# Patient Record
Sex: FEMALE | Race: BLACK OR AFRICAN AMERICAN | NOT HISPANIC OR LATINO | Employment: FULL TIME | URBAN - METROPOLITAN AREA
[De-identification: names, ages, dates, MRNs, and addresses within clinical notes are randomized per-mention and may not be internally consistent; named-entity substitution may affect disease eponyms.]

---

## 2017-03-14 ENCOUNTER — HOSPITAL ENCOUNTER (EMERGENCY)
Facility: OTHER | Age: 32
Discharge: HOME | End: 2017-03-14
Attending: EMERGENCY MEDICINE
Payer: MEDICAID

## 2017-03-14 VITALS
RESPIRATION RATE: 16 BRPM | HEIGHT: 67 IN | OXYGEN SATURATION: 99 % | HEART RATE: 59 BPM | BODY MASS INDEX: 27.34 KG/M2 | SYSTOLIC BLOOD PRESSURE: 105 MMHG | WEIGHT: 174.19 LBS | TEMPERATURE: 98 F | DIASTOLIC BLOOD PRESSURE: 58 MMHG

## 2017-03-14 DIAGNOSIS — R11.2 NAUSEA AND VOMITING, INTRACTABILITY OF VOMITING NOT SPECIFIED, UNSPECIFIED VOMITING TYPE: ICD-10-CM

## 2017-03-14 DIAGNOSIS — J06.9 VIRAL URI WITH COUGH: Primary | ICD-10-CM

## 2017-03-14 LAB
B-HCG UR QL: NEGATIVE
CTP QC/QA: YES
FLUAV AG SPEC QL IA: NEGATIVE
FLUBV AG SPEC QL IA: NEGATIVE
SPECIMEN SOURCE: NORMAL

## 2017-03-14 PROCEDURE — 63600175 PHARM REV CODE 636 W HCPCS: Performed by: PHYSICIAN ASSISTANT

## 2017-03-14 PROCEDURE — 96372 THER/PROPH/DIAG INJ SC/IM: CPT

## 2017-03-14 PROCEDURE — 81025 URINE PREGNANCY TEST: CPT | Performed by: EMERGENCY MEDICINE

## 2017-03-14 PROCEDURE — 99284 EMERGENCY DEPT VISIT MOD MDM: CPT | Mod: 25

## 2017-03-14 PROCEDURE — 87400 INFLUENZA A/B EACH AG IA: CPT | Mod: 59

## 2017-03-14 PROCEDURE — 25000003 PHARM REV CODE 250: Performed by: PHYSICIAN ASSISTANT

## 2017-03-14 RX ORDER — ONDANSETRON 4 MG/1
4 TABLET, ORALLY DISINTEGRATING ORAL EVERY 8 HOURS PRN
Qty: 12 TABLET | Refills: 0 | OUTPATIENT
Start: 2017-03-14 | End: 2020-05-18

## 2017-03-14 RX ORDER — KETOROLAC TROMETHAMINE 30 MG/ML
15 INJECTION, SOLUTION INTRAMUSCULAR; INTRAVENOUS
Status: COMPLETED | OUTPATIENT
Start: 2017-03-14 | End: 2017-03-14

## 2017-03-14 RX ORDER — ONDANSETRON 4 MG/1
4 TABLET, ORALLY DISINTEGRATING ORAL
Status: COMPLETED | OUTPATIENT
Start: 2017-03-14 | End: 2017-03-14

## 2017-03-14 RX ORDER — IBUPROFEN 600 MG/1
600 TABLET ORAL EVERY 6 HOURS PRN
Qty: 20 TABLET | Refills: 0 | OUTPATIENT
Start: 2017-03-14 | End: 2020-05-18

## 2017-03-14 RX ORDER — FLUTICASONE PROPIONATE 50 MCG
1 SPRAY, SUSPENSION (ML) NASAL 2 TIMES DAILY PRN
Qty: 15 G | Refills: 0 | OUTPATIENT
Start: 2017-03-14 | End: 2020-05-18

## 2017-03-14 RX ADMIN — KETOROLAC TROMETHAMINE 15 MG: 30 INJECTION, SOLUTION INTRAMUSCULAR at 10:03

## 2017-03-14 RX ADMIN — ONDANSETRON 4 MG: 4 TABLET, ORALLY DISINTEGRATING ORAL at 10:03

## 2017-03-14 NOTE — ED AVS SNAPSHOT
[facility]  [address]  [place] [address]               Ofelia Ogden   3/14/2017 10:04 AM   ED    Description:  Female : 1985   Department:  [facility]           Your Care was Coordinated By:     Provider Role From To    Tanmay Olivares MD Attending Provider 17 1005 --    Kerri Davalos PA-C Physician Assistant 17 100 --      Reason for Visit     Generalized Body Aches           Diagnoses this Visit        Comments    Viral URI with cough    -  Primary     Nausea and vomiting, intractability of vomiting not specified, unspecified vomiting type           ED Disposition     None           To Do List           Follow-up Information     Follow up with Your primary care doctor  In 2 days.    Why:  For symptom re-check.        These Medications        Disp Refills Start End    fluticasone (FLONASE) 50 mcg/actuation nasal spray 15 g 0 3/14/2017     1 spray by Each Nare route 2 (two) times daily as needed. - Each Nare    ibuprofen (ADVIL,MOTRIN) 600 MG tablet 20 tablet 0 3/14/2017     Take 1 tablet (600 mg total) by mouth every 6 (six) hours as needed for Pain. - Oral    ondansetron (ZOFRAN-ODT) 4 MG TbDL 12 tablet 0 3/14/2017     Take 1 tablet (4 mg total) by mouth every 8 (eight) hours as needed. - Oral      [facility]     [facility] -  Assistance  Registered nurses in the [facility] provide clinical advisement, health education, appointment booking, and other advisory services.  Call for this free service at [phone].             Medications           Message regarding Medications     Verify the changes and/or additions to your medication regime listed below are the same as discussed with your clinician today.  If any of these changes or additions are incorrect, please notify your healthcare provider.        START taking these NEW medications        Refills     "fluticasone (FLONASE) 50 mcg/actuation nasal spray 0    Si spray by Each Nare route 2 (two) times daily as needed.    Class: Print    Route: Each Nare    ibuprofen (ADVIL,MOTRIN) 600 MG tablet 0    Sig: Take 1 tablet (600 mg total) by mouth every 6 (six) hours as needed for Pain.    Class: Print    Route: Oral    ondansetron (ZOFRAN-ODT) 4 MG TbDL 0    Sig: Take 1 tablet (4 mg total) by mouth every 8 (eight) hours as needed.    Class: Print    Route: Oral      These medications were administered today        Dose Freq    ondansetron disintegrating tablet 4 mg 4 mg ED 1 Time    Sig: Take 1 tablet (4 mg total) by mouth ED 1 Time.    Class: Normal    Route: Oral    ketorolac injection 15 mg 15 mg ED 1 Time    Sig: Inject 15 mg into the muscle ED 1 Time.    Class: Normal    Route: Intramuscular           Verify that the below list of medications is an accurate representation of the medications you are currently taking.  If none reported, the list may be blank. If incorrect, please contact your healthcare provider. Carry this list with you in case of emergency.           Current Medications     fluticasone (FLONASE) 50 mcg/actuation nasal spray 1 spray by Each Nare route 2 (two) times daily as needed.    ibuprofen (ADVIL,MOTRIN) 600 MG tablet Take 1 tablet (600 mg total) by mouth every 6 (six) hours as needed for Pain.    ondansetron (ZOFRAN-ODT) 4 MG TbDL Take 1 tablet (4 mg total) by mouth every 8 (eight) hours as needed.           Clinical Reference Information           Your Vitals Were     BP Pulse Temp Resp Height Weight    95/53 (BP Location: Left arm, Patient Position: Sitting) 63 97.8 °F (36.6 °C) (Oral) 18 5' 7" (1.702 m) 79 kg (174 lb 2.6 oz)    Last Period SpO2 BMI          2017 (Exact Date) 97% 27.28 kg/m2        Allergies as of 3/14/2017     No Known Allergies      Immunizations Administered on Date of Encounter - 3/14/2017     None      ED Micro, Lab, POCT     Start Ordered       Status Ordering " "Provider    03/14/17 1011 03/14/17 1011  Influenza antigen Nasal Swab  Once      Final result     03/14/17 0950 03/14/17 0949  POCT urine pregnancy  Once      Final result       ED Imaging Orders     None        Discharge Instructions         How to Control Nausea and Vomiting     Taken before meals, medicines can help ease nausea.    Nausea is feeling that you need to throw up. Throwing up occurs when your body forces food that is in your stomach out through your mouth. Nausea and vomiting are symptoms that are caused by many things. They can happen when a condition or disease, medicine, medical treatment, or a poisonous substance affects the area in your brain that controls vomiting. Some conditions or diseases can cause nausea, abdominal pain or cramps, and vomiting. The symptoms can be mild and go away by themselves. Other symptoms can be serious. You will need to see your healthcare provider for these.  Nausea and vomiting are common. They can be caused by many things. These include:  · "Stomach flu" (gastroenteritis)  · Food poisoning  · Stomach pain (gastritis)  · Blockages  They can also be caused by a head injury, an infection in the brain or inside the ear, or migraines. Other common causes of nausea and vomiting include:  · Brain tumor  · Brain bruise  · Motion sickness  · Drugs. These include alcohol, pain medicines such as morphine, and cancer medicines.  · Toxins. These are poisonous things like plants or liquids that are swallowed by accident.  · Advanced types of cancer  · Movement problems (psychogenic problems)  · Extra pressure in the fluid that surrounds the brain and spinal cord (elevated intracranial pressure)     Nausea and vomiting are also common side effects of chemotherapy and radiation therapy. Side effects happen when treatment changes some normal cells as well as cancer cells. In this case, the cells lining your stomach and the part of your brain that controls vomiting are affected. " Other more serious causes of vomiting may be hard to find early in the illness.     When to seek medical advice  Call your healthcare provider right away if you have the following:  · Nausea or vomiting that lasts 24 hours or more  · Trouble keeping fluids down   Medicines can help  Nausea or vomiting can often be prevented or controlled with medicines (antiemetics). Your doctor may give you antiemetics before or after treatment if you are getting chemotherapy or other medical treatments that cause nausea or vomiting.  Eating tips  · If you have medicines to control nausea, take them before meals as directed.  · Avoid fatty or greasy foods while nauseated.  · Eat small meals slowly throughout the day.  · Ask someone to sit with you while you eat to keep you from thinking about feeling nauseated.  · Eat foods at room temperature or colder to avoid strong smells.  · Eat dry foods, such as toast, crackers, or pretzels. Also eat cool, light foods, such as applesauce, and bland foods, such as oatmeal or skinned chicken.   Other ways to feel better  · Get a little fresh air. Take a short walk.  · Talk to a friend, listen to music, or watch TV.  · Take a few deep, slow breaths.  · Eat by candlelight or in surroundings that you find relaxing.  · Use a technique, such as guided imagery, to help you relax. Imagine yourself in a beautiful, restful scene. Or daydream about the place youd most like to be.  Date Last Reviewed: 1/6/2016  © 0665-5990 [employer]. [address] [place] [address], [place] [address]. All rights reserved. This information is not intended as a substitute for professional medical care. Always follow your healthcare professional's instructions.          Discharge References/Attachments     VIRAL SYNDROME (ADULT) (ENGLISH)      [facility] Sign-Up     Activating your [facility] account is as easy as 1-2-3!     1) Visit [email or web address], select Sign Up Now, enter this activation code and your date of  birth, then select Next.  [email or web address]  Expires: 4/28/2017 10:02 AM      2) Create a username and password to use when you visit [facility] in the future and select a security question in case you lose your password and select Next.    3) Enter your e-mail address and click Sign Up!    Additional Information  If you have questions, please e-mail [email or web address] or call [phone] to talk to our [facility] staff. Remember, [facility] is NOT to be used for urgent needs. For medical emergencies, dial 911.          [facility] complies with applicable Federal civil rights laws and does not discriminate on the basis of race, color, national origin, age, disability, or sex.        Language Assistance Services     ATTENTION: Language assistance services are available, free of charge. Please call [phone].      ATENCIÓN: Si habla español, tiene a barbosa disposición servicios gratuitos de asistencia lingüística. Llame al [phone].     CHÚ Ý: N?u b?n nói Ti?ng Vi?t, có các d?ch v? h? tr? ngôn ng? mi?n phí ysabelh cho b?n. G?i s? [phone].

## 2017-03-14 NOTE — ED PROVIDER NOTES
Encounter Date: 3/14/2017       History     Chief Complaint   Patient presents with    Generalized Body Aches     with vomiting and chills      Review of patient's allergies indicates:  No Known Allergies  HPI Comments: Patient is 31-year-old female who presents with complaints of generalized body aches, subjective fever, nausea with vomiting.  She reports symptoms have been present for 2 days prior to arrival.  She has not been taking any medications help with her symptoms.  She reports no blood in her vomit and no associated diarrhea or bowel changes.  Denies dysuria, vaginal discharge or vaginal bleeding.  No reported sick contacts or recent travel.  Denies abdominal pain.  Currently unaccompanied in the ER.    The history is provided by the patient.     History reviewed. No pertinent past medical history.  History reviewed. No pertinent surgical history.  History reviewed. No pertinent family history.  Social History   Substance Use Topics    Smoking status: Never Smoker    Smokeless tobacco: None    Alcohol use Yes      Comment: occassional     Review of Systems   Constitutional: Negative for chills and fever.   HENT: Negative for sore throat and trouble swallowing.    Eyes: Negative for visual disturbance.   Respiratory: Negative for cough and shortness of breath.    Cardiovascular: Negative for chest pain.   Gastrointestinal: Negative for abdominal pain, constipation, diarrhea, nausea and vomiting.   Genitourinary: Negative for dysuria and flank pain.   Musculoskeletal: Negative for back pain, neck pain and neck stiffness.   Skin: Negative for rash.   Neurological: Negative for dizziness, syncope, weakness and headaches.   Psychiatric/Behavioral: Negative for confusion.       Physical Exam   Initial Vitals   BP Pulse Resp Temp SpO2   03/14/17 0946 03/14/17 0946 03/14/17 0946 03/14/17 0946 03/14/17 0946   95/53 63 18 97.8 °F (36.6 °C) 97 %     Physical Exam    Nursing note and vitals  reviewed.  Constitutional: She appears well-developed and well-nourished. She is not diaphoretic. No distress.   31-year-old female in no acute distress wrapped in a blanket.  She makes good eye contact, speaks in clear full sentences and ambulates with ease.   HENT:   Head: Normocephalic and atraumatic.   Mild posterior oropharynx erythema with no cobblestoning.  There is no uvula deviation, trismus or lingual elevation.    TMs clear bilaterally   Eyes: Conjunctivae and EOM are normal. Pupils are equal, round, and reactive to light. Right eye exhibits no discharge. Left eye exhibits no discharge. No scleral icterus.   Neck: Normal range of motion. Neck supple.   Cardiovascular: Normal rate, regular rhythm and normal heart sounds. Exam reveals no gallop and no friction rub.    No murmur heard.  Pulmonary/Chest: Breath sounds normal. She has no wheezes. She has no rhonchi. She has no rales.   Abdominal: Soft. Bowel sounds are normal. There is no tenderness. There is no rebound and no guarding.   No abdominal tenderness  Normal bowel sounds in all 4 quadrants  No CVA tenderness to percussion   Lymphadenopathy:     She has no cervical adenopathy.         ED Course   Procedures  Labs Reviewed   INFLUENZA A AND B ANTIGEN   POCT URINE PREGNANCY             Medical Decision Making:   ED Management:  Urgent evaluation of 31 year old female who presents with complaints most consistent with viral syndrome with both URI symptoms and vomiting. She is afebrile, non-toxic appearing and hemodynamically stable. Physical exam outlined above and reveals no evidence of acute abdomen, abnormal cardiopulmomary auscultation, or signs of acute bacterial HEENT infection. She has negative rapid flu, passes PO challenge in the ED after zofran and rehydrates orally (BP improves with hydration) and has significant improvement with IM Toradol. I suspect her symptoms have a viral etiology and will self limit and I recommend supportive care. She  is reassured that she is safe for discharge with instruction to follow-up with PCP in 1-2 days for symptom re-check. She is amenable to plan. Case discussed with attending MD who agrees with plan.                    ED Course     Clinical Impression:   The primary encounter diagnosis was Viral URI with cough. A diagnosis of Nausea and vomiting, intractability of vomiting not specified, unspecified vomiting type was also pertinent to this visit.          Kerri Davalos PA-C  03/14/17 7130

## 2017-03-14 NOTE — ED TRIAGE NOTES
Pt c/o N/V which started 2 days ago (Sun). Pt c/o N/V X 3 this morning. Pt also c/o cough, denies fever.

## 2017-03-14 NOTE — DISCHARGE INSTRUCTIONS
"  How to Control Nausea and Vomiting     Taken before meals, medicines can help ease nausea.    Nausea is feeling that you need to throw up. Throwing up occurs when your body forces food that is in your stomach out through your mouth. Nausea and vomiting are symptoms that are caused by many things. They can happen when a condition or disease, medicine, medical treatment, or a poisonous substance affects the area in your brain that controls vomiting. Some conditions or diseases can cause nausea, abdominal pain or cramps, and vomiting. The symptoms can be mild and go away by themselves. Other symptoms can be serious. You will need to see your healthcare provider for these.  Nausea and vomiting are common. They can be caused by many things. These include:  · "Stomach flu" (gastroenteritis)  · Food poisoning  · Stomach pain (gastritis)  · Blockages  They can also be caused by a head injury, an infection in the brain or inside the ear, or migraines. Other common causes of nausea and vomiting include:  · Brain tumor  · Brain bruise  · Motion sickness  · Drugs. These include alcohol, pain medicines such as morphine, and cancer medicines.  · Toxins. These are poisonous things like plants or liquids that are swallowed by accident.  · Advanced types of cancer  · Movement problems (psychogenic problems)  · Extra pressure in the fluid that surrounds the brain and spinal cord (elevated intracranial pressure)     Nausea and vomiting are also common side effects of chemotherapy and radiation therapy. Side effects happen when treatment changes some normal cells as well as cancer cells. In this case, the cells lining your stomach and the part of your brain that controls vomiting are affected. Other more serious causes of vomiting may be hard to find early in the illness.     When to seek medical advice  Call your healthcare provider right away if you have the following:  · Nausea or vomiting that lasts 24 hours or more  · Trouble " keeping fluids down   Medicines can help  Nausea or vomiting can often be prevented or controlled with medicines (antiemetics). Your doctor may give you antiemetics before or after treatment if you are getting chemotherapy or other medical treatments that cause nausea or vomiting.  Eating tips  · If you have medicines to control nausea, take them before meals as directed.  · Avoid fatty or greasy foods while nauseated.  · Eat small meals slowly throughout the day.  · Ask someone to sit with you while you eat to keep you from thinking about feeling nauseated.  · Eat foods at room temperature or colder to avoid strong smells.  · Eat dry foods, such as toast, crackers, or pretzels. Also eat cool, light foods, such as applesauce, and bland foods, such as oatmeal or skinned chicken.   Other ways to feel better  · Get a little fresh air. Take a short walk.  · Talk to a friend, listen to music, or watch TV.  · Take a few deep, slow breaths.  · Eat by candlelight or in surroundings that you find relaxing.  · Use a technique, such as guided imagery, to help you relax. Imagine yourself in a beautiful, restful scene. Or daydream about the place youd most like to be.  Date Last Reviewed: 1/6/2016  © 3690-7810 [employer]. [address] [place] [address], [place] [address]. All rights reserved. This information is not intended as a substitute for professional medical care. Always follow your healthcare professional's instructions.

## 2017-03-14 NOTE — ED NOTES
Patient Identifiers for Ofelia Ogden checked and correct  LOC: The patient is awake, alert and aware of environment with an appropriate affect, the patient is oriented x 3 and speaking appropriate.  APPEARANCE: Patient uncomfortable. Pt is in no acute distress. The patient is clean and well groomed. The patient's clothing is properly fastened.  SKIN: The skin is warm and dry. The patient has normal skin turgor and sticky mucus membranes.   Musculoskeletal :  Normal range of motion noted. Moves all extremities well  RESPIRATORY: Airway is open and patent, respirations are spontaneous, patient has a normal effort and rate. Breath sounds are clear & equal, bilaterally.  ABDOMEN: Soft and non tender to palpation, no distention observed. Bowels Sounds are WNL all quads.  PULSES: 2+ radial & pedal pulses, symmetrical in all extremities.        Will continue to monitor

## 2020-05-18 ENCOUNTER — HOSPITAL ENCOUNTER (EMERGENCY)
Facility: HOSPITAL | Age: 35
Discharge: HOME | End: 2020-05-18
Attending: EMERGENCY MEDICINE
Payer: MEDICAID

## 2020-05-18 VITALS
SYSTOLIC BLOOD PRESSURE: 103 MMHG | HEART RATE: 61 BPM | TEMPERATURE: 98 F | RESPIRATION RATE: 16 BRPM | DIASTOLIC BLOOD PRESSURE: 83 MMHG | OXYGEN SATURATION: 97 %

## 2020-05-18 DIAGNOSIS — B34.9 VIRAL SYNDROME: Primary | ICD-10-CM

## 2020-05-18 PROBLEM — E87.6 HYPOKALEMIA: Status: ACTIVE | Noted: 2017-09-26

## 2020-05-18 PROBLEM — D64.9 ANEMIA: Status: ACTIVE | Noted: 2017-09-26

## 2020-05-18 PROBLEM — R53.83 FATIGUE: Status: ACTIVE | Noted: 2017-09-26

## 2020-05-18 PROBLEM — G47.9 SLEEP DIFFICULTIES: Status: ACTIVE | Noted: 2018-07-10

## 2020-05-18 PROBLEM — N92.6 IRREGULAR MENSTRUAL BLEEDING: Status: ACTIVE | Noted: 2017-09-26

## 2020-05-18 PROBLEM — N92.1 MENORRHAGIA WITH IRREGULAR CYCLE: Status: ACTIVE | Noted: 2017-09-27

## 2020-05-18 PROBLEM — E61.1 IRON DEFICIENCY: Status: ACTIVE | Noted: 2017-10-31

## 2020-05-18 PROBLEM — Z30.9 ENCOUNTER FOR CONTRACEPTIVE MANAGEMENT: Status: ACTIVE | Noted: 2017-12-14

## 2020-05-18 LAB — SARS-COV-2 RDRP RESP QL NAA+PROBE: NEGATIVE

## 2020-05-18 PROCEDURE — U0002 COVID-19 LAB TEST NON-CDC: HCPCS

## 2020-05-18 PROCEDURE — 99284 EMERGENCY DEPT VISIT MOD MDM: CPT | Mod: ,,, | Performed by: EMERGENCY MEDICINE

## 2020-05-18 PROCEDURE — 99284 PR EMERGENCY DEPT VISIT,LEVEL IV: ICD-10-PCS | Mod: ,,, | Performed by: EMERGENCY MEDICINE

## 2020-05-18 PROCEDURE — 99284 EMERGENCY DEPT VISIT MOD MDM: CPT

## 2020-05-18 RX ORDER — ONDANSETRON 4 MG/1
4 TABLET, ORALLY DISINTEGRATING ORAL EVERY 6 HOURS PRN
Qty: 20 TABLET | Refills: 0 | Status: SHIPPED | OUTPATIENT
Start: 2020-05-18

## 2020-05-18 RX ORDER — ETODOLAC 400 MG/1
400 TABLET, FILM COATED ORAL 2 TIMES DAILY PRN
Qty: 20 TABLET | Refills: 0 | Status: SHIPPED | OUTPATIENT
Start: 2020-05-18

## 2020-05-18 RX ORDER — BENZONATATE 100 MG/1
100 CAPSULE ORAL 3 TIMES DAILY PRN
Qty: 30 CAPSULE | Refills: 0 | Status: SHIPPED | OUTPATIENT
Start: 2020-05-18 | End: 2020-05-28

## 2020-05-18 NOTE — ED TRIAGE NOTES
Patient comes into ER with complaints of body pain, headache, and vomiting since last 45 mins. Patient states that she also just developed a cough. Patient denies fever and being around any one with Covid.    Patient also states she has another problem. She states that she put two tampons in three days ago and was only able to get one of them out.

## 2020-05-18 NOTE — ED PROVIDER NOTES
Encounter Date: 5/18/2020    SCRIBE #1 NOTE: I, Arnie Santo, am scribing for, and in the presence of,  Ehsan Chaves MD. I have scribed the following portions of the note - Other sections scribed: HPI, ROS, PE.       History     Chief Complaint   Patient presents with    Headache    Nausea     x30 min, vomited once     Ms. Ogden is a 34 y.o. female with a past medical history of irregular menstrual bleeding, iron deficiency, anemia, and heart block who presents the the ED with nausea (that began one day ago) as well as body aches and vomiting (that began earlier this morning). Patient reports the associated symptoms of a dry cough and diffuse muscle aches. She denies fever, chills, diarrhea, and loss of taste and smell. Additionally, she reports a heavy menstrual period, putting in 2 tampons 3 days ago, but was only able to remove one of them. She denies vaginal discharge or abdominal pain.    The history is provided by the patient and medical records.     Review of patient's allergies indicates:  No Known Allergies  No past medical history on file.  No past surgical history on file.  No family history on file.  Social History     Tobacco Use    Smoking status: Never Smoker   Substance Use Topics    Alcohol use: Yes     Comment: occassional    Drug use: No     Review of Systems   Constitutional: Negative for chills and fever.        - loss of taste  - loss of smell   HENT: Negative for rhinorrhea, sore throat and trouble swallowing.    Respiratory: Positive for cough. Negative for chest tightness, shortness of breath and wheezing.    Cardiovascular: Negative for chest pain, palpitations and leg swelling.   Gastrointestinal: Positive for nausea and vomiting. Negative for abdominal pain and diarrhea.   Genitourinary: Negative for dysuria, vaginal bleeding and vaginal discharge.   Musculoskeletal: Positive for myalgias.   Neurological: Negative for speech difficulty and light-headedness.   All other systems  reviewed and are negative.      Physical Exam     Initial Vitals   BP Pulse Resp Temp SpO2   05/18/20 1607 05/18/20 1607 05/18/20 1607 05/18/20 1619 05/18/20 1607   103/83 61 16 98 °F (36.7 °C) 97 %      MAP       --                Physical Exam    Nursing note and vitals reviewed.  Constitutional: She appears well-developed and well-nourished. She is not diaphoretic. No distress.   HENT:   Head: Normocephalic and atraumatic.   Eyes: Conjunctivae and EOM are normal. Pupils are equal, round, and reactive to light.   Neck: Normal range of motion. Neck supple. No JVD present.   Cardiovascular: Normal rate, regular rhythm and intact distal pulses.   Pulmonary/Chest: No respiratory distress.   Speaks in full sentences  No retraction   Abdominal: Soft. Bowel sounds are normal. She exhibits no distension and no mass. There is no tenderness. There is no rebound and no guarding.   Genitourinary:   Genitourinary Comments: Chaperone Ana AU  Scant vaginal bleeding  No foreign bodies  No cervical motion tenderness  No vaginal discharge  No adnexal tenderness to palpation   Musculoskeletal: Normal range of motion. She exhibits no edema or tenderness.   Neurological: She is alert and oriented to person, place, and time. She has normal strength. No cranial nerve deficit.   Skin: Skin is warm and dry. Capillary refill takes less than 2 seconds.   Psychiatric: She has a normal mood and affect. Thought content normal.         ED Course   Procedures  Labs Reviewed   SARS-COV-2 RNA AMPLIFICATION, QUAL    Narrative:     What symptom criteria does the patient meet?->Muscle pain          Imaging Results    None          Medical Decision Making:   History:   Old Medical Records: I decided to obtain old medical records.  Old Records Summarized: records from clinic visits.       <> Summary of Records: Summarized in HPI  Differential Diagnosis:   COVID19, Viral syndrome, influenza, sepsis, pneumonia, central nervous system infection,  thyroid storm, drug reaction, neuroleptic malignant syndrome, serotonin syndrome, malignant hyperthermia, cavernous sinus thrombosis, pneumonia, acute respiratory distress syndrome, respiratory failure, endocarditis, pericarditis, meningitis, encephalitis, malaria, novel viruses, acute retroviral infection, peritonsillar abscess, retropharyngeal abscess, epiglottitis, dental infections        Clinical Tests:   Lab Tests: Ordered and Reviewed  ED Management:  Discussed with patient that although I was not able to visualize nor palpate any foreign body in her vaginal vault, should she begin to notice any foul odor, any discharge, any rash or have pain she needs to return to the ER for re-evaluation.  Discussed with patient that although her COVID-19 test was negative today, given her symptoms and it is prevalent in the community, there is a strong possibility that she does have COVID-19.  We discussed quarantine and social distancing.  Patient understands that she is not to return to work until she has had at least 3 days without any fevers. After taking into careful account the patient's historical factors, physical exam findings, empirical and objective data obtained from ED workup, the patient appears to be low risk for an emergent medical condition. I feel it is safe and appropriate at this time for the patient to be discharged for follow up and re-evaluation as detailed in the discharge instructions. The patient improved with treatment in the ED and the patient/guardian is comfortable going home. I have discussed the specifics of the workup with the patient/guardian and the patient/guardian has verbalized understanding of the details of the workup, the diagnosis, the treatment plan, and the need for outpatient follow-up.  Although the patient has no emergent etiology today this does not preclude the development of an emergent condition after discharge.  I educated the patient/guardian on the warning signs and  symptoms for which they must seek immediate medical attention. I have advised the patient/guardian that they can return to the ED and/or activate EMS at any time with worsening of their symptoms, change of their symptoms, or with any other medical complaints.  Patient's/guardian understands the ED visit today was primarily to address immediate concerns and to rule out emergent causes of the symptoms. They also understand that these symptoms may require further workup and evaluation as an outpatient. I emphasized the importance of followup.  All questions addressed and patient/guardian were given discharge instructions and followup information.               Scribe Attestation:   Scribe #1: I performed the above scribed service and the documentation accurately describes the services I performed. I attest to the accuracy of the note.                          Clinical Impression:       ICD-10-CM ICD-9-CM   1. Viral syndrome B34.9 079.99         Disposition:   Disposition: Discharged  Condition: Stable     ED Disposition Condition    Discharge Stable        ED Prescriptions     Medication Sig Dispense Start Date End Date Auth. Provider    benzonatate (TESSALON) 100 MG capsule Take 1 capsule (100 mg total) by mouth 3 (three) times daily as needed for Cough. 30 capsule 5/18/2020 5/28/2020 Ehsan Chaves MD    ondansetron (ZOFRAN-ODT) 4 MG TbDL Take 1 tablet (4 mg total) by mouth every 6 (six) hours as needed. 20 tablet 5/18/2020  Ehsan Chaves MD    etodolac (LODINE) 400 MG tablet Take 1 tablet (400 mg total) by mouth 2 (two) times daily as needed (Pain). Take with food 20 tablet 5/18/2020  Ehsan Chaves MD        Follow-up Information     Follow up With Specialties Details Why Contact Info    [facility]  Schedule an appointment as soon as possible for a visit  For follow-up and re-evaluation [address] [facility] [id number] [phone]      [facility]  As needed, for any new or worsening symptoms [address] [facility] [id number] [phone]                                     Ehsan Chaves MD  05/18/20 [id number]

## 2020-05-27 ENCOUNTER — HOSPITAL ENCOUNTER (EMERGENCY)
Facility: HOSPITAL | Age: 35
Discharge: HOME | End: 2020-05-27
Attending: EMERGENCY MEDICINE
Payer: MEDICAID

## 2020-05-27 VITALS
TEMPERATURE: 99 F | HEIGHT: 67 IN | BODY MASS INDEX: 28.09 KG/M2 | DIASTOLIC BLOOD PRESSURE: 72 MMHG | RESPIRATION RATE: 18 BRPM | WEIGHT: 179 LBS | SYSTOLIC BLOOD PRESSURE: 112 MMHG | OXYGEN SATURATION: 100 % | HEART RATE: 56 BPM

## 2020-05-27 DIAGNOSIS — S41.112A LACERATION OF LEFT UPPER EXTREMITY, INITIAL ENCOUNTER: Primary | ICD-10-CM

## 2020-05-27 PROCEDURE — 25000003 PHARM REV CODE 250: Performed by: EMERGENCY MEDICINE

## 2020-05-27 PROCEDURE — 99282 EMERGENCY DEPT VISIT SF MDM: CPT | Mod: 25

## 2020-05-27 PROCEDURE — 12001 RPR S/N/AX/GEN/TRNK 2.5CM/<: CPT

## 2020-05-27 RX ORDER — LIDOCAINE HYDROCHLORIDE AND EPINEPHRINE 10; 10 MG/ML; UG/ML
20 INJECTION, SOLUTION INFILTRATION; PERINEURAL ONCE
Status: COMPLETED | OUTPATIENT
Start: 2020-05-27 | End: 2020-05-27

## 2020-05-27 RX ADMIN — LIDOCAINE HYDROCHLORIDE AND EPINEPHRINE 20 ML: 10; 10 INJECTION, SOLUTION INFILTRATION; PERINEURAL at 01:05

## 2020-05-27 NOTE — ED PROVIDER NOTES
Encounter Date: 5/27/2020       History     Chief Complaint   Patient presents with    Arm Injury     left arm hit car door and caused laceration. last tetanus 1 year ago per pt.        Time seen by provider: 1:03 AM on 05/27/2020    The patient is a 34 y.o. female who presents to the ED with complaint of a left lower arm injury which onset suddenly tonight. Symptoms are moderate in severity. Associated sxs include laceration to her left inner forearm. Patient denies all other sxs at this time. No prior Tx included. Patient reports she got her skin stuck in the car door causing the laceration, prompting her to come in.    No PMHx. No PSHx.    The history is provided by the patient.     Review of patient's allergies indicates:  No Known Allergies  No past medical history on file.  No past surgical history on file.  No family history on file.  Social History     Tobacco Use    Smoking status: Never Smoker   Substance Use Topics    Alcohol use: Yes     Comment: occassional    Drug use: No     Review of Systems   Constitutional: Negative for chills and fever.   HENT: Negative for ear pain and sore throat.    Eyes: Negative for redness.   Respiratory: Negative for shortness of breath.    Cardiovascular: Negative for chest pain.   Gastrointestinal: Negative for abdominal pain, diarrhea and vomiting.   Genitourinary: Negative for dysuria.   Musculoskeletal: Negative for back pain.   Skin: Positive for wound (Laceration to left forearm.). Negative for rash.   Neurological: Negative for headaches.       Physical Exam     Initial Vitals [05/27/20 0045]   BP Pulse Resp Temp SpO2   138/75 (!) 58 18 98.5 °F (36.9 °C) 100 %      MAP       --         Physical Exam    Nursing note and vitals reviewed.  Constitutional: She appears well-developed and well-nourished. She is not diaphoretic. No distress.   HENT:   Head: Normocephalic and atraumatic.   Eyes: Conjunctivae and EOM are normal.   Neck: Normal range of motion. Neck  supple.   Cardiovascular: Normal rate, regular rhythm and normal heart sounds.   Pulmonary/Chest: Breath sounds normal. No respiratory distress.   Abdominal: Soft. There is no tenderness.   Musculoskeletal: Normal range of motion. She exhibits no edema or tenderness.   Neurological: She is alert and oriented to person, place, and time. She has normal strength.   Skin: Skin is warm and dry. Capillary refill takes less than 2 seconds. Laceration noted.   2 cm laceration with adipose tissue exposed to left upper extremity. Bleeding controlled.   Psychiatric: She has a normal mood and affect. Her behavior is normal. Thought content normal.         ED Course   Lac Repair  Date/Time: 5/27/2020 1:58 AM  Performed by: Ghulam Singh MD  Authorized by: Ghulam Singh MD   Body area: upper extremity  Location details: left lower arm  Laceration length: 2 cm  Foreign bodies: no foreign bodies  Tendon involvement: none  Nerve involvement: none  Vascular damage: no  Anesthesia: see MAR for details    Anesthesia:  Local Anesthetic: lidocaine 1% with epinephrine  Patient sedated: no  Preparation: Patient was prepped and draped in the usual sterile fashion.  Irrigation solution: saline  Irrigation method: syringe  Amount of cleaning: standard  Debridement: none  Degree of undermining: none  Skin closure: 3-0 Prolene  Number of sutures: 3  Technique: simple  Approximation: close  Approximation difficulty: simple  Dressing: open (no dressing)  Patient tolerance: Patient tolerated the procedure well with no immediate complications        Labs Reviewed - No data to display       Imaging Results    None          Medical Decision Making:   Initial Assessment:   34-year-old female, no medical problems, presents the ER for evaluation of laceration to left.  She struck it at a car door and hence suffered a laceration period 2-3 cm laceration with exposed adipose tissue requiring sutures.  No foreign body noted.  No other trauma or  complaints.  Will plan to washout, suture wound, will reassess.            Scribe Attestation:   Scribe #1: I performed the above scribed service and the documentation accurately describes the services I performed. I attest to the accuracy of the note.    Attending Attestation:           Physician Attestation for Scribe:  Physician Attestation Statement for Scribe #1: I, Dr. Singh, reviewed documentation, as scribed by Daniella Vo in my presence, and it is both accurate and complete.                 ED Course as of May 27 0214   Wed May 27, 2020   0158 Resting comfortably in bed, status post 3 sutures.  Wound extensively cleaned and washed out.  Patient tolerated procedure well.  Discussed with patient wound care precautions, return to the ER 7-10 days for suture removal.  Patient understood agreed plan, patient will be discharged.    [SE]      ED Course User Index  [SE] Ghulam Singh MD                Clinical Impression:       ICD-10-CM ICD-9-CM   1. Laceration of left upper extremity, initial encounter S41.112A 884.0         Disposition:   Disposition: Discharged  Condition: Stable     ED Disposition Condition    Discharge Stable        ED Prescriptions     None        Follow-up Information     Follow up With Specialties Details Why Contact Info    [facility] In 1 week  [address] [id number] [phone]                                     Ghulam Singh MD  05/27/20 0214
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
## 2020-05-27 NOTE — ED NOTES
Advised pt to return in a week for suture removal. Also reviewed signs and symptoms of infection and advised pt to return to ED if any occur. Pt verbalized understanding.

## 2020-06-19 ENCOUNTER — HOSPITAL ENCOUNTER (EMERGENCY)
Facility: HOSPITAL | Age: 35
Discharge: HOME | End: 2020-06-19
Attending: EMERGENCY MEDICINE
Payer: MEDICAID

## 2020-06-19 VITALS
WEIGHT: 186 LBS | DIASTOLIC BLOOD PRESSURE: 86 MMHG | BODY MASS INDEX: 29.13 KG/M2 | TEMPERATURE: 99 F | SYSTOLIC BLOOD PRESSURE: 133 MMHG | OXYGEN SATURATION: 100 % | RESPIRATION RATE: 20 BRPM | HEART RATE: 89 BPM

## 2020-06-19 DIAGNOSIS — Z48.02 VISIT FOR SUTURE REMOVAL: Primary | ICD-10-CM

## 2020-06-19 PROCEDURE — 99281 EMR DPT VST MAYX REQ PHY/QHP: CPT

## 2020-06-19 NOTE — Clinical Note
Ofelia Ogden was seen and treated in our emergency department on 6/19/2020.  She may return to work on 06/20/2020.       If you have any questions or concerns, please don't hesitate to call.      Carlos Campo MD
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
## 2020-06-19 NOTE — Clinical Note
Ofelia Ogden was seen and treated in our emergency department on 6/19/2020.  She may return to work on 06/20/2020.       If you have any questions or concerns, please don't hesitate to call.      Kerri Hallman PA-C
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
## 2020-06-20 NOTE — ED NOTES
CLEVELAND Manning at bedside to remove sutures. Sutures removed. Gauze and coban placed over incision on left forearm. Pt tolerated will.

## 2021-10-09 ENCOUNTER — HOSPITAL ENCOUNTER (EMERGENCY)
Facility: HOSPITAL | Age: 36
Discharge: HOME | End: 2021-10-09
Attending: EMERGENCY MEDICINE
Payer: MEDICAID

## 2021-10-09 VITALS
WEIGHT: 186 LBS | TEMPERATURE: 98 F | OXYGEN SATURATION: 99 % | BODY MASS INDEX: 29.13 KG/M2 | RESPIRATION RATE: 18 BRPM | SYSTOLIC BLOOD PRESSURE: 129 MMHG | HEART RATE: 78 BPM | DIASTOLIC BLOOD PRESSURE: 81 MMHG

## 2021-10-09 DIAGNOSIS — B34.9 VIRAL SYNDROME: ICD-10-CM

## 2021-10-09 DIAGNOSIS — H66.91 RIGHT OTITIS MEDIA, UNSPECIFIED OTITIS MEDIA TYPE: Primary | ICD-10-CM

## 2021-10-09 LAB
CTP QC/QA: YES
SARS-COV-2 RDRP RESP QL NAA+PROBE: NEGATIVE

## 2021-10-09 PROCEDURE — U0002 COVID-19 LAB TEST NON-CDC: HCPCS | Performed by: PHYSICIAN ASSISTANT

## 2021-10-09 PROCEDURE — 25000003 PHARM REV CODE 250: Performed by: PHYSICIAN ASSISTANT

## 2021-10-09 PROCEDURE — 99284 EMERGENCY DEPT VISIT MOD MDM: CPT

## 2021-10-09 RX ORDER — AMOXICILLIN 250 MG/1
500 CAPSULE ORAL
Status: COMPLETED | OUTPATIENT
Start: 2021-10-09 | End: 2021-10-09

## 2021-10-09 RX ORDER — ACETAMINOPHEN 500 MG
500 TABLET ORAL EVERY 6 HOURS PRN
Qty: 30 TABLET | Refills: 0 | Status: ON HOLD | OUTPATIENT
Start: 2021-10-09 | End: 2022-06-20 | Stop reason: SDUPTHER

## 2021-10-09 RX ORDER — AMOXICILLIN 500 MG/1
500 CAPSULE ORAL 3 TIMES DAILY
Qty: 21 CAPSULE | Refills: 0 | Status: SHIPPED | OUTPATIENT
Start: 2021-10-09 | End: 2021-10-16

## 2021-10-09 RX ORDER — ONDANSETRON 4 MG/1
4 TABLET, ORALLY DISINTEGRATING ORAL
Status: COMPLETED | OUTPATIENT
Start: 2021-10-09 | End: 2021-10-09

## 2021-10-09 RX ORDER — ACETAMINOPHEN 500 MG
1000 TABLET ORAL
Status: COMPLETED | OUTPATIENT
Start: 2021-10-09 | End: 2021-10-09

## 2021-10-09 RX ORDER — PROMETHAZINE HYDROCHLORIDE AND DEXTROMETHORPHAN HYDROBROMIDE 6.25; 15 MG/5ML; MG/5ML
5 SYRUP ORAL NIGHTLY PRN
Qty: 118 ML | Refills: 0 | Status: SHIPPED | OUTPATIENT
Start: 2021-10-09 | End: 2021-10-19

## 2021-10-09 RX ORDER — IBUPROFEN 600 MG/1
600 TABLET ORAL EVERY 6 HOURS PRN
Qty: 30 TABLET | Refills: 0 | OUTPATIENT
Start: 2021-10-09 | End: 2022-01-07

## 2021-10-09 RX ADMIN — ACETAMINOPHEN 1000 MG: 500 TABLET ORAL at 01:10

## 2021-10-09 RX ADMIN — AMOXICILLIN 500 MG: 250 CAPSULE ORAL at 01:10

## 2021-10-09 RX ADMIN — ONDANSETRON 4 MG: 4 TABLET, ORALLY DISINTEGRATING ORAL at 01:10

## 2022-01-07 ENCOUNTER — HOSPITAL ENCOUNTER (EMERGENCY)
Facility: OTHER | Age: 37
Discharge: HOME | End: 2022-01-07
Attending: EMERGENCY MEDICINE
Payer: MEDICAID

## 2022-01-07 VITALS
SYSTOLIC BLOOD PRESSURE: 104 MMHG | WEIGHT: 182 LBS | BODY MASS INDEX: 28.56 KG/M2 | DIASTOLIC BLOOD PRESSURE: 53 MMHG | HEART RATE: 64 BPM | HEIGHT: 67 IN | OXYGEN SATURATION: 100 % | RESPIRATION RATE: 16 BRPM | TEMPERATURE: 99 F

## 2022-01-07 DIAGNOSIS — M54.50 ACUTE BILATERAL LOW BACK PAIN WITHOUT SCIATICA: ICD-10-CM

## 2022-01-07 DIAGNOSIS — N39.0 URINARY TRACT INFECTION WITHOUT HEMATURIA, SITE UNSPECIFIED: ICD-10-CM

## 2022-01-07 DIAGNOSIS — R10.30 ABDOMINAL PAIN, LOWER: Primary | ICD-10-CM

## 2022-01-07 LAB
ALBUMIN SERPL BCP-MCNC: 4.4 G/DL (ref 3.5–5.2)
ALP SERPL-CCNC: 43 U/L (ref 55–135)
ALT SERPL W/O P-5'-P-CCNC: 25 U/L (ref 10–44)
ANION GAP SERPL CALC-SCNC: 12 MMOL/L (ref 8–16)
AST SERPL-CCNC: 32 U/L (ref 10–40)
B-HCG UR QL: NEGATIVE
BACTERIA #/AREA URNS HPF: ABNORMAL /HPF
BASOPHILS # BLD AUTO: 0.03 K/UL (ref 0–0.2)
BASOPHILS NFR BLD: 0.5 % (ref 0–1.9)
BILIRUB SERPL-MCNC: 0.7 MG/DL (ref 0.1–1)
BILIRUB UR QL STRIP: NEGATIVE
BUN SERPL-MCNC: 14 MG/DL (ref 6–20)
CALCIUM SERPL-MCNC: 8.8 MG/DL (ref 8.7–10.5)
CHLORIDE SERPL-SCNC: 108 MMOL/L (ref 95–110)
CLARITY UR: ABNORMAL
CO2 SERPL-SCNC: 18 MMOL/L (ref 23–29)
COLOR UR: YELLOW
CREAT SERPL-MCNC: 0.7 MG/DL (ref 0.5–1.4)
CTP QC/QA: YES
DIFFERENTIAL METHOD: ABNORMAL
EOSINOPHIL # BLD AUTO: 0.1 K/UL (ref 0–0.5)
EOSINOPHIL NFR BLD: 1.8 % (ref 0–8)
ERYTHROCYTE [DISTWIDTH] IN BLOOD BY AUTOMATED COUNT: 14 % (ref 11.5–14.5)
EST. GFR  (AFRICAN AMERICAN): >60 ML/MIN/1.73 M^2
EST. GFR  (NON AFRICAN AMERICAN): >60 ML/MIN/1.73 M^2
GLUCOSE SERPL-MCNC: 85 MG/DL (ref 70–110)
GLUCOSE UR QL STRIP: NEGATIVE
HCT VFR BLD AUTO: 37.1 % (ref 37–48.5)
HGB BLD-MCNC: 12.5 G/DL (ref 12–16)
HGB UR QL STRIP: ABNORMAL
IMM GRANULOCYTES # BLD AUTO: 0.05 K/UL (ref 0–0.04)
IMM GRANULOCYTES NFR BLD AUTO: 0.8 % (ref 0–0.5)
KETONES UR QL STRIP: NEGATIVE
LEUKOCYTE ESTERASE UR QL STRIP: ABNORMAL
LYMPHOCYTES # BLD AUTO: 1.4 K/UL (ref 1–4.8)
LYMPHOCYTES NFR BLD: 22.7 % (ref 18–48)
MCH RBC QN AUTO: 32.6 PG (ref 27–31)
MCHC RBC AUTO-ENTMCNC: 33.7 G/DL (ref 32–36)
MCV RBC AUTO: 97 FL (ref 82–98)
MICROSCOPIC COMMENT: ABNORMAL
MONOCYTES # BLD AUTO: 0.5 K/UL (ref 0.3–1)
MONOCYTES NFR BLD: 7.5 % (ref 4–15)
NEUTROPHILS # BLD AUTO: 4 K/UL (ref 1.8–7.7)
NEUTROPHILS NFR BLD: 66.7 % (ref 38–73)
NITRITE UR QL STRIP: POSITIVE
NRBC BLD-RTO: 0 /100 WBC
PH UR STRIP: 6 [PH] (ref 5–8)
PLATELET # BLD AUTO: 245 K/UL (ref 150–450)
PLATELET BLD QL SMEAR: ABNORMAL
PMV BLD AUTO: 10.9 FL (ref 9.2–12.9)
POTASSIUM SERPL-SCNC: 4.4 MMOL/L (ref 3.5–5.1)
PROT SERPL-MCNC: 7.9 G/DL (ref 6–8.4)
PROT UR QL STRIP: ABNORMAL
RBC # BLD AUTO: 3.83 M/UL (ref 4–5.4)
RBC #/AREA URNS HPF: 20 /HPF (ref 0–4)
SODIUM SERPL-SCNC: 138 MMOL/L (ref 136–145)
SP GR UR STRIP: >=1.03 (ref 1–1.03)
SQUAMOUS #/AREA URNS HPF: 3 /HPF
URN SPEC COLLECT METH UR: ABNORMAL
UROBILINOGEN UR STRIP-ACNC: 1 EU/DL
WBC # BLD AUTO: 5.99 K/UL (ref 3.9–12.7)
WBC #/AREA URNS HPF: 30 /HPF (ref 0–5)

## 2022-01-07 PROCEDURE — 81000 URINALYSIS NONAUTO W/SCOPE: CPT | Performed by: EMERGENCY MEDICINE

## 2022-01-07 PROCEDURE — 87186 SC STD MICRODIL/AGAR DIL: CPT | Performed by: EMERGENCY MEDICINE

## 2022-01-07 PROCEDURE — 80053 COMPREHEN METABOLIC PANEL: CPT | Performed by: EMERGENCY MEDICINE

## 2022-01-07 PROCEDURE — 81025 URINE PREGNANCY TEST: CPT | Performed by: EMERGENCY MEDICINE

## 2022-01-07 PROCEDURE — 87086 URINE CULTURE/COLONY COUNT: CPT | Performed by: EMERGENCY MEDICINE

## 2022-01-07 PROCEDURE — 87077 CULTURE AEROBIC IDENTIFY: CPT | Performed by: EMERGENCY MEDICINE

## 2022-01-07 PROCEDURE — 99284 EMERGENCY DEPT VISIT MOD MDM: CPT | Mod: 25

## 2022-01-07 PROCEDURE — 87088 URINE BACTERIA CULTURE: CPT | Performed by: EMERGENCY MEDICINE

## 2022-01-07 PROCEDURE — 96372 THER/PROPH/DIAG INJ SC/IM: CPT

## 2022-01-07 PROCEDURE — 25000003 PHARM REV CODE 250: Performed by: EMERGENCY MEDICINE

## 2022-01-07 PROCEDURE — 63600175 PHARM REV CODE 636 W HCPCS: Performed by: EMERGENCY MEDICINE

## 2022-01-07 PROCEDURE — 85025 COMPLETE CBC W/AUTO DIFF WBC: CPT | Performed by: EMERGENCY MEDICINE

## 2022-01-07 RX ORDER — METHOCARBAMOL 500 MG/1
1000 TABLET, FILM COATED ORAL 3 TIMES DAILY PRN
Qty: 20 TABLET | Refills: 0 | Status: SHIPPED | OUTPATIENT
Start: 2022-01-07 | End: 2022-01-12

## 2022-01-07 RX ORDER — NITROFURANTOIN 25; 75 MG/1; MG/1
100 CAPSULE ORAL
Status: COMPLETED | OUTPATIENT
Start: 2022-01-07 | End: 2022-01-07

## 2022-01-07 RX ORDER — ONDANSETRON 4 MG/1
4 TABLET, FILM COATED ORAL EVERY 6 HOURS PRN
Qty: 12 TABLET | Refills: 0 | Status: SHIPPED | OUTPATIENT
Start: 2022-01-07

## 2022-01-07 RX ORDER — KETOROLAC TROMETHAMINE 30 MG/ML
10 INJECTION, SOLUTION INTRAMUSCULAR; INTRAVENOUS
Status: DISCONTINUED | OUTPATIENT
Start: 2022-01-07 | End: 2022-01-07

## 2022-01-07 RX ORDER — IBUPROFEN 800 MG/1
800 TABLET ORAL EVERY 6 HOURS PRN
Qty: 30 TABLET | Refills: 0 | Status: ON HOLD | OUTPATIENT
Start: 2022-01-07 | End: 2022-06-20 | Stop reason: SDUPTHER

## 2022-01-07 RX ORDER — KETOROLAC TROMETHAMINE 30 MG/ML
30 INJECTION, SOLUTION INTRAMUSCULAR; INTRAVENOUS
Status: COMPLETED | OUTPATIENT
Start: 2022-01-07 | End: 2022-01-07

## 2022-01-07 RX ORDER — NITROFURANTOIN 25; 75 MG/1; MG/1
100 CAPSULE ORAL 2 TIMES DAILY
Qty: 14 CAPSULE | Refills: 0 | Status: SHIPPED | OUTPATIENT
Start: 2022-01-07 | End: 2022-01-14

## 2022-01-07 RX ORDER — ONDANSETRON 2 MG/ML
4 INJECTION INTRAMUSCULAR; INTRAVENOUS
Status: DISCONTINUED | OUTPATIENT
Start: 2022-01-07 | End: 2022-01-07

## 2022-01-07 RX ORDER — ONDANSETRON 4 MG/1
4 TABLET, ORALLY DISINTEGRATING ORAL
Status: COMPLETED | OUTPATIENT
Start: 2022-01-07 | End: 2022-01-07

## 2022-01-07 RX ADMIN — ONDANSETRON 4 MG: 4 TABLET, ORALLY DISINTEGRATING ORAL at 05:01

## 2022-01-07 RX ADMIN — KETOROLAC TROMETHAMINE 30 MG: 30 INJECTION, SOLUTION INTRAMUSCULAR; INTRAVENOUS at 05:01

## 2022-01-07 RX ADMIN — NITROFURANTOIN (MONOHYDRATE/MACROCRYSTALS) 100 MG: 75; 25 CAPSULE ORAL at 07:01

## 2022-01-07 NOTE — ED NOTES
Iv attempt x 2 right AC and left AC unsucessful  Pt did not pebbles iv attempt well.  Pt  is difficult stick

## 2022-01-07 NOTE — ED NOTES
Pt in er 11 c.o lower back and lower abd pain onset this AM.  Pt denies urinary symptoms.  Pt c.o n/v. Pt tested postive for covid 2 weeks ago.  AAO x 3 nadn skin w.d

## 2022-01-07 NOTE — ED PROVIDER NOTES
Encounter Date: 1/7/2022    SCRIBE #1 NOTE: I, Sayda Johnson, am scribing for, and in the presence of,  Mick Engle MD. I have scribed the following portions of the note - Other sections scribed: HPI, ROS, PE.       History     Chief Complaint   Patient presents with    Flank Pain     Pt with complaints of pain along the lower abdominal pain radiating into the bilateral flank/back area since around 9:30am. Pt with a hx of kidney stones; denies urinary symptoms.     Ofelia Ogden is a 36 y.o. female, with no PMHx, who presents to the ED with abdominal pain. Patient reports an onset of lower 10/10 intermittent (occurring in 2 minute episodes) abdominal pain that wraps around to her lower back sudden onset this AM. States her pain feels similar to her prior episodes of kidney stones. She also reports nausea, vomiting, and diarrhea. She states she was otherwise in her normal state of health prior to the onset of her symptoms. No other exacerbating or alleviating factors. Patient endorses medication compliance with her anxiety medication. No other daily medications. Patient admits to occasional EtOH use, but denies cigarette or recreational drug use. She was recently on a full course of Abx 2 weeks ago. States her current menstrual period is ongoing. Denies difficulty urinating, dysuria, or other associated symptoms.     The history is provided by the patient.     Review of patient's allergies indicates:  No Known Allergies  No past medical history on file.  No past surgical history on file.  No family history on file.  Social History     Tobacco Use    Smoking status: Never Smoker   Substance Use Topics    Alcohol use: Yes     Comment: occassional    Drug use: No     Review of Systems   Constitutional: Negative for chills and fever.   HENT: Negative for congestion, rhinorrhea and sore throat.    Eyes: Negative for visual disturbance.   Respiratory: Negative for cough and shortness of breath.     Cardiovascular: Negative for chest pain.   Gastrointestinal: Positive for abdominal pain, diarrhea, nausea and vomiting.   Genitourinary: Negative for difficulty urinating, dysuria, frequency and hematuria.   Musculoskeletal: Positive for back pain.   Skin: Negative for rash.   Neurological: Negative for dizziness, weakness and headaches.       Physical Exam     Initial Vitals [01/07/22 1447]   BP Pulse Resp Temp SpO2   133/69 81 (!) 22 97.8 °F (36.6 °C) 97 %      MAP       --         Physical Exam    Nursing note and vitals reviewed.  Constitutional: She appears well-developed and well-nourished.   Uncomfortable appearing. In mild distress due to pain.    HENT:   Head: Normocephalic and atraumatic.   Right Ear: External ear normal.   Left Ear: External ear normal.   Dry mucous membranes.    Eyes: Pupils are equal, round, and reactive to light.   No pallor or icterus.    Neck: Neck supple.   Cardiovascular: Normal rate, regular rhythm and intact distal pulses.   No murmur heard.  Pulmonary/Chest: Breath sounds normal. No respiratory distress.   Abdominal: Abdomen is soft.   Diffuse tenderness to the lower abdomen without rebound or guarding. No focal tenderness at McBurney's point.    Musculoskeletal:         General: No edema.      Cervical back: Neck supple.      Comments: Nonspecific tenderness to the bilateral lumbar paraspinous area.      Neurological: She is alert and oriented to person, place, and time.   Skin: Skin is warm and dry. No rash noted.   Psychiatric: She has a normal mood and affect.         ED Course   Procedures  Labs Reviewed   URINALYSIS, REFLEX TO URINE CULTURE - Abnormal; Notable for the following components:       Result Value    Appearance, UA Hazy (*)     Specific Gravity, UA >=1.030 (*)     Protein, UA Trace (*)     Occult Blood UA 2+ (*)     Nitrite, UA Positive (*)     Leukocytes, UA 2+ (*)     All other components within normal limits    Narrative:     Specimen Source->Urine   CBC  W/ AUTO DIFFERENTIAL - Abnormal; Notable for the following components:    RBC 3.83 (*)     MCH 32.6 (*)     Immature Granulocytes 0.8 (*)     Immature Grans (Abs) 0.05 (*)     All other components within normal limits   COMPREHENSIVE METABOLIC PANEL - Abnormal; Notable for the following components:    CO2 18 (*)     Alkaline Phosphatase 43 (*)     All other components within normal limits   URINALYSIS MICROSCOPIC - Abnormal; Notable for the following components:    RBC, UA 20 (*)     WBC, UA 30 (*)     Bacteria Moderate (*)     All other components within normal limits    Narrative:     Specimen Source->Urine   CULTURE, URINE   POCT URINE PREGNANCY          Imaging Results    None          Medications   ketorolac injection 30 mg (30 mg Intramuscular Given 1/7/22 1747)   ondansetron disintegrating tablet 4 mg (4 mg Oral Given 1/7/22 1737)   nitrofurantoin (macrocrystal-monohydrate) 100 MG capsule 100 mg (100 mg Oral Given 1/7/22 1910)     Medical Decision Making:   History:   Old Medical Records: I decided to obtain old medical records.  Clinical Tests:   Lab Tests: Ordered and Reviewed  Patient presents complaining of onset of lower abdominal pain and lower back pain.  No fevers or chills.  Does report nausea vomiting diarrhea.  She does have a history of kidney stones, but does not have any other GI/ complaints.  Difficult IV access therefore given medications IM.  Pain is feeling better.  Nausea has resolved.  Laboratory studies were unremarkable, normal white count.  No lab evidence of biliary, pancreatic, hepatic disease.  Normal electrolytes and renal function.  Urinalysis does suggest UTI.  In addition to medications for symptomatic relief of low back pain will also start on course of antibiotics.  Encouraged follow-up with primary care, especially if symptoms persist.  Return precautions discussed for the interim.        Scribe Attestation:   Scribe #1: I performed the above scribed service and the  documentation accurately describes the services I performed. I attest to the accuracy of the note.               I, TL, personally performed the services described in this documentation. All medical record entries made by the scribe were at my direction and in my presence. I have reviewed the chart and agree that the record reflects my personal performance and is accurate and complete.    Clinical Impression:   Final diagnoses:  [R10.30] Abdominal pain, lower (Primary)  [M54.50] Acute bilateral low back pain without sciatica  [N39.0] Urinary tract infection without hematuria, site unspecified          ED Disposition Condition    Discharge Stable        ED Prescriptions     Medication Sig Dispense Start Date End Date Auth. Provider    ibuprofen (ADVIL,MOTRIN) 800 MG tablet Take 1 tablet (800 mg total) by mouth every 6 (six) hours as needed for Pain. 30 tablet 1/7/2022  Mick Engle II, MD    ondansetron (ZOFRAN) 4 MG tablet Take 1 tablet (4 mg total) by mouth every 6 (six) hours as needed for Nausea. 12 tablet 1/7/2022  Mick Engle II, MD    methocarbamoL (ROBAXIN) 500 MG Tab Take 2 tablets (1,000 mg total) by mouth 3 (three) times daily as needed. 20 tablet 1/7/2022 1/12/2022 Mick Engle II, MD    nitrofurantoin, macrocrystal-monohydrate, (MACROBID) 100 MG capsule Take 1 capsule (100 mg total) by mouth 2 (two) times daily. for 7 days 14 capsule 1/7/2022 1/14/2022 Mick Engle II, MD        Follow-up Information     Follow up With Specialties Details Why Contact Info    Primary Care Clinic  Schedule an appointment as soon as possible for a visit in 5 days             Mick Engle II, MD  01/08/22 0012

## 2022-01-07 NOTE — ED NOTES
Pt states she is unable to stand and will need to use a bedpan. Pt getting undressed and states will be able to urine right after.

## 2022-01-10 LAB — BACTERIA UR CULT: ABNORMAL

## 2022-06-17 ENCOUNTER — HOSPITAL ENCOUNTER (OUTPATIENT)
Facility: OTHER | Age: 37
Discharge: HOME | End: 2022-06-20
Attending: EMERGENCY MEDICINE | Admitting: OBSTETRICS & GYNECOLOGY
Payer: MEDICAID

## 2022-06-17 DIAGNOSIS — Z98.890 STATUS POST EXPLORATORY LAPAROTOMY: ICD-10-CM

## 2022-06-17 DIAGNOSIS — N83.201 CYST OF RIGHT OVARY: Primary | ICD-10-CM

## 2022-06-17 DIAGNOSIS — R10.30 LOWER ABDOMINAL PAIN: ICD-10-CM

## 2022-06-17 LAB
ALBUMIN SERPL BCP-MCNC: 3.8 G/DL (ref 3.5–5.2)
ALP SERPL-CCNC: 59 U/L (ref 55–135)
ALT SERPL W/O P-5'-P-CCNC: 13 U/L (ref 10–44)
ANION GAP SERPL CALC-SCNC: 13 MMOL/L (ref 8–16)
AST SERPL-CCNC: 10 U/L (ref 10–40)
B-HCG UR QL: NEGATIVE
BACTERIA #/AREA URNS HPF: ABNORMAL /HPF
BASOPHILS # BLD AUTO: 0.03 K/UL (ref 0–0.2)
BASOPHILS NFR BLD: 0.4 % (ref 0–1.9)
BILIRUB SERPL-MCNC: 0.7 MG/DL (ref 0.1–1)
BILIRUB UR QL STRIP: ABNORMAL
BUN SERPL-MCNC: 11 MG/DL (ref 6–20)
CALCIUM SERPL-MCNC: 9.4 MG/DL (ref 8.7–10.5)
CHLORIDE SERPL-SCNC: 106 MMOL/L (ref 95–110)
CLARITY UR: CLEAR
CO2 SERPL-SCNC: 19 MMOL/L (ref 23–29)
COLOR UR: YELLOW
CREAT SERPL-MCNC: 0.8 MG/DL (ref 0.5–1.4)
CTP QC/QA: YES
CTP QC/QA: YES
DIFFERENTIAL METHOD: ABNORMAL
EOSINOPHIL # BLD AUTO: 0.2 K/UL (ref 0–0.5)
EOSINOPHIL NFR BLD: 2.6 % (ref 0–8)
ERYTHROCYTE [DISTWIDTH] IN BLOOD BY AUTOMATED COUNT: 13.2 % (ref 11.5–14.5)
EST. GFR  (AFRICAN AMERICAN): >60 ML/MIN/1.73 M^2
EST. GFR  (NON AFRICAN AMERICAN): >60 ML/MIN/1.73 M^2
GLUCOSE SERPL-MCNC: 81 MG/DL (ref 70–110)
GLUCOSE UR QL STRIP: NEGATIVE
HCT VFR BLD AUTO: 38.3 % (ref 37–48.5)
HCV AB SERPL QL IA: NEGATIVE
HGB BLD-MCNC: 12.7 G/DL (ref 12–16)
HGB UR QL STRIP: ABNORMAL
HIV 1+2 AB+HIV1 P24 AG SERPL QL IA: NEGATIVE
HYALINE CASTS #/AREA URNS LPF: 5 /LPF
IMM GRANULOCYTES # BLD AUTO: 0.04 K/UL (ref 0–0.04)
IMM GRANULOCYTES NFR BLD AUTO: 0.5 % (ref 0–0.5)
KETONES UR QL STRIP: ABNORMAL
LEUKOCYTE ESTERASE UR QL STRIP: NEGATIVE
LIPASE SERPL-CCNC: 40 U/L (ref 4–60)
LYMPHOCYTES # BLD AUTO: 1.6 K/UL (ref 1–4.8)
LYMPHOCYTES NFR BLD: 20.1 % (ref 18–48)
MCH RBC QN AUTO: 31.9 PG (ref 27–31)
MCHC RBC AUTO-ENTMCNC: 33.2 G/DL (ref 32–36)
MCV RBC AUTO: 96 FL (ref 82–98)
MICROSCOPIC COMMENT: ABNORMAL
MONOCYTES # BLD AUTO: 0.8 K/UL (ref 0.3–1)
MONOCYTES NFR BLD: 9.6 % (ref 4–15)
NEUTROPHILS # BLD AUTO: 5.4 K/UL (ref 1.8–7.7)
NEUTROPHILS NFR BLD: 66.8 % (ref 38–73)
NITRITE UR QL STRIP: NEGATIVE
NRBC BLD-RTO: 0 /100 WBC
PH UR STRIP: 6 [PH] (ref 5–8)
PLATELET # BLD AUTO: 279 K/UL (ref 150–450)
PMV BLD AUTO: 10 FL (ref 9.2–12.9)
POTASSIUM SERPL-SCNC: 3 MMOL/L (ref 3.5–5.1)
PROT SERPL-MCNC: 8 G/DL (ref 6–8.4)
PROT UR QL STRIP: ABNORMAL
RBC # BLD AUTO: 3.98 M/UL (ref 4–5.4)
RBC #/AREA URNS HPF: 5 /HPF (ref 0–4)
SARS-COV-2 RDRP RESP QL NAA+PROBE: NEGATIVE
SODIUM SERPL-SCNC: 138 MMOL/L (ref 136–145)
SP GR UR STRIP: >=1.03 (ref 1–1.03)
SQUAMOUS #/AREA URNS HPF: 25 /HPF
URN SPEC COLLECT METH UR: ABNORMAL
UROBILINOGEN UR STRIP-ACNC: 1 EU/DL
WBC # BLD AUTO: 8.02 K/UL (ref 3.9–12.7)
WBC #/AREA URNS HPF: 8 /HPF (ref 0–5)

## 2022-06-17 PROCEDURE — 87389 HIV-1 AG W/HIV-1&-2 AB AG IA: CPT | Performed by: EMERGENCY MEDICINE

## 2022-06-17 PROCEDURE — 25000003 PHARM REV CODE 250: Performed by: EMERGENCY MEDICINE

## 2022-06-17 PROCEDURE — 96376 TX/PRO/DX INJ SAME DRUG ADON: CPT

## 2022-06-17 PROCEDURE — 83690 ASSAY OF LIPASE: CPT | Performed by: EMERGENCY MEDICINE

## 2022-06-17 PROCEDURE — 86803 HEPATITIS C AB TEST: CPT | Performed by: EMERGENCY MEDICINE

## 2022-06-17 PROCEDURE — 96375 TX/PRO/DX INJ NEW DRUG ADDON: CPT

## 2022-06-17 PROCEDURE — 85025 COMPLETE CBC W/AUTO DIFF WBC: CPT | Performed by: NURSE PRACTITIONER

## 2022-06-17 PROCEDURE — 63600175 PHARM REV CODE 636 W HCPCS: Performed by: STUDENT IN AN ORGANIZED HEALTH CARE EDUCATION/TRAINING PROGRAM

## 2022-06-17 PROCEDURE — 63600175 PHARM REV CODE 636 W HCPCS: Performed by: EMERGENCY MEDICINE

## 2022-06-17 PROCEDURE — 80053 COMPREHEN METABOLIC PANEL: CPT | Performed by: EMERGENCY MEDICINE

## 2022-06-17 PROCEDURE — G0378 HOSPITAL OBSERVATION PER HR: HCPCS

## 2022-06-17 PROCEDURE — 99291 CRITICAL CARE FIRST HOUR: CPT | Mod: 25

## 2022-06-17 PROCEDURE — 81000 URINALYSIS NONAUTO W/SCOPE: CPT | Performed by: EMERGENCY MEDICINE

## 2022-06-17 PROCEDURE — 96374 THER/PROPH/DIAG INJ IV PUSH: CPT

## 2022-06-17 PROCEDURE — 81025 URINE PREGNANCY TEST: CPT | Performed by: EMERGENCY MEDICINE

## 2022-06-17 PROCEDURE — 96361 HYDRATE IV INFUSION ADD-ON: CPT

## 2022-06-17 PROCEDURE — U0002 COVID-19 LAB TEST NON-CDC: HCPCS | Performed by: EMERGENCY MEDICINE

## 2022-06-17 RX ORDER — SODIUM CHLORIDE 0.9 % (FLUSH) 0.9 %
10 SYRINGE (ML) INJECTION
Status: DISCONTINUED | OUTPATIENT
Start: 2022-06-18 | End: 2022-06-20 | Stop reason: HOSPADM

## 2022-06-17 RX ORDER — ONDANSETRON 8 MG/1
8 TABLET, ORALLY DISINTEGRATING ORAL EVERY 8 HOURS PRN
Status: DISCONTINUED | OUTPATIENT
Start: 2022-06-18 | End: 2022-06-20 | Stop reason: HOSPADM

## 2022-06-17 RX ORDER — SODIUM CHLORIDE, SODIUM LACTATE, POTASSIUM CHLORIDE, CALCIUM CHLORIDE 600; 310; 30; 20 MG/100ML; MG/100ML; MG/100ML; MG/100ML
INJECTION, SOLUTION INTRAVENOUS CONTINUOUS
Status: DISCONTINUED | OUTPATIENT
Start: 2022-06-17 | End: 2022-06-19

## 2022-06-17 RX ORDER — KETOROLAC TROMETHAMINE 30 MG/ML
30 INJECTION, SOLUTION INTRAMUSCULAR; INTRAVENOUS ONCE
Status: COMPLETED | OUTPATIENT
Start: 2022-06-17 | End: 2022-06-17

## 2022-06-17 RX ORDER — MORPHINE SULFATE 4 MG/ML
4 INJECTION, SOLUTION INTRAMUSCULAR; INTRAVENOUS
Status: COMPLETED | OUTPATIENT
Start: 2022-06-17 | End: 2022-06-17

## 2022-06-17 RX ORDER — PROCHLORPERAZINE EDISYLATE 5 MG/ML
5 INJECTION INTRAMUSCULAR; INTRAVENOUS EVERY 6 HOURS PRN
Status: DISCONTINUED | OUTPATIENT
Start: 2022-06-18 | End: 2022-06-20 | Stop reason: HOSPADM

## 2022-06-17 RX ORDER — HYDROCODONE BITARTRATE AND ACETAMINOPHEN 5; 325 MG/1; MG/1
1 TABLET ORAL EVERY 4 HOURS PRN
Status: DISCONTINUED | OUTPATIENT
Start: 2022-06-18 | End: 2022-06-18

## 2022-06-17 RX ORDER — ONDANSETRON 2 MG/ML
4 INJECTION INTRAMUSCULAR; INTRAVENOUS
Status: COMPLETED | OUTPATIENT
Start: 2022-06-17 | End: 2022-06-17

## 2022-06-17 RX ORDER — IBUPROFEN 600 MG/1
600 TABLET ORAL EVERY 6 HOURS PRN
Status: DISCONTINUED | OUTPATIENT
Start: 2022-06-18 | End: 2022-06-20 | Stop reason: HOSPADM

## 2022-06-17 RX ORDER — HYDROCODONE BITARTRATE AND ACETAMINOPHEN 10; 325 MG/1; MG/1
1 TABLET ORAL EVERY 4 HOURS PRN
Status: DISCONTINUED | OUTPATIENT
Start: 2022-06-18 | End: 2022-06-18

## 2022-06-17 RX ADMIN — ONDANSETRON 4 MG: 2 INJECTION INTRAMUSCULAR; INTRAVENOUS at 07:06

## 2022-06-17 RX ADMIN — KETOROLAC TROMETHAMINE 30 MG: 30 INJECTION, SOLUTION INTRAMUSCULAR; INTRAVENOUS at 11:06

## 2022-06-17 RX ADMIN — SODIUM CHLORIDE, SODIUM LACTATE, POTASSIUM CHLORIDE, AND CALCIUM CHLORIDE: .6; .31; .03; .02 INJECTION, SOLUTION INTRAVENOUS at 11:06

## 2022-06-17 RX ADMIN — MORPHINE SULFATE 4 MG: 4 INJECTION, SOLUTION INTRAMUSCULAR; INTRAVENOUS at 08:06

## 2022-06-17 RX ADMIN — SODIUM CHLORIDE 1000 ML: 0.9 INJECTION, SOLUTION INTRAVENOUS at 07:06

## 2022-06-17 RX ADMIN — MORPHINE SULFATE 2 MG: 4 INJECTION, SOLUTION INTRAMUSCULAR; INTRAVENOUS at 07:06

## 2022-06-17 NOTE — FIRST PROVIDER EVALUATION
Emergency Department TeleTriage Encounter Note      CHIEF COMPLAINT    Chief Complaint   Patient presents with    Abdominal Pain     Pt to ER with c/o generalized body aches, abdominal pain, N/V, loss of appetite x Monday.        VITAL SIGNS   Initial Vitals [06/17/22 1532]   BP Pulse Resp Temp SpO2   -- 101 20 98.3 °F (36.8 °C) 97 %      MAP       --            ALLERGIES    Review of patient's allergies indicates:  No Known Allergies    PROVIDER TRIAGE NOTE  See cc.  Lower abd pain.  Denies dysuria.  Reports decreased urination.  Neg vag d/c/bleeding.  Went to urgent care on wed, ua was neg.  Went to [facility] ed, sat for 10h then left.  Across entire lower abd.  Took some promethazine for nausea and vomiting, it was not effective.        ORDERS  Labs Reviewed   URINALYSIS, REFLEX TO URINE CULTURE   POCT URINE PREGNANCY       ED Orders (720h ago, onward)    Start Ordered     Status Ordering Provider    06/17/22 1608 06/17/22 1607  Vital signs  Every 2 hours         Ordered CHAMPAGNE, ANUSHA A.    06/17/22 1607 06/17/22 1607  Diet NPO  Diet effective now         Ordered CHAMPAGNE, ANUSHA A.    06/17/22 1607 06/17/22 1607  Insert peripheral IV  Once         Ordered CHAMPAGNE, ANUSHA A.    06/17/22 1607 06/17/22 1607  CBC W/ AUTO DIFFERENTIAL  STAT         Ordered CHAMPAGNE, ANUSHA A.    06/17/22 1607 06/17/22 1607  Comp. Metabolic Panel  STAT         Ordered CHAMPAGNE, ANUSHA A.    06/17/22 1607 06/17/22 1607  Lipase  STAT         Ordered CHAMPAGNE, ANUSHA A.    06/17/22 1536 06/17/22 1536  POCT urine pregnancy  Once         Ordered JUDE MCLEOD    06/17/22 1536 06/17/22 1536  Urinalysis, Reflex to Urine Culture Urine, Clean Catch  STAT         Ordered JUDE MCLEOD            Virtual Visit Note: The provider triage portion of this emergency department evaluation and documentation was performed via [employer], a HIPAA-compliant telemedicine application, in concert with a tele-presenter in the room. A face to face patient  evaluation with one of my colleagues will occur once the patient is placed in an emergency department room.      DISCLAIMER: This note was prepared with [employer] voice recognition transcription software. Garbled syntax, mangled pronouns, and other bizarre constructions may be attributed to that software system.

## 2022-06-17 NOTE — FIRST PROVIDER EVALUATION
Emergency Department TeleTriage Encounter Note      CHIEF COMPLAINT    Chief Complaint   Patient presents with    Abdominal Pain     Pt to ER with c/o generalized body aches, abdominal pain, N/V, loss of appetite x Monday.        VITAL SIGNS   Initial Vitals [06/17/22 1532]   BP Pulse Resp Temp SpO2   -- 101 20 98.3 °F (36.8 °C) 97 %      MAP       --            ALLERGIES    Review of patient's allergies indicates:  No Known Allergies    PROVIDER TRIAGE NOTE  ***      ORDERS  Labs Reviewed   URINALYSIS, REFLEX TO URINE CULTURE   POCT URINE PREGNANCY       ED Orders (720h ago, onward)    Start Ordered     Status Ordering Provider    06/17/22 1536 06/17/22 1536  POCT urine pregnancy  Once         Ordered JUDE MCLEOD    06/17/22 1536 06/17/22 1536  Urinalysis, Reflex to Urine Culture Urine, Clean Catch  STAT         Ordered JUDE MCLEOD            Virtual Visit Note: The provider triage portion of this emergency department evaluation and documentation was performed via [employer], a HIPAA-compliant telemedicine application, in concert with a tele-presenter in the room. A face to face patient evaluation with one of my colleagues will occur once the patient is placed in an emergency department room.      DISCLAIMER: This note was prepared with [employer] voice recognition transcription software. Garbled syntax, mangled pronouns, and other bizarre constructions may be attributed to that software system.

## 2022-06-17 NOTE — ED PROVIDER NOTES
"Encounter Date: 6/17/2022    SCRIBE #1 NOTE: I, Norman Devante, am scribing for, and in the presence of, Irina Gordon MD.       History     Chief Complaint   Patient presents with    Abdominal Pain     Pt to ER with c/o generalized body aches, abdominal pain, N/V, loss of appetite x Monday.      Time seen by provider: 6:30 PM    This is a 36 y.o. female with no significant PMHx who presents with complaint of nausea, vomiting, and diffuse abdominal pain that began 4 days ago. She also complains of the abdominal pain radiating around to her flank and lower back and generalized body aches. The patient reports going to urgent care 2 days ago for the same issue where she was given promethazine pills. The patient also reports that her PCP "told her she was backed up" and gave her a bottle of magnesium citrate which she drank last night. She states she was able to produce a BM but still has abdominal pain. The patient states she cannot tolerate any fluids or food. She denies fever, dysuria, and hematemesis. She denies consuming alcohol or using any drugs. This is the extent of the patient's complaints at this time.    The history is provided by the patient.     Review of patient's allergies indicates:  No Known Allergies  Past Medical History:   Diagnosis Date    Depression      History reviewed. No pertinent surgical history.  History reviewed. No pertinent family history.  Social History     Tobacco Use    Smoking status: Never Smoker   Substance Use Topics    Alcohol use: Yes     Comment: occassional    Drug use: No     Review of Systems   Constitutional: Negative for chills and fever.   HENT: Negative for congestion and sore throat.    Eyes: Negative for pain.   Respiratory: Negative for shortness of breath and wheezing.    Cardiovascular: Negative for chest pain.   Gastrointestinal: Positive for abdominal pain, diarrhea, nausea and vomiting.        Negative for hematemesis   Genitourinary: Positive for " flank pain. Negative for dysuria.   Musculoskeletal: Positive for back pain and myalgias (generalized).   Skin: Negative for color change.   Neurological: Negative for weakness and headaches.   Hematological: Does not bruise/bleed easily.   Psychiatric/Behavioral: Negative for suicidal ideas.   All other systems reviewed and are negative.      Physical Exam     Initial Vitals   BP Pulse Resp Temp SpO2   06/17/22 1856 06/17/22 1532 06/17/22 1532 06/17/22 1532 06/17/22 1532   (!) 92/54 101 20 98.3 °F (36.8 °C) 97 %      MAP       --                Physical Exam    Nursing note and vitals reviewed.  Constitutional: She appears well-developed and well-nourished. She is not diaphoretic. No distress.   HENT:   Head: Normocephalic and atraumatic.   Eyes: Conjunctivae are normal.   Neck: Neck supple.   Normal range of motion.  Cardiovascular: Normal rate and regular rhythm.   Pulmonary/Chest: Breath sounds normal. No respiratory distress. She has no wheezes. She has no rales.   Abdominal: Abdomen is soft. Bowel sounds are normal. She exhibits distension. There is abdominal tenderness.   Mild abdominal distension. Increased bowel sounds. Diffuse abdominal tenderness. There is no rebound.   Musculoskeletal:         General: No edema.      Cervical back: Normal range of motion and neck supple.     Neurological: She is alert and oriented to person, place, and time.   Ambulatory with steady gait.   Skin: Skin is warm and dry.   Psychiatric: She has a normal mood and affect.         ED Course   Critical Care    Date/Time: 6/18/2022 4:06 AM  Performed by: Hima Gavin MD  Authorized by: Hima Gavin MD   Direct patient critical care time: 20 minutes  Additional history critical care time: 5 minutes  Ordering / reviewing critical care time: 5 minutes  Documentation critical care time: 5 minutes  Consulting other physicians critical care time: 10 minutes  Total critical care time (exclusive of procedural time) : 45  minutes  Critical care time was exclusive of separately billable procedures and treating other patients and teaching time.  Critical care was necessary to treat or prevent imminent or life-threatening deterioration of the following conditions: ovarian torsion.  Critical care was time spent personally by me on the following activities: blood draw for specimens, development of treatment plan with patient or surrogate, discussions with consultants, interpretation of cardiac output measurements, examination of patient, evaluation of patient's response to treatment, obtaining history from patient or surrogate, ordering and performing treatments and interventions, ordering and review of radiographic studies, ordering and review of laboratory studies, re-evaluation of patient's condition and review of old charts.        Labs Reviewed   URINALYSIS, REFLEX TO URINE CULTURE - Abnormal; Notable for the following components:       Result Value    Specific Gravity, UA >=1.030 (*)     Protein, UA 2+ (*)     Ketones, UA 1+ (*)     Bilirubin (UA) 2+ (*)     Occult Blood UA 1+ (*)     All other components within normal limits    Narrative:     Specimen Source->Urine   CBC W/ AUTO DIFFERENTIAL - Abnormal; Notable for the following components:    RBC 3.98 (*)     MCH 31.9 (*)     All other components within normal limits   URINALYSIS MICROSCOPIC - Abnormal; Notable for the following components:    RBC, UA 5 (*)     WBC, UA 8 (*)     Hyaline Casts, UA 5 (*)     All other components within normal limits    Narrative:     Specimen Source->Urine   COMPREHENSIVE METABOLIC PANEL - Abnormal; Notable for the following components:    Potassium 3.0 (*)     CO2 19 (*)     All other components within normal limits   HIV 1 / 2 ANTIBODY    Narrative:     Release to patient->Immediate   HEPATITIS C ANTIBODY    Narrative:     Release to patient->Immediate   LIPASE   POCT URINE PREGNANCY   SARS-COV-2 RDRP GENE          Imaging Results          US  Pelvis Comp with Transvag NON-OB (xpd (Final result)  Result time 06/17/22 21:52:40    Final result by Waldemar Mtz MD (06/17/22 21:52:40)                 Impression:      Right adnexal mass presumed to represent an enlarged right ovary with probable associated right ovarian dermoid.  There are complex cystic, fatty and soft tissue attenuation components noted.  Recommend gyn follow-up and clinical correlation.      Electronically signed by: Waldemar Mtz  Date:    06/17/2022  Time:    21:52             Narrative:    EXAMINATION:  US PELVIS COMP WITH TRANSVAG NON-OB (XPD)    CLINICAL HISTORY:  pelvic mass;    TECHNIQUE:  Transabdominal sonography of the pelvis was performed, followed by transvaginal sonography to better evaluate the uterus and ovaries.    COMPARISON:  Correlation with CT 06/17/2022    FINDINGS:  Uterus:    Size: 8.5 x 5.1 x 5.6 cm    Masses: None    Endometrium: Normal in this pre menopausal patient, measuring 9 mm.    Right ovary:    Size: 6.8 x 5.0 x 6.9 cm    Appearance: Right ovary is enlarged with multiple minimally complex cystic lesions including a 4.4 cm and 4.2 cm lesion.  There is a 2.3 cm predominately echogenic component with shadowing which corresponds to the fat density component on CT.  I favor an ovarian dermoid.    Vascular flow: Normal.    Left ovary:    Size: 3.2 x 2.0 x 2.8 cm    Appearance: Normal    Vascular Flow: Normal.    Free Fluid:    None.                               CT Abdomen Pelvis  Without Contrast (Final result)  Result time 06/17/22 19:50:38    Final result by Handy Macedo MD (06/17/22 19:50:38)                 Impression:      1. Large 9 x 8 cm heterogenous right adnexal/ovarian mass lesion.  Small 2 cm internal fat density component is seen which may be seen with a dermoid tumor, however other potential neoplastic process or malignancy is not excluded.  Lesion size may predispose for potential ovarian torsion.  Ob gyn referral and future follow-up are  advised.  2. No additional acute intra-abdominal abnormalities identified.      Electronically signed by: Handy Macedo MD  Date:    06/17/2022  Time:    19:50             Narrative:    EXAMINATION:  CT ABDOMEN PELVIS WITHOUT CONTRAST    CLINICAL HISTORY:  Nausea/vomiting;Abdominal pain, acute, nonlocalized;    TECHNIQUE:  Low dose axial images, sagittal and coronal reformations were obtained from the lung bases to the pubic symphysis.  Oral contrast was not administered.    COMPARISON:  None    FINDINGS:  The visualized portion of the heart is unremarkable.  The lung bases are clear.    Small inferior right hepatic lobe hypodensity, probable cyst is seen.  There is no intra-or extrahepatic biliary ductal dilatation.  Gallbladder is contracted.  The stomach, pancreas, spleen, and adrenal glands are unremarkable.    Kidneys show no evidence of stones or hydronephrosis. Ureters are difficult to track distally, however no stones are seen along their expected courses.  Urinary bladder is nondistended.  Uterus is unremarkable.  Large heterogenous right adnexal/ovarian mass is seen measuring at least 9 x 8 cm.  Small 2 cm fat density component is seen within this lesion.    Appendix is visualized and is unremarkable.  The visualized loops of small and large bowel show no evidence of obstruction or inflammation.  No free air or free fluid.    Aorta tapers normally.    No acute osseous abnormality identified. Subcutaneous soft tissue structures are unremarkable.                                 Medications   sodium chloride 0.9% flush 10 mL (has no administration in time range)   ondansetron disintegrating tablet 8 mg (has no administration in time range)   prochlorperazine injection Soln 5 mg (has no administration in time range)   ibuprofen tablet 600 mg (has no administration in time range)   HYDROcodone-acetaminophen 5-325 mg per tablet 1 tablet (has no administration in time range)   HYDROcodone-acetaminophen  mg  per tablet 1 tablet (has no administration in time range)   lactated ringers infusion ( Intravenous Restarted 6/18/22 0103)   HYDROmorphone (PF) injection 0.4 mg (has no administration in time range)   oxyCODONE immediate release tablet 5 mg (has no administration in time range)   meperidine (PF) injection 12.5 mg (has no administration in time range)   prochlorperazine injection Soln 5 mg (has no administration in time range)   diphenhydrAMINE injection 12.5 mg (has no administration in time range)   sodium chloride 0.9% flush 3 mL (has no administration in time range)   sodium chloride 0.9% bolus 1,000 mL (0 mLs Intravenous Stopped 6/17/22 2250)   ondansetron injection 4 mg (4 mg Intravenous Given 6/17/22 1928)   morphine injection 4 mg (2 mg Intravenous Given 6/17/22 1934)   morphine injection 4 mg (4 mg Intravenous Given 6/17/22 2046)   ketorolac injection 30 mg (30 mg Intravenous Given 6/17/22 2337)     Medical Decision Making:   History:   Old Medical Records: I decided to obtain old medical records.  Old Records Summarized: other records, records from another hospital and records from previous admission(s).  Initial Assessment:   6:30PM:  Patient is a 36-year-old female who presents to the emergency department with abdominal pain along with nausea/vomiting.  Patient appears uncomfortable but nontoxic.  She does have diffuse tenderness on exam.  Will plan for labs, imaging, will continue to follow and reassess.  Clinical Tests:   Lab Tests: Ordered and Reviewed  Radiological Study: Reviewed and Ordered  ED Management:  Given this woman's concerning ultrasound and intense persistent pain with marginal blood pressure despite administration medications have consult and Gynecology use evaluate the patient at the bedside, have a significant concern for potential intermittent torsion at this time.  Plan be for admission at this time, ongoing monitoring.    8:01 PM:  Patient's CT shows a concerning 9 x 8 cm adnexal  mass.  Will plan for an ultrasound for further characterization.  I updated the patient regarding the results along with plan for ultrasound.  Will continue to follow.          Scribe Attestation:   Scribe #1: I performed the above scribed service and the documentation accurately describes the services I performed. I attest to the accuracy of the note.        ED Course as of 06/18/22 0408 Fri Jun 17, 2022 2216 Reason suspicion of the bedside, still having some persistent abdominal pain, does have an element of hypokalemia potential is result of nausea and vomiting.  Ultrasound is concerning for fairly significant dermoid lesion.  Will contact gyn resident on call about appropriate plan [TK]   2306 Trial of p.o. as well as ambulation failed.  Contacted Gynecology resident on-call.  Will evaluate at the bedside. [TK]      ED Course User Index  [TK] Hima Gavin MD       Physician Attestation for Scribe: I, hima gavin, reviewed documentation as scribed in my presence, which is both accurate and complete.         Clinical Impression:   Final diagnoses:  [N83.201] Cyst of right ovary (Primary)  [R10.30] Lower abdominal pain          ED Disposition Condition    Observation               Hima Gavin MD  06/18/22 5591

## 2022-06-17 NOTE — ED TRIAGE NOTES
"Pt arrived with c/o abd pain and nausea since Monday.  Pt states her doctor "told her she was backed up," so she drank magnesium citrate last night.  Pt states she was able to have a BM, but is still having abd pain.  Pt states she is unable to tolerate fluids or food.  Pt also c/o generalized body aches.  "

## 2022-06-18 ENCOUNTER — ANESTHESIA EVENT (OUTPATIENT)
Dept: SURGERY | Facility: OTHER | Age: 37
End: 2022-06-18
Payer: MEDICAID

## 2022-06-18 ENCOUNTER — ANESTHESIA (OUTPATIENT)
Dept: SURGERY | Facility: OTHER | Age: 37
End: 2022-06-18
Payer: MEDICAID

## 2022-06-18 PROBLEM — N83.201 CYST OF RIGHT OVARY: Status: ACTIVE | Noted: 2022-06-18

## 2022-06-18 PROBLEM — R10.30 LOWER ABDOMINAL PAIN: Status: ACTIVE | Noted: 2022-06-18

## 2022-06-18 PROCEDURE — 37000008 HC ANESTHESIA 1ST 15 MINUTES: Performed by: OBSTETRICS & GYNECOLOGY

## 2022-06-18 PROCEDURE — 25000003 PHARM REV CODE 250: Performed by: NURSE ANESTHETIST, CERTIFIED REGISTERED

## 2022-06-18 PROCEDURE — 63600175 PHARM REV CODE 636 W HCPCS: Performed by: STUDENT IN AN ORGANIZED HEALTH CARE EDUCATION/TRAINING PROGRAM

## 2022-06-18 PROCEDURE — P9045 ALBUMIN (HUMAN), 5%, 250 ML: HCPCS | Mod: JG | Performed by: NURSE ANESTHETIST, CERTIFIED REGISTERED

## 2022-06-18 PROCEDURE — 27201423 OPTIME MED/SURG SUP & DEVICES STERILE SUPPLY: Performed by: OBSTETRICS & GYNECOLOGY

## 2022-06-18 PROCEDURE — 36000708 HC OR TIME LEV III 1ST 15 MIN: Performed by: OBSTETRICS & GYNECOLOGY

## 2022-06-18 PROCEDURE — 58720 PR REMOVAL OF OVARY/TUBE(S): ICD-10-PCS | Mod: ,,, | Performed by: OBSTETRICS & GYNECOLOGY

## 2022-06-18 PROCEDURE — 36000709 HC OR TIME LEV III EA ADD 15 MIN: Performed by: OBSTETRICS & GYNECOLOGY

## 2022-06-18 PROCEDURE — 58720 REMOVAL OF OVARY/TUBE(S): CPT | Mod: ,,, | Performed by: OBSTETRICS & GYNECOLOGY

## 2022-06-18 PROCEDURE — 71000039 HC RECOVERY, EACH ADD'L HOUR: Performed by: OBSTETRICS & GYNECOLOGY

## 2022-06-18 PROCEDURE — 96374 THER/PROPH/DIAG INJ IV PUSH: CPT | Mod: 59

## 2022-06-18 PROCEDURE — 37000009 HC ANESTHESIA EA ADD 15 MINS: Performed by: OBSTETRICS & GYNECOLOGY

## 2022-06-18 PROCEDURE — 88305 TISSUE EXAM BY PATHOLOGIST: CPT | Mod: 26,,, | Performed by: PATHOLOGY

## 2022-06-18 PROCEDURE — 25000003 PHARM REV CODE 250: Performed by: ANESTHESIOLOGY

## 2022-06-18 PROCEDURE — 63600175 PHARM REV CODE 636 W HCPCS: Performed by: NURSE ANESTHETIST, CERTIFIED REGISTERED

## 2022-06-18 PROCEDURE — 96376 TX/PRO/DX INJ SAME DRUG ADON: CPT

## 2022-06-18 PROCEDURE — G0378 HOSPITAL OBSERVATION PER HR: HCPCS

## 2022-06-18 PROCEDURE — 96375 TX/PRO/DX INJ NEW DRUG ADDON: CPT

## 2022-06-18 PROCEDURE — 00840 ANES IPER PX LOWER ABD NOS: CPT | Performed by: OBSTETRICS & GYNECOLOGY

## 2022-06-18 PROCEDURE — 88305 TISSUE EXAM BY PATHOLOGIST: CPT | Performed by: PATHOLOGY

## 2022-06-18 PROCEDURE — 25000003 PHARM REV CODE 250: Performed by: STUDENT IN AN ORGANIZED HEALTH CARE EDUCATION/TRAINING PROGRAM

## 2022-06-18 PROCEDURE — 71000033 HC RECOVERY, INTIAL HOUR: Performed by: OBSTETRICS & GYNECOLOGY

## 2022-06-18 PROCEDURE — 27100025 HC TUBING, SET FLUID WARMER: Performed by: ANESTHESIOLOGY

## 2022-06-18 PROCEDURE — 96361 HYDRATE IV INFUSION ADD-ON: CPT | Mod: 59

## 2022-06-18 PROCEDURE — 88305 TISSUE EXAM BY PATHOLOGIST: ICD-10-PCS | Mod: 26,,, | Performed by: PATHOLOGY

## 2022-06-18 RX ORDER — ONDANSETRON 2 MG/ML
INJECTION INTRAMUSCULAR; INTRAVENOUS
Status: DISCONTINUED | OUTPATIENT
Start: 2022-06-18 | End: 2022-06-18

## 2022-06-18 RX ORDER — HYDROMORPHONE HYDROCHLORIDE 1 MG/ML
0.5 INJECTION, SOLUTION INTRAMUSCULAR; INTRAVENOUS; SUBCUTANEOUS
Status: DISCONTINUED | OUTPATIENT
Start: 2022-06-18 | End: 2022-06-19

## 2022-06-18 RX ORDER — IBUPROFEN 600 MG/1
600 TABLET ORAL EVERY 6 HOURS
Status: DISCONTINUED | OUTPATIENT
Start: 2022-06-19 | End: 2022-06-20 | Stop reason: HOSPADM

## 2022-06-18 RX ORDER — HYDROMORPHONE HYDROCHLORIDE 2 MG/ML
INJECTION, SOLUTION INTRAMUSCULAR; INTRAVENOUS; SUBCUTANEOUS
Status: DISCONTINUED | OUTPATIENT
Start: 2022-06-18 | End: 2022-06-18

## 2022-06-18 RX ORDER — DIPHENHYDRAMINE HYDROCHLORIDE 50 MG/ML
25 INJECTION INTRAMUSCULAR; INTRAVENOUS EVERY 4 HOURS PRN
Status: DISCONTINUED | OUTPATIENT
Start: 2022-06-18 | End: 2022-06-20 | Stop reason: HOSPADM

## 2022-06-18 RX ORDER — DEXAMETHASONE SODIUM PHOSPHATE 4 MG/ML
INJECTION, SOLUTION INTRA-ARTICULAR; INTRALESIONAL; INTRAMUSCULAR; INTRAVENOUS; SOFT TISSUE
Status: DISCONTINUED | OUTPATIENT
Start: 2022-06-18 | End: 2022-06-18

## 2022-06-18 RX ORDER — SODIUM CHLORIDE 0.9 % (FLUSH) 0.9 %
3 SYRINGE (ML) INJECTION
Status: DISCONTINUED | OUTPATIENT
Start: 2022-06-18 | End: 2022-06-20 | Stop reason: HOSPADM

## 2022-06-18 RX ORDER — OXYCODONE HYDROCHLORIDE 5 MG/1
5 TABLET ORAL
Status: DISCONTINUED | OUTPATIENT
Start: 2022-06-18 | End: 2022-06-18

## 2022-06-18 RX ORDER — FENTANYL CITRATE 50 UG/ML
INJECTION, SOLUTION INTRAMUSCULAR; INTRAVENOUS
Status: DISCONTINUED | OUTPATIENT
Start: 2022-06-18 | End: 2022-06-18

## 2022-06-18 RX ORDER — LIDOCAINE HCL/PF 100 MG/5ML
SYRINGE (ML) INTRAVENOUS
Status: DISCONTINUED | OUTPATIENT
Start: 2022-06-18 | End: 2022-06-18

## 2022-06-18 RX ORDER — DIPHENHYDRAMINE HYDROCHLORIDE 50 MG/ML
INJECTION INTRAMUSCULAR; INTRAVENOUS
Status: DISCONTINUED | OUTPATIENT
Start: 2022-06-18 | End: 2022-06-18

## 2022-06-18 RX ORDER — OXYCODONE AND ACETAMINOPHEN 5; 325 MG/1; MG/1
1 TABLET ORAL EVERY 4 HOURS PRN
Status: DISCONTINUED | OUTPATIENT
Start: 2022-06-18 | End: 2022-06-19

## 2022-06-18 RX ORDER — PROPOFOL 10 MG/ML
VIAL (ML) INTRAVENOUS
Status: DISCONTINUED | OUTPATIENT
Start: 2022-06-18 | End: 2022-06-18

## 2022-06-18 RX ORDER — HYDROMORPHONE HYDROCHLORIDE 1 MG/ML
1 INJECTION, SOLUTION INTRAMUSCULAR; INTRAVENOUS; SUBCUTANEOUS EVERY 4 HOURS PRN
Status: DISCONTINUED | OUTPATIENT
Start: 2022-06-18 | End: 2022-06-20 | Stop reason: HOSPADM

## 2022-06-18 RX ORDER — OXYCODONE AND ACETAMINOPHEN 10; 325 MG/1; MG/1
1 TABLET ORAL EVERY 4 HOURS PRN
Status: DISCONTINUED | OUTPATIENT
Start: 2022-06-18 | End: 2022-06-19

## 2022-06-18 RX ORDER — DIPHENHYDRAMINE HYDROCHLORIDE 50 MG/ML
12.5 INJECTION INTRAMUSCULAR; INTRAVENOUS EVERY 30 MIN PRN
Status: DISCONTINUED | OUTPATIENT
Start: 2022-06-18 | End: 2022-06-20 | Stop reason: HOSPADM

## 2022-06-18 RX ORDER — MUPIROCIN 20 MG/G
OINTMENT TOPICAL 2 TIMES DAILY
Status: DISCONTINUED | OUTPATIENT
Start: 2022-06-18 | End: 2022-06-20 | Stop reason: HOSPADM

## 2022-06-18 RX ORDER — PROCHLORPERAZINE EDISYLATE 5 MG/ML
5 INJECTION INTRAMUSCULAR; INTRAVENOUS EVERY 30 MIN PRN
Status: DISCONTINUED | OUTPATIENT
Start: 2022-06-18 | End: 2022-06-20 | Stop reason: HOSPADM

## 2022-06-18 RX ORDER — HYDROMORPHONE HYDROCHLORIDE 1 MG/ML
0.5 INJECTION, SOLUTION INTRAMUSCULAR; INTRAVENOUS; SUBCUTANEOUS
Status: DISCONTINUED | OUTPATIENT
Start: 2022-06-18 | End: 2022-06-18

## 2022-06-18 RX ORDER — ROCURONIUM BROMIDE 10 MG/ML
INJECTION, SOLUTION INTRAVENOUS
Status: DISCONTINUED | OUTPATIENT
Start: 2022-06-18 | End: 2022-06-18

## 2022-06-18 RX ORDER — ALBUMIN HUMAN 50 G/1000ML
SOLUTION INTRAVENOUS CONTINUOUS PRN
Status: DISCONTINUED | OUTPATIENT
Start: 2022-06-18 | End: 2022-06-18

## 2022-06-18 RX ORDER — HYDROMORPHONE HYDROCHLORIDE 2 MG/ML
0.4 INJECTION, SOLUTION INTRAMUSCULAR; INTRAVENOUS; SUBCUTANEOUS EVERY 5 MIN PRN
Status: DISCONTINUED | OUTPATIENT
Start: 2022-06-18 | End: 2022-06-18

## 2022-06-18 RX ORDER — DIPHENHYDRAMINE HCL 25 MG
25 CAPSULE ORAL EVERY 4 HOURS PRN
Status: DISCONTINUED | OUTPATIENT
Start: 2022-06-18 | End: 2022-06-20 | Stop reason: HOSPADM

## 2022-06-18 RX ORDER — KETOROLAC TROMETHAMINE 30 MG/ML
30 INJECTION, SOLUTION INTRAMUSCULAR; INTRAVENOUS EVERY 8 HOURS
Status: COMPLETED | OUTPATIENT
Start: 2022-06-18 | End: 2022-06-18

## 2022-06-18 RX ORDER — BISACODYL 10 MG
10 SUPPOSITORY, RECTAL RECTAL DAILY PRN
Status: DISCONTINUED | OUTPATIENT
Start: 2022-06-18 | End: 2022-06-20 | Stop reason: HOSPADM

## 2022-06-18 RX ORDER — ACETAMINOPHEN 10 MG/ML
INJECTION, SOLUTION INTRAVENOUS
Status: DISCONTINUED | OUTPATIENT
Start: 2022-06-18 | End: 2022-06-18

## 2022-06-18 RX ORDER — SUCCINYLCHOLINE CHLORIDE 20 MG/ML
INJECTION INTRAMUSCULAR; INTRAVENOUS
Status: DISCONTINUED | OUTPATIENT
Start: 2022-06-18 | End: 2022-06-18

## 2022-06-18 RX ORDER — MEPERIDINE HYDROCHLORIDE 25 MG/ML
12.5 INJECTION INTRAMUSCULAR; INTRAVENOUS; SUBCUTANEOUS ONCE AS NEEDED
Status: ACTIVE | OUTPATIENT
Start: 2022-06-18 | End: 2022-06-19

## 2022-06-18 RX ORDER — KETAMINE HCL IN 0.9 % NACL 50 MG/5 ML
SYRINGE (ML) INTRAVENOUS
Status: DISCONTINUED | OUTPATIENT
Start: 2022-06-18 | End: 2022-06-18

## 2022-06-18 RX ADMIN — ONDANSETRON HYDROCHLORIDE 4 MG: 2 INJECTION INTRAMUSCULAR; INTRAVENOUS at 04:06

## 2022-06-18 RX ADMIN — Medication 25 MG: at 02:06

## 2022-06-18 RX ADMIN — CARBOXYMETHYLCELLULOSE SODIUM 2 DROP: 2.5 SOLUTION/ DROPS OPHTHALMIC at 02:06

## 2022-06-18 RX ADMIN — FENTANYL CITRATE 50 MCG: 50 INJECTION, SOLUTION INTRAMUSCULAR; INTRAVENOUS at 03:06

## 2022-06-18 RX ADMIN — ROCURONIUM BROMIDE 20 MG: 10 SOLUTION INTRAVENOUS at 03:06

## 2022-06-18 RX ADMIN — ROCURONIUM BROMIDE 40 MG: 10 SOLUTION INTRAVENOUS at 02:06

## 2022-06-18 RX ADMIN — FENTANYL CITRATE 50 MCG: 50 INJECTION, SOLUTION INTRAMUSCULAR; INTRAVENOUS at 02:06

## 2022-06-18 RX ADMIN — ALBUMIN (HUMAN): 12.5 SOLUTION INTRAVENOUS at 02:06

## 2022-06-18 RX ADMIN — ONDANSETRON 8 MG: 8 TABLET, ORALLY DISINTEGRATING ORAL at 09:06

## 2022-06-18 RX ADMIN — KETOROLAC TROMETHAMINE 30 MG: 30 INJECTION, SOLUTION INTRAMUSCULAR; INTRAVENOUS at 09:06

## 2022-06-18 RX ADMIN — OXYCODONE AND ACETAMINOPHEN 1 TABLET: 10; 325 TABLET ORAL at 09:06

## 2022-06-18 RX ADMIN — SODIUM CHLORIDE, SODIUM LACTATE, POTASSIUM CHLORIDE, AND CALCIUM CHLORIDE: .6; .31; .03; .02 INJECTION, SOLUTION INTRAVENOUS at 02:06

## 2022-06-18 RX ADMIN — MUPIROCIN: 20 OINTMENT TOPICAL at 09:06

## 2022-06-18 RX ADMIN — SODIUM CHLORIDE, SODIUM LACTATE, POTASSIUM CHLORIDE, AND CALCIUM CHLORIDE: .6; .31; .03; .02 INJECTION, SOLUTION INTRAVENOUS at 03:06

## 2022-06-18 RX ADMIN — HYDROMORPHONE HYDROCHLORIDE 0.5 MG: 2 INJECTION INTRAMUSCULAR; INTRAVENOUS; SUBCUTANEOUS at 04:06

## 2022-06-18 RX ADMIN — Medication 25 MG: at 03:06

## 2022-06-18 RX ADMIN — FENTANYL CITRATE 50 MCG: 50 INJECTION, SOLUTION INTRAMUSCULAR; INTRAVENOUS at 04:06

## 2022-06-18 RX ADMIN — OXYCODONE 5 MG: 5 TABLET ORAL at 06:06

## 2022-06-18 RX ADMIN — DIPHENHYDRAMINE HYDROCHLORIDE 12.5 MG: 50 INJECTION, SOLUTION INTRAMUSCULAR; INTRAVENOUS at 02:06

## 2022-06-18 RX ADMIN — KETOROLAC TROMETHAMINE 30 MG: 30 INJECTION, SOLUTION INTRAMUSCULAR; INTRAVENOUS at 01:06

## 2022-06-18 RX ADMIN — KETOROLAC TROMETHAMINE 30 MG: 30 INJECTION, SOLUTION INTRAMUSCULAR; INTRAVENOUS at 06:06

## 2022-06-18 RX ADMIN — HYDROMORPHONE HYDROCHLORIDE 0.5 MG: 1 INJECTION, SOLUTION INTRAMUSCULAR; INTRAVENOUS; SUBCUTANEOUS at 12:06

## 2022-06-18 RX ADMIN — DEXAMETHASONE SODIUM PHOSPHATE 8 MG: 4 INJECTION, SOLUTION INTRAMUSCULAR; INTRAVENOUS at 02:06

## 2022-06-18 RX ADMIN — LIDOCAINE HYDROCHLORIDE 100 MG: 20 INJECTION, SOLUTION INTRAVENOUS at 02:06

## 2022-06-18 RX ADMIN — HYDROMORPHONE HYDROCHLORIDE 0.5 MG: 2 INJECTION INTRAMUSCULAR; INTRAVENOUS; SUBCUTANEOUS at 05:06

## 2022-06-18 RX ADMIN — HYDROMORPHONE HYDROCHLORIDE 0.5 MG: 2 INJECTION INTRAMUSCULAR; INTRAVENOUS; SUBCUTANEOUS at 03:06

## 2022-06-18 RX ADMIN — GLYCOPYRROLATE 0.2 MG: 0.2 INJECTION, SOLUTION INTRAMUSCULAR; INTRAVITREAL at 02:06

## 2022-06-18 RX ADMIN — SUGAMMADEX 200 MG: 100 INJECTION, SOLUTION INTRAVENOUS at 05:06

## 2022-06-18 RX ADMIN — ACETAMINOPHEN 1000 MG: 10 INJECTION, SOLUTION INTRAVENOUS at 02:06

## 2022-06-18 RX ADMIN — SUCCINYLCHOLINE CHLORIDE 140 MG: 20 INJECTION, SOLUTION INTRAMUSCULAR; INTRAVENOUS at 02:06

## 2022-06-18 RX ADMIN — ROCURONIUM BROMIDE 20 MG: 10 SOLUTION INTRAVENOUS at 04:06

## 2022-06-18 RX ADMIN — FENTANYL CITRATE 100 MCG: 50 INJECTION, SOLUTION INTRAMUSCULAR; INTRAVENOUS at 02:06

## 2022-06-18 RX ADMIN — PROPOFOL 180 MG: 10 INJECTION, EMULSION INTRAVENOUS at 02:06

## 2022-06-18 RX ADMIN — HYDROMORPHONE HYDROCHLORIDE 0.5 MG: 1 INJECTION, SOLUTION INTRAMUSCULAR; INTRAVENOUS; SUBCUTANEOUS at 04:06

## 2022-06-18 NOTE — NURSING
Patient arrived to the unit via w/c. Patient oriented to room and facility. VSS. Assessment completed per flow sheet. Patient schedule for surgery within 1 hour per MD. CHG bath completed. Family notified by patient. Safety maintained. Will continue to monitor.

## 2022-06-18 NOTE — OP NOTE
OPERATIVE REPORT    Date of procedure: 06/18/2022    PREOPERATIVE DIAGNOSIS  1. Right ovarian mass, suspected ovarian torsion  2. Obesity (BMI 32)  3. Depression    POSTOPERATIVE DIAGNOSIS  same    PROCEDURE:  1. Diagnostic laparoscopy with conversion to exploratory laparotomy with Pfannenstiel incision  2. Right salpingoopherectomy     SURGEON: Mariposa Kruger MD    ASSISTANT: Paulette Toribio MD PGY2    ANESTHESIA: General    COMPLICATIONS: None    EBL: 250 cc    IV FLUIDS: see anesthesia report    URINE OUTPUT: see anesthesia report cc    FINDINGS:  Normal external genitalia. Cervix does not pull. Abdomen initially laparoscopically and significant pelvic adhesive disease was noted. The right ovary was grossly necrotic and very clearly torsed multiple times. There was evidence of cyst rupture with free fluid in the pelvis.  Lysis of adhesions performed. There was extremely poor visualization of pelvic structures due to uterine adhesions to the anterior abdominal wall and bladder. The right ovary was unable to be manipulated out of the posterior cul-de-sac and de-torsed in a laparoscopic manner. Decision was then made to proceed via exploratory laparotomy via Pfannenstiel incision  Abdomen entered through the previous Pfannenstiel incision. The necrotic ovary was identified and elevated out of the pelvis and de-torsed. An RSO performed without difficulty. Specimens sent to pathology.     PROCEDURE:   Patient was taken to the operating room where general anesthesia was administered and found to be adequate.  She was prepped and draped in the dorsal lithotomy position.  A marie was placed. A weighted sterile speculum was placed in the vagina.  The anterior lip of the cervix was grasped with a single tooth tenaculum.  The uterus was sounded to approximately 7 cm.  A Alana uterine manipulator was placed in the endometrial cavity.    Gloves were changed.  A Veress needle was inserted into the umbilicus under tenting of  the anterior abdominal wall.  Placement into the peritoneal cavity was confirmed via saline drop test.  The abdomen was insufflated to 15mm Hg using Carbon dioxide.  A 10 mm infra umbilical skin incision was made with the scalpel.  A 10 mm Optiview trocar was advanced through this incision.  The patient was placed in deep Trendelenburg.  An 5 mm left lateral skin incision was made with a scalpel and a 5 mm trocar was advanced through this incision under visualization of the camera.  A 5 mm right lateral skin incision was made with the scalpel.  A 5 mm trocar was advanced through this incision under visualization of the camera.  Excellent hemostasis was noted.      Attention was turned to the pelvis and significant pelvic adhesive disease was noted. The uterus was noted to be significantly adhered to the anterior abdominal wall. The right ovary was adhered to structures deep in the posterior cul-de-sac and appeared grossly necrotic and very clearly torsed multiple times. There was evidence of cyst rupture with free fluid in the pelvis.  Lysis of adhesions performed for 1 hour. There was extremely poor visualization of pelvic structures due to uterine adhesions to the anterior abdominal wall and bladder. The right ovary was unable to be manipulated out of the posterior cul-de-sac and de-torsed in a laparoscopic manner. Decision was then made to proceed via exploratory laparotomy via Pfannenstiel incision  Abdomen entered through previous Pfannenstiel incision. The skin incision was made with a scalpel and carried down to the fascia with electrocautery. The fascia was incised sharply and was extended with Faust Scissors. The superior then inferior aspects of the fascia was grasped with [facility] clamps and the underlying rectus muscle dissected off both bluntly and sharply with curved faust scissors. Moderate adhesive disease was present.  The rectus muscles were  in the midline and the peritoneum was tented  upward then entered bluntly then extended laterally. The uterus was again noted to be adhered anteriorly to the bladder as well as the anterior abdominal wall.    O'Arias-O'Karan retractor was placed and the bowel was packed out of the operating field with warm, moist laps. The necrotic ovary was identified and elevated out of the pelvis and de-torsed. See media for picture. The uterus was palpable significantly inferior to the IP. The Ligasure was used to transect the infundibulopelvic ligament. The RSO performed without difficulty. Specimens sent to pathology. Excellent hemostasis was noted at the IP pedicle. Pelvis and abdomen were irrigated.The fascia was closed with #1 PDS in running sutures. The subcutaneous layer was closed with 2-plain gut. The skin incisions were closed using 4.0 monocryl suture, and steri-strips and a Island dressing were placed. All needle, lap, and instrument counts were correct x 2. The patient tolerated the procedure well. She was taken to the recovery room in stable condition.     Paulette Toribio MD PGY-2  Obstetrics and Gynecology

## 2022-06-18 NOTE — ANESTHESIA PREPROCEDURE EVALUATION
06/18/2022  Ofelia Ogden is a 36 y.o., female.      Pre-op Assessment    I have reviewed the Patient Summary Reports.     I have reviewed the Nursing Notes. I have reviewed the NPO Status.   I have reviewed the Medications.     Review of Systems  Anesthesia Hx:  No problems with previous Anesthesia  Denies Family Hx of Anesthesia complications.   Denies Personal Hx of Anesthesia complications.   Social:  Former Smoker, Social Alcohol Use    Hematology/Oncology:  Hematology Normal   Oncology Normal     EENT/Dental:EENT/Dental Normal   Cardiovascular:  Cardiovascular Normal Exercise tolerance: good     Pulmonary:  Pulmonary Normal    Renal/:   Hypokalemia (K-3.0)  Hx of Renal Stones   Hepatic/GI:  Hepatic/GI Normal    Musculoskeletal:  Musculoskeletal Normal    Neurological:   Headaches    Endocrine:  Obesity / BMI > 30  Psych:   depression          Physical Exam  General: Well nourished and Cooperative    Airway:  Mallampati: II   Mouth Opening: Normal  TM Distance: Normal  Neck ROM: Normal ROM    Dental:  Intact        Anesthesia Plan  Type of Anesthesia, risks & benefits discussed:    Anesthesia Type: Gen ETT  Intra-op Monitoring Plan: Standard ASA Monitors  Post Op Pain Control Plan: multimodal analgesia  Induction:  IV  Airway Plan: Video  Informed Consent: Informed consent signed with the Patient and all parties understand the risks and agree with anesthesia plan.  All questions answered.   ASA Score: 2 Emergent  Day of Surgery Review of History & Physical: H&P Update referred to the surgeon/provider.    Ready For Surgery From Anesthesia Perspective.     .

## 2022-06-18 NOTE — PLAN OF CARE
06/18/22 1534   Discharge Assessment   Assessment Type Discharge Planning Assessment   Confirmed/corrected address, phone number and insurance Yes   Confirmed Demographics Correct on Facesheet   Source of Information patient   Does patient/caregiver understand observation status Yes   Communicated KAREEM with patient/caregiver Yes;Date not available/Unable to determine   Lives With grandparent(s)   Do you expect to return to your current living situation? Yes   Prior to hospitilization cognitive status: Alert/Oriented   Current cognitive status: Alert/Oriented   Walking or Climbing Stairs Difficulty none   Dressing/Bathing Difficulty none   Equipment Currently Used at Home none   Readmission within 30 days? No   Patient currently being followed by outpatient case management? No   Do you currently have service(s) that help you manage your care at home? No   Do you take prescription medications? Yes   Who is going to help you get home at discharge? Father will pick her up.   Are you on dialysis? No   Do you take coumadin? No   Discharge Plan A Home with family   Discharge Barriers Identified None

## 2022-06-18 NOTE — TRANSFER OF CARE
"Anesthesia Transfer of Care Note    Patient: Ofelia Ogden    Procedure(s) Performed: Procedure(s) (LRB):  LAPAROSCOPY, DIAGNOSTIC (N/A)  SALPINGO-OOPHORECTOMY (Right)  LYSIS, ADHESIONS, LAPAROSCOPIC  LAPAROTOMY, EXPLORATORY    Patient location: PACU    Anesthesia Type: general    Transport from OR: Transported from OR on 2-3 L/min O2 by NC with adequate spontaneous ventilation    Post pain: adequate analgesia    Post assessment: no apparent anesthetic complications    Post vital signs: stable    Level of consciousness: awake and alert    Nausea/Vomiting: no nausea/vomiting    Complications: none    Transfer of care protocol was followed      Last vitals:   Visit Vitals  BP (!) 98/54 (BP Location: Left arm, Patient Position: Lying)   Pulse 73   Temp 36.8 °C (98.3 °F) (Oral)   Resp 18   Ht 5' 7" (1.702 m)   Wt 94.2 kg (207 lb 10.8 oz)   SpO2 100%   Breastfeeding No   BMI 32.53 kg/m²     "

## 2022-06-18 NOTE — ANESTHESIA POSTPROCEDURE EVALUATION
Anesthesia Post Evaluation    Patient: Ofelia Ogden    Procedure(s) Performed: Procedure(s) (LRB):  LAPAROSCOPY, DIAGNOSTIC (N/A)  SALPINGO-OOPHORECTOMY (Right)  LYSIS, ADHESIONS, LAPAROSCOPIC  LAPAROTOMY, EXPLORATORY (N/A)    Final Anesthesia Type: general      Patient location during evaluation: PACU  Patient participation: Yes- Able to Participate  Level of consciousness: awake and alert  Post-procedure vital signs: reviewed and stable  Pain management: adequate  Airway patency: patent    PONV status at discharge: No PONV  Anesthetic complications: no      Cardiovascular status: blood pressure returned to baseline  Respiratory status: spontaneous ventilation  Hydration status: euvolemic  Follow-up not needed.          Vitals Value Taken Time   /63 06/18/22 0602   Temp 36.8 °C (98.2 °F) 06/18/22 0540   Pulse 82 06/18/22 0615   Resp 16 06/18/22 0600   SpO2 94 % 06/18/22 0615   Vitals shown include unvalidated device data.      No case tracking events are documented in the log.      Pain/Florentin Score: Pain Rating Prior to Med Admin: 10 (6/17/2022 11:37 PM)  Florentin Score: 8 (6/18/2022  5:40 AM)

## 2022-06-18 NOTE — H&P
[facility]   Obstetrics & Gynecology  History & Physical    Patient Name: Ofelia Ogden  MRN: [id number]  Admission Date: 2022  Primary Care Provider: Primary Doctor No    Subjective:     Chief Complaint/Reason for Admission: Abdominal pain    History of Present Illness:   Ofelia Ogden is a 36 y.o.  presents complaining of abdominal pain. Patient reports significant abdominal pain that began on Monday and has steadily worsened and is now very severe. She has attempted OTC meds without relief. She endorses persistent nausea/vomiting and has had limited PO intake. She denies fevers/chills. She is amenorrheic on depo and denies abnormal vaginal discharge. Denies dysuria. Reports multiple watery BM after taking mag citrate with no relief in her pain. She has a history of  x2, LTCS x1.  She has no other prior surgical history.    No current facility-administered medications on file prior to encounter.     Current Outpatient Medications on File Prior to Encounter   Medication Sig    acetaminophen (TYLENOL) 500 MG tablet Take 1 tablet (500 mg total) by mouth every 6 (six) hours as needed for Pain.    etodolac (LODINE) 400 MG tablet Take 1 tablet (400 mg total) by mouth 2 (two) times daily as needed (Pain). Take with food    ibuprofen (ADVIL,MOTRIN) 800 MG tablet Take 1 tablet (800 mg total) by mouth every 6 (six) hours as needed for Pain.    ondansetron (ZOFRAN) 4 MG tablet Take 1 tablet (4 mg total) by mouth every 6 (six) hours as needed for Nausea.    ondansetron (ZOFRAN-ODT) 4 MG TbDL Take 1 tablet (4 mg total) by mouth every 6 (six) hours as needed.       Review of patient's allergies indicates:  No Known Allergies    Past Medical History:   Diagnosis Date    Depression      OB History   No obstetric history on file.     No past surgical history on file.  Family History    None       Tobacco Use    Smoking status: Never Smoker    Smokeless tobacco: Not on file   Substance and Sexual Activity     Alcohol use: Yes     Comment: occassional    Drug use: No    Sexual activity: Not on file     Review of Systems   Constitutional: Negative for chills and fatigue.   Eyes: Negative for visual disturbance.   Respiratory: Negative for cough and shortness of breath.    Cardiovascular: Negative for chest pain.   Gastrointestinal: Positive for abdominal pain, nausea and vomiting.   Endocrine: Negative for diabetes.   Genitourinary: Negative for dysuria, vaginal bleeding and vaginal discharge.   Musculoskeletal: Negative for back pain.   Neurological: Negative for headaches.     Objective:     Vital Signs (Most Recent):  Temp: 98.3 °F (36.8 °C) (06/18/22 0028)  Pulse: 73 (06/18/22 0028)  Resp: 18 (06/18/22 0028)  BP: (!) 98/54 (06/18/22 0028)  SpO2: 100 % (06/18/22 0028) Vital Signs (24h Range):  Temp:  [98.3 °F (36.8 °C)] 98.3 °F (36.8 °C)  Pulse:  [] 73  Resp:  [16-20] 18  SpO2:  [92 %-100 %] 100 %  BP: ()/(48-66) 98/54     Weight: 94.2 kg (207 lb 10.8 oz)  Body mass index is 32.53 kg/m².  No LMP recorded.    Physical Exam:   Constitutional: She is oriented to person, place, and time. She appears well-developed and well-nourished.       Cardiovascular: Normal rate.     Pulmonary/Chest: Effort normal.        Abdominal: She exhibits no mass. There is abdominal tenderness (diffuse). There is guarding (voluntary). There is no rebound.     Genitourinary:    Genitourinary Comments: Significant discomfort with pelvic exam. Patient was exquisitely tender with palpation of fornix, concerning for peritonitis. Generalized tenderness with palpation of uterus and adnexa.             Musculoskeletal: Moves all extremeties.       Neurological: She is alert and oriented to person, place, and time.    Skin: Skin is warm and dry.    Psychiatric: She has a normal mood and affect. Her behavior is normal.       Laboratory:  CBC:   Recent Labs   Lab 06/17/22  1850   WBC 8.02   RBC 3.98*   HGB 12.7   HCT 38.3      MCV  96   MCH 31.9*   MCHC 33.2     CMP:   Recent Labs   Lab 06/17/22  2033   GLU 81   CALCIUM 9.4   ALBUMIN 3.8   PROT 8.0      K 3.0*   CO2 19*      BUN 11   CREATININE 0.8   ALKPHOS 59   ALT 13   AST 10   BILITOT 0.7     Urine Pregnancy Test: negative    Diagnostic Results:  CT ab/pelvis on 6/18/22:  CT ABDOMEN PELVIS WITHOUT CONTRAST     CLINICAL HISTORY:  Nausea/vomiting;Abdominal pain, acute, nonlocalized;     TECHNIQUE:  Low dose axial images, sagittal and coronal reformations were obtained from the lung bases to the pubic symphysis.  Oral contrast was not administered.     COMPARISON:  None     FINDINGS:  The visualized portion of the heart is unremarkable.  The lung bases are clear.     Small inferior right hepatic lobe hypodensity, probable cyst is seen.  There is no intra-or extrahepatic biliary ductal dilatation.  Gallbladder is contracted.  The stomach, pancreas, spleen, and adrenal glands are unremarkable.     Kidneys show no evidence of stones or hydronephrosis. Ureters are difficult to track distally, however no stones are seen along their expected courses.  Urinary bladder is nondistended.  Uterus is unremarkable.  Large heterogenous right adnexal/ovarian mass is seen measuring at least 9 x 8 cm.  Small 2 cm fat density component is seen within this lesion.     Appendix is visualized and is unremarkable.  The visualized loops of small and large bowel show no evidence of obstruction or inflammation.  No free air or free fluid.     Aorta tapers normally.     No acute osseous abnormality identified. Subcutaneous soft tissue structures are unremarkable.     Impression:     1. Large 9 x 8 cm heterogenous right adnexal/ovarian mass lesion.  Small 2 cm internal fat density component is seen which may be seen with a dermoid tumor, however other potential neoplastic process or malignancy is not excluded.  Lesion size may predispose for potential ovarian torsion.  Ob gyn referral and future follow-up  are advised.  2. No additional acute intra-abdominal abnormalities identified.      TVUS:  US PELVIS COMP WITH TRANSVAG NON-OB (XPD)     CLINICAL HISTORY:  pelvic mass;     TECHNIQUE:  Transabdominal sonography of the pelvis was performed, followed by transvaginal sonography to better evaluate the uterus and ovaries.     COMPARISON:  Correlation with CT 06/17/2022     FINDINGS:  Uterus:     Size: 8.5 x 5.1 x 5.6 cm     Masses: None     Endometrium: Normal in this pre menopausal patient, measuring 9 mm.     Right ovary:     Size: 6.8 x 5.0 x 6.9 cm     Appearance: Right ovary is enlarged with multiple minimally complex cystic lesions including a 4.4 cm and 4.2 cm lesion.  There is a 2.3 cm predominately echogenic component with shadowing which corresponds to the fat density component on CT.  I favor an ovarian dermoid.     Vascular flow: Normal.     Left ovary:     Size: 3.2 x 2.0 x 2.8 cm     Appearance: Normal     Vascular Flow: Normal.     Free Fluid:     None.     Impression:     Right adnexal mass presumed to represent an enlarged right ovary with probable associated right ovarian dermoid.  There are complex cystic, fatty and soft tissue attenuation components noted.  Recommend gyn follow-up and clinical correlation.    Assessment/Plan:     Active Diagnoses:    Diagnosis Date Noted POA    PRINCIPAL PROBLEM:  Cyst of right ovary [N83.201] 06/18/2022 Yes    Lower abdominal pain [R10.30] 06/18/2022 Yes      Problems Resolved During this Admission:     1. Abdominal pain  - VSS, afebrile  - No leukocytosis. CMP wnl  - TVUS and CT consistent with 9cm ovarian complex cyst  - s/p 8mg morphine in the ED as well as 30mg toradol without improvement in pain   - Clinical picture and imaging findings concerning for ovarian torsion. Admitted to observation and serial abdominal exams. However, on repeat exam s/p toradol, patient's pain had not improved at all and still a 10/10. Discussed with staff- Due to significant concern  for ovarian torsion, recommend proceeding with emergent diagnostic laparoscopy. Discussed r/b/a of surgery including bleeding, infection, damage to internal structures, and possible removal of ovary. Patient desires to proceed with surgery. Consents signed. House sup notified.   - Plan to proceed with diagnostic laparoscopy with cystectomy vs oopherectomy for suspected ovarian torsion  - NPO, IVF  - Case request placed    Paulette Toribio MD  Obstetrics & Gynecology  [facility] - Emergency Dept

## 2022-06-18 NOTE — ANESTHESIA PROCEDURE NOTES
Intubation    Date/Time: 6/18/2022 2:11 AM  Performed by: Asya Cruz CRNA  Authorized by: Asya Cruz CRNA     Intubation:     Induction:  Rapid sequence induction    Intubated:  Postinduction    Attempts:  1    Attempted By:  CRNA    Method of Intubation:  Video laryngoscopy    Blade:  Saab 3    Laryngeal View Grade: Grade I - full view of cords      Difficult Airway Encountered?: No      Complications:  None    Airway Device:  Oral endotracheal tube    Airway Device Size:  7.5    Tube secured:  22    Secured at:  The lips    Placement Verified By:  Capnometry    Complicating Factors:  None    Findings Post-Intubation:  BS equal bilateral and atraumatic/condition of teeth unchanged

## 2022-06-18 NOTE — BRIEF OP NOTE
[facility]  Brief Op Note    Admit Date: 6/17/2022    Attending Physician: Mariposa Kruger MD     Surgery Date: 6/18/2022     Surgeon(s) and Role:     * Mariposa Kruger MD - Primary    Assisting Surgeon: Paulette Toribio MD PGY2    Pre-op Diagnosis:  Cyst of right ovary [N83.201]    Post-op Diagnosis:  Post-Op Diagnosis Codes:     * Cyst of right ovary [N83.201]    Procedure(s) (LRB):  LAPAROSCOPY, DIAGNOSTIC (N/A)  SALPINGO-OOPHORECTOMY (Right)  LYSIS, ADHESIONS, LAPAROSCOPIC  LAPAROTOMY, EXPLORATORY (N/A)    Anesthesia: General    Findings/Key Components:   Normal external genitalia. Cervix does not pull. Abdomen initially entered laparoscopically and significant pelvic adhesive disease was noted. The right ovary appeared grossly necrotic and very clearly torsed multiple times. There was evidence of cyst rupture with free fluid in the pelvis.  Lysis of adhesions performed. There was extremely poor visualization of pelvic structures due to uterine adhesions to the anterior abdominal wall and bladder. The right ovary was unable to be manipulated out of the posterior cul-de-sac and de-torsed in a laparoscopic manner. Decision was then made to proceed via exploratory laparotomy via Pfannenstiel incision  Abdomen entered through previous Pfannenstiel incision. The necrotic ovary was identified and elevated out of the pelvis and de-torsed. An RSO performed without difficulty. Specimens sent to pathology. Pelvis and abdomen were irrigated. The abdominal layers were closed in the usual manner.Excellent hemostasis noted prior to abdominal closure.      Estimated Blood Loss: 250 mL         Specimens:   Specimen (24h ago, onward)             Start     Ordered    06/18/22 0416  Specimen to Pathology, Surgery Gynecology and Obstetrics  Once        Comments: Pre-op Diagnosis: Cyst of right ovary [N83.201]Procedure(s):LAPAROSCOPY, DIAGNOSTICEXCISION, CYST, OVARY, LAPAROSCOPIC Number of specimens: 1Name of  specimens: 1. Right Ovarian Cyst, Tube & Ovary     References:    Click here for ordering Quick Tip   Question Answer Comment   Procedure Type: Gynecology and Obstetrics    Specimen Class: Routine/Screening    Which provider would you like to cc? CALVIN PETERSON    Release to patient Immediate        06/18/22 0415                Diagnoses:  Active Hospital Problems    Diagnosis  POA    *Cyst of right ovary [N83.201]  Yes    Lower abdominal pain [R10.30]  Yes      Resolved Hospital Problems   No resolved problems to display.       Paulette Toribio MD PGY-2  Obstetrics and Gynecology

## 2022-06-19 LAB
BASOPHILS # BLD AUTO: 0.02 K/UL (ref 0–0.2)
BASOPHILS NFR BLD: 0.2 % (ref 0–1.9)
DIFFERENTIAL METHOD: ABNORMAL
EOSINOPHIL # BLD AUTO: 0.1 K/UL (ref 0–0.5)
EOSINOPHIL NFR BLD: 0.9 % (ref 0–8)
ERYTHROCYTE [DISTWIDTH] IN BLOOD BY AUTOMATED COUNT: 13 % (ref 11.5–14.5)
HCT VFR BLD AUTO: 31.4 % (ref 37–48.5)
HGB BLD-MCNC: 10.7 G/DL (ref 12–16)
IMM GRANULOCYTES # BLD AUTO: 0.06 K/UL (ref 0–0.04)
IMM GRANULOCYTES NFR BLD AUTO: 0.7 % (ref 0–0.5)
LYMPHOCYTES # BLD AUTO: 1.4 K/UL (ref 1–4.8)
LYMPHOCYTES NFR BLD: 16.5 % (ref 18–48)
MCH RBC QN AUTO: 32.7 PG (ref 27–31)
MCHC RBC AUTO-ENTMCNC: 34.1 G/DL (ref 32–36)
MCV RBC AUTO: 96 FL (ref 82–98)
MONOCYTES # BLD AUTO: 0.8 K/UL (ref 0.3–1)
MONOCYTES NFR BLD: 9.1 % (ref 4–15)
NEUTROPHILS # BLD AUTO: 6.2 K/UL (ref 1.8–7.7)
NEUTROPHILS NFR BLD: 72.6 % (ref 38–73)
NRBC BLD-RTO: 0 /100 WBC
PLATELET # BLD AUTO: 283 K/UL (ref 150–450)
PLATELET BLD QL SMEAR: ABNORMAL
PMV BLD AUTO: 10.4 FL (ref 9.2–12.9)
RBC # BLD AUTO: 3.27 M/UL (ref 4–5.4)
WBC # BLD AUTO: 8.6 K/UL (ref 3.9–12.7)

## 2022-06-19 PROCEDURE — 96361 HYDRATE IV INFUSION ADD-ON: CPT

## 2022-06-19 PROCEDURE — G0378 HOSPITAL OBSERVATION PER HR: HCPCS

## 2022-06-19 PROCEDURE — 25000003 PHARM REV CODE 250: Performed by: STUDENT IN AN ORGANIZED HEALTH CARE EDUCATION/TRAINING PROGRAM

## 2022-06-19 PROCEDURE — 85025 COMPLETE CBC W/AUTO DIFF WBC: CPT | Performed by: STUDENT IN AN ORGANIZED HEALTH CARE EDUCATION/TRAINING PROGRAM

## 2022-06-19 PROCEDURE — 25000003 PHARM REV CODE 250

## 2022-06-19 PROCEDURE — 63600175 PHARM REV CODE 636 W HCPCS: Performed by: STUDENT IN AN ORGANIZED HEALTH CARE EDUCATION/TRAINING PROGRAM

## 2022-06-19 PROCEDURE — 99024 PR POST-OP FOLLOW-UP VISIT: ICD-10-PCS | Mod: ,,, | Performed by: OBSTETRICS & GYNECOLOGY

## 2022-06-19 PROCEDURE — 36415 COLL VENOUS BLD VENIPUNCTURE: CPT | Performed by: STUDENT IN AN ORGANIZED HEALTH CARE EDUCATION/TRAINING PROGRAM

## 2022-06-19 PROCEDURE — 99024 POSTOP FOLLOW-UP VISIT: CPT | Mod: ,,, | Performed by: OBSTETRICS & GYNECOLOGY

## 2022-06-19 PROCEDURE — 96376 TX/PRO/DX INJ SAME DRUG ADON: CPT

## 2022-06-19 PROCEDURE — 96375 TX/PRO/DX INJ NEW DRUG ADDON: CPT

## 2022-06-19 RX ORDER — OXYCODONE HYDROCHLORIDE 5 MG/1
5 TABLET ORAL EVERY 4 HOURS PRN
Status: DISCONTINUED | OUTPATIENT
Start: 2022-06-19 | End: 2022-06-20 | Stop reason: HOSPADM

## 2022-06-19 RX ORDER — ACETAMINOPHEN 325 MG/1
650 TABLET ORAL EVERY 6 HOURS
Status: DISCONTINUED | OUTPATIENT
Start: 2022-06-19 | End: 2022-06-20 | Stop reason: HOSPADM

## 2022-06-19 RX ORDER — OXYCODONE HYDROCHLORIDE 5 MG/1
10 TABLET ORAL EVERY 4 HOURS PRN
Status: DISCONTINUED | OUTPATIENT
Start: 2022-06-19 | End: 2022-06-20 | Stop reason: HOSPADM

## 2022-06-19 RX ORDER — ACETAMINOPHEN 500 MG
1000 TABLET ORAL EVERY 6 HOURS
Status: DISCONTINUED | OUTPATIENT
Start: 2022-06-19 | End: 2022-06-19

## 2022-06-19 RX ORDER — POTASSIUM CHLORIDE 20 MEQ/1
20 TABLET, EXTENDED RELEASE ORAL EVERY 4 HOURS
Status: COMPLETED | OUTPATIENT
Start: 2022-06-19 | End: 2022-06-19

## 2022-06-19 RX ADMIN — SODIUM CHLORIDE, SODIUM LACTATE, POTASSIUM CHLORIDE, AND CALCIUM CHLORIDE: .6; .31; .03; .02 INJECTION, SOLUTION INTRAVENOUS at 12:06

## 2022-06-19 RX ADMIN — POTASSIUM CHLORIDE 20 MEQ: 1500 TABLET, EXTENDED RELEASE ORAL at 08:06

## 2022-06-19 RX ADMIN — IBUPROFEN 600 MG: 600 TABLET ORAL at 11:06

## 2022-06-19 RX ADMIN — IBUPROFEN 600 MG: 600 TABLET ORAL at 06:06

## 2022-06-19 RX ADMIN — OXYCODONE AND ACETAMINOPHEN 1 TABLET: 10; 325 TABLET ORAL at 04:06

## 2022-06-19 RX ADMIN — ACETAMINOPHEN 650 MG: 325 TABLET, FILM COATED ORAL at 06:06

## 2022-06-19 RX ADMIN — POTASSIUM CHLORIDE 20 MEQ: 1500 TABLET, EXTENDED RELEASE ORAL at 06:06

## 2022-06-19 RX ADMIN — OXYCODONE AND ACETAMINOPHEN 1 TABLET: 10; 325 TABLET ORAL at 08:06

## 2022-06-19 RX ADMIN — ACETAMINOPHEN 650 MG: 325 TABLET, FILM COATED ORAL at 11:06

## 2022-06-19 RX ADMIN — DIPHENHYDRAMINE HYDROCHLORIDE 25 MG: 50 INJECTION, SOLUTION INTRAMUSCULAR; INTRAVENOUS at 07:06

## 2022-06-19 RX ADMIN — DIPHENHYDRAMINE HYDROCHLORIDE 25 MG: 25 CAPSULE ORAL at 12:06

## 2022-06-19 RX ADMIN — POTASSIUM CHLORIDE 20 MEQ: 1500 TABLET, EXTENDED RELEASE ORAL at 02:06

## 2022-06-19 RX ADMIN — MUPIROCIN: 20 OINTMENT TOPICAL at 08:06

## 2022-06-19 RX ADMIN — HYDROMORPHONE HYDROCHLORIDE 1 MG: 1 INJECTION, SOLUTION INTRAMUSCULAR; INTRAVENOUS; SUBCUTANEOUS at 12:06

## 2022-06-19 RX ADMIN — ACETAMINOPHEN 650 MG: 325 TABLET, FILM COATED ORAL at 12:06

## 2022-06-19 RX ADMIN — DIPHENHYDRAMINE HYDROCHLORIDE 25 MG: 50 INJECTION, SOLUTION INTRAMUSCULAR; INTRAVENOUS at 12:06

## 2022-06-19 RX ADMIN — MUPIROCIN: 20 OINTMENT TOPICAL at 09:06

## 2022-06-19 NOTE — PLAN OF CARE
Problem: Adult Inpatient Plan of Care  Goal: Plan of Care Review  Outcome: Ongoing, Progressing  Goal: Patient-Specific Goal (Individualized)  Outcome: Ongoing, Progressing  Goal: Absence of Hospital-Acquired Illness or Injury  Outcome: Ongoing, Progressing  Goal: Optimal Comfort and Wellbeing  Outcome: Ongoing, Progressing  Goal: Readiness for Transition of Care  Outcome: Ongoing, Progressing     Problem: Pain Acute  Goal: Acceptable Pain Control and Functional Ability  Outcome: Ongoing, Progressing     Problem: Fall Injury Risk  Goal: Absence of Fall and Fall-Related Injury  Outcome: Ongoing, Progressing     Problem: Impaired Wound Healing  Goal: Optimal Wound Healing  Outcome: Ongoing, Progressing     Problem: Infection  Goal: Absence of Infection Signs and Symptoms  Outcome: Ongoing, Progressing     Problem: Fluid and Electrolyte Imbalance (Surgery Nonspecified)  Goal: Fluid and Electrolyte Balance  Outcome: Ongoing, Progressing     Problem: Postoperative Urinary Retention (Surgery Nonspecified)  Goal: Effective Urinary Elimination  Outcome: Ongoing, Progressing     Problem: Bowel Motility Impaired (Surgery Nonspecified)  Goal: Effective Bowel Elimination  Outcome: Ongoing, Progressing     Problem: Postoperative Nausea and Vomiting (Surgery Nonspecified)  Goal: Nausea and Vomiting Relief  Outcome: Ongoing, Progressing

## 2022-06-19 NOTE — PROGRESS NOTES
Progress Note  Gynecology    Admit Date: 6/17/2022  LOS: 0    Reason for Admission:  Cyst of right ovary    SUBJECTIVE:     Ofelia Ogden is a 36 y.o. No obstetric history on file. who is POD#1 s/p dx lap > ex-lap, RSO, TORI for the treatment of ovarian necrosis secondary to torsion of 9cm ovarian cyst. Surgery was complicated by poor visualization due to adhesions, requiring conversion to ex-lap. Post operative course has been uncomplicated. EBL 250cc.      Pt doing okay this morning. Patient requested IV Dilaudid 1mg overnight. N oxycodone overnight. Receiving scheduled ibuprofen. Reports 10/10 pain. She is tolerating a regular diet without N/V. Ambulating without difficulty. Voiding without difficulty via marie. She is not yet having bowel movements or passing flatus. Not OOB.     OBJECTIVE:     Vital Signs   Temp:  [97.1 °F (36.2 °C)-98.2 °F (36.8 °C)] 97.7 °F (36.5 °C)  Pulse:  [60-94] 72  Resp:  [12-20] 18  SpO2:  [92 %-100 %] 100 %  BP: (108-125)/(54-71) 120/54      Intake/Output Summary (Last 24 hours) at 6/19/2022 0246  Last data filed at 6/19/2022 0025  Gross per 24 hour   Intake 7786.76 ml   Output 1165 ml   Net 6621.76 ml       Physical Exam:  Gen: A&Ox3, NAD  Pulm: normal respiratory effort  Abd:  non-distended, non-tender to palpation without rebound or guarding  Inc: port sites x3 covered with steri strips, Pfannenstiel skin incision c/d/i covered with ABD dressing, minimal shadowing   Ext: PPP, no peripheral edema, TEDs/SCDs in place  : Marie in place draining clear yellow urine       Laboratory:  No results found for this or any previous visit (from the past 24 hour(s)).    Diagnostic Results:      ASSESSMENT/PLAN:     Active Hospital Problems    Diagnosis  POA    *Cyst of right ovary [N83.201]  Yes    Lower abdominal pain [R10.30]  Yes      Resolved Hospital Problems   No resolved problems to display.       Assessment: Ofelia Ogden is a 36 y.o. No obstetric history on file. who is  POD#1 s/p dx lap > ex-lap, RSO, TORI for the treatment of ovarian necrosis secondary to torsion of 9cm ovarian cyst. Surgery was complicated by poor visualization due to adhesions, requiring conversion to ex-lap. Post operative course has been uncomplicated.  cc.     Plan:   1. Post-op   - Overnight, VSS.   - Pain well controlled via scheduled ibuprofen/acetaminophen and oxy PRN. D/c Dilaudid for BTP. Explained to patient and nursing to attempt PO pain control.   - UOP adequate (1400 cc overnight). Voiding via Garcia catheter.   - D/C Garcia and perform spontaneous voiding trial.   - CBC pending. Pre H/H 12/38.   - Is tolerating PO. General diet. Antiemetics prn nausea/vomiting.  - Patient has not had a BM. Is not passing gas. Simethicone and Miralax PRN scheduled.   - Patient is not ambulating independently. Encourage ambulation.     2. Depression    - No meds     Dispo: As patient meets appropriate post-op milestones, plan for discharge to home.    Marielena Montalvo MD  PGY 1  Obstetrics and Gynecology

## 2022-06-20 VITALS
BODY MASS INDEX: 32.6 KG/M2 | TEMPERATURE: 97 F | SYSTOLIC BLOOD PRESSURE: 123 MMHG | HEIGHT: 67 IN | WEIGHT: 207.69 LBS | HEART RATE: 73 BPM | RESPIRATION RATE: 20 BRPM | DIASTOLIC BLOOD PRESSURE: 78 MMHG | OXYGEN SATURATION: 100 %

## 2022-06-20 PROBLEM — Z98.890 STATUS POST EXPLORATORY LAPAROTOMY: Status: ACTIVE | Noted: 2022-06-20

## 2022-06-20 LAB
ANION GAP SERPL CALC-SCNC: 9 MMOL/L (ref 8–16)
BUN SERPL-MCNC: 9 MG/DL (ref 6–20)
CALCIUM SERPL-MCNC: 8.4 MG/DL (ref 8.7–10.5)
CHLORIDE SERPL-SCNC: 112 MMOL/L (ref 95–110)
CO2 SERPL-SCNC: 21 MMOL/L (ref 23–29)
CREAT SERPL-MCNC: 0.7 MG/DL (ref 0.5–1.4)
EST. GFR  (AFRICAN AMERICAN): >60 ML/MIN/1.73 M^2
EST. GFR  (NON AFRICAN AMERICAN): >60 ML/MIN/1.73 M^2
GLUCOSE SERPL-MCNC: 95 MG/DL (ref 70–110)
POTASSIUM SERPL-SCNC: 3.5 MMOL/L (ref 3.5–5.1)
SODIUM SERPL-SCNC: 142 MMOL/L (ref 136–145)

## 2022-06-20 PROCEDURE — 96376 TX/PRO/DX INJ SAME DRUG ADON: CPT

## 2022-06-20 PROCEDURE — 25000003 PHARM REV CODE 250: Performed by: STUDENT IN AN ORGANIZED HEALTH CARE EDUCATION/TRAINING PROGRAM

## 2022-06-20 PROCEDURE — 36415 COLL VENOUS BLD VENIPUNCTURE: CPT | Performed by: STUDENT IN AN ORGANIZED HEALTH CARE EDUCATION/TRAINING PROGRAM

## 2022-06-20 PROCEDURE — 80048 BASIC METABOLIC PNL TOTAL CA: CPT | Performed by: STUDENT IN AN ORGANIZED HEALTH CARE EDUCATION/TRAINING PROGRAM

## 2022-06-20 PROCEDURE — G0378 HOSPITAL OBSERVATION PER HR: HCPCS

## 2022-06-20 PROCEDURE — 25000003 PHARM REV CODE 250

## 2022-06-20 PROCEDURE — 63600175 PHARM REV CODE 636 W HCPCS: Performed by: STUDENT IN AN ORGANIZED HEALTH CARE EDUCATION/TRAINING PROGRAM

## 2022-06-20 RX ORDER — ACETAMINOPHEN 500 MG
1000 TABLET ORAL EVERY 8 HOURS PRN
Qty: 30 TABLET | Refills: 0 | Status: SHIPPED | OUTPATIENT
Start: 2022-06-20 | End: 2022-06-25

## 2022-06-20 RX ORDER — OXYCODONE HYDROCHLORIDE 5 MG/1
5 TABLET ORAL EVERY 4 HOURS PRN
Qty: 25 TABLET | Refills: 0 | Status: SHIPPED | OUTPATIENT
Start: 2022-06-20 | End: 2022-06-25 | Stop reason: SDUPTHER

## 2022-06-20 RX ORDER — AMOXICILLIN 250 MG
1 CAPSULE ORAL 2 TIMES DAILY
Qty: 60 TABLET | Refills: 1 | Status: SHIPPED | OUTPATIENT
Start: 2022-06-20

## 2022-06-20 RX ORDER — BENZONATATE 100 MG/1
100 CAPSULE ORAL 3 TIMES DAILY PRN
Status: DISCONTINUED | OUTPATIENT
Start: 2022-06-20 | End: 2022-06-20 | Stop reason: HOSPADM

## 2022-06-20 RX ORDER — IBUPROFEN 800 MG/1
800 TABLET ORAL EVERY 8 HOURS PRN
Qty: 60 TABLET | Refills: 1 | Status: SHIPPED | OUTPATIENT
Start: 2022-06-20

## 2022-06-20 RX ADMIN — IBUPROFEN 600 MG: 600 TABLET ORAL at 05:06

## 2022-06-20 RX ADMIN — MUPIROCIN: 20 OINTMENT TOPICAL at 09:06

## 2022-06-20 RX ADMIN — IBUPROFEN 600 MG: 600 TABLET ORAL at 11:06

## 2022-06-20 RX ADMIN — DIPHENHYDRAMINE HYDROCHLORIDE 25 MG: 50 INJECTION, SOLUTION INTRAMUSCULAR; INTRAVENOUS at 05:06

## 2022-06-20 RX ADMIN — OXYCODONE 10 MG: 5 TABLET ORAL at 05:06

## 2022-06-20 RX ADMIN — ACETAMINOPHEN 650 MG: 325 TABLET, FILM COATED ORAL at 11:06

## 2022-06-20 NOTE — DISCHARGE SUMMARY
Discharge Summary  Gynecology      Admit Date: 6/17/2022    Discharge Date and Time: 6/20/2022     Attending Physician: Mariposa Kruger MD    Principal Diagnoses: Cyst of right ovary    Active Hospital Problems    Diagnosis  POA    *Cyst of right ovary [N83.201]  Yes    Status post exploratory laparotomy [Z98.890]  Not Applicable    Lower abdominal pain [R10.30]  Yes      Resolved Hospital Problems   No resolved problems to display.       Procedures: Procedure(s) (LRB):  LAPAROSCOPY, DIAGNOSTIC (N/A)  SALPINGO-OOPHORECTOMY (Right)  LYSIS, ADHESIONS, LAPAROSCOPIC  LAPAROTOMY, EXPLORATORY (N/A)    Discharged Condition: good    Hospital Course:   Ofelia Ogden is a 36 y.o. y.o. No obstetric history on file. female who presented on 6/17/2022   for above procedures for the treatment of R side ovarian torsion. Patient tolerated procedure. Post-operative course was uncomplicated.  On day of discharge, patient was urinating, ambulating, and tolerating a regular diet without difficulty. Pain was well controlled on PO medication. She was discharged home on POD#2 in stable condition with instructions to follow up with primary OBGYN.     Consults: None    Significant Diagnostic Studies:  Recent Labs   Lab 06/17/22  1850 06/19/22  0507   WBC 8.02 8.60   HGB 12.7 10.7*   HCT 38.3 31.4*   MCV 96 96    283        Treatments:   1. Surgery as above    Disposition: Home or Self Care    Patient Instructions:   Current Discharge Medication List      START taking these medications    Details   oxyCODONE (ROXICODONE) 5 MG immediate release tablet Take 1 tablet (5 mg total) by mouth every 4 (four) hours as needed for Pain.  Qty: 25 tablet, Refills: 0    Comments: Quantity prescribed more than 7 day supply? No      senna-docusate 8.6-50 mg (PERICOLACE) 8.6-50 mg per tablet Take 1 tablet by mouth 2 (two) times a day.  Qty: 60 tablet, Refills: 1         CONTINUE these medications which have CHANGED    Details    acetaminophen (TYLENOL) 500 MG tablet Take 2 tablets (1,000 mg total) by mouth every 8 (eight) hours as needed for Pain (Alternate with ibuprofen).  Qty: 30 tablet, Refills: 0      ibuprofen (ADVIL,MOTRIN) 800 MG tablet Take 1 tablet (800 mg total) by mouth every 8 (eight) hours as needed for Pain (Alternate with acetaminophen).  Qty: 60 tablet, Refills: 1         CONTINUE these medications which have NOT CHANGED    Details   etodolac (LODINE) 400 MG tablet Take 1 tablet (400 mg total) by mouth 2 (two) times daily as needed (Pain). Take with food  Qty: 20 tablet, Refills: 0      ondansetron (ZOFRAN) 4 MG tablet Take 1 tablet (4 mg total) by mouth every 6 (six) hours as needed for Nausea.  Qty: 12 tablet, Refills: 0      ondansetron (ZOFRAN-ODT) 4 MG TbDL Take 1 tablet (4 mg total) by mouth every 6 (six) hours as needed.  Qty: 20 tablet, Refills: 0             Discharge Procedure Orders   Diet general     Lifting restrictions   Order Comments: No lifting greater than 15 pounds for six weeks.     Other restrictions (specify):   Order Comments: DRIVING:  No driving while on narcotics. Driving may be resumed initially with a competent passenger one to two weeks after surgery if no longer taking narcotics.     EXERCISE:  For six weeks your exercise should be limited to walking. You may walk as far as you wish, as long as you increase your level of exertion gradually and avoid slippery surfaces. You may climb stairs as needed to get around, but should not use stair climbing for exercise.     Remove dressing in 24 hours   Order Comments: If you had steri-strips remove them once they begin to peel off (usually 2 weeks). Keep incision clean and dry.  Inspect the incision daily for signs and symptoms of infection.     Wound care routine (specify)   Order Comments: WOUND CARE:  If you have a band-aid or bandage on your wound, you may remove it the day after dismissal.  You may wash the wound with mild soap and water.   You  may shower at any time but should avoid immersing any abdominal incisions in water for at least two weeks after surgery or until the wound is completely healed. If given, please shower with Hibiclens soap until bottle is completely finished. Keep your wound clean and dry.  You should observe your incision for signs of infection which include redness, warmth, drainage or fever.     Call MD for:  temperature >100.4     Call MD for:  persistent nausea and vomiting     Call MD for:  severe uncontrolled pain     Call MD for:  difficulty breathing, headache or visual disturbances     Call MD for:  redness, tenderness, or signs of infection (pain, swelling, redness, odor or green/yellow discharge around incision site)     Call MD for:  hives     Call MD for:   Order Comments: Inability to void,urine is ketchup colored or you have large clots, vaginal bleeding is heavier than a period.    CONSTIPATION REMEDIES: Patients are often constipated after surgery or with use of oral narcotic medicine. You should continue to take the stool softener, Senokot-S during the next six weeks, and consume adequate amounts of water.  If you have not had a bowel movement for 3 days after dismissal, or are uncomfortable and unable to pass stool, please try one or all of the following measures:  1.  Milk of Magnesia - 30 cc by mouth every 12 hours   2.  Dulcolax suppository - One suppository per rectum every 4-6 hours   3.  Metamucil, Fibercon or other bulk former - use as directed  4.  Fleets Enema    PAIN MEDICATIONS:   Take your pain medications as instructed. It is best to take pain medications before your pain becomes severe. This will allow you to take less medication yet have better pain relief. For the first 2 or 3 days it may be helpful to take your pain medications on a regular schedule (e.g. every 4 to 6 hours). This will help you to keep your pain under better control. You should then begin to take fewer medications each day until  you no longer need them. Do not take pain medication on an empty stomach. This may lead to nausea and vomiting.     Call MD for:  persistent dizziness or light-headedness     Call MD for:  extreme fatigue     Activity as tolerated   Order Comments: PELVIC REST:  No douching, tampons, or intercourse for 6 weeks.    DRIVING:  No driving while on narcotics. Driving may be resumed initially with a competent passenger one to two weeks after surgery if no longer taking narcotics.     EXERCISE:  For six weeks your exercise should be limited to walking. You may walk as far as you wish, as long as you increase your level of exertion gradually and avoid slippery surfaces. You may climb stairs as needed to get around, but should not use stair climbing for exercise.     Shower on day dressing removed (No bath)   Order Comments: You may shower at any time but should avoid immersing any abdominal incisions in water for at least 2 weeks after surgery or until the wound is completely healed.  If given, please shower with Hibaclens soap until bottle is completely finished.        Follow-up Information     Your Primary Ob/Gyn. Schedule an appointment as soon as possible for a visit in 2 week(s).    Why: Post-operative Visit and Hospital Follow-up                     Rachana Laughlin MD  OBGYN PGY-1

## 2022-06-20 NOTE — PROGRESS NOTES
Progress Note  Gynecology    Admit Date: 6/17/2022  LOS: 0    Reason for Admission:  Cyst of right ovary    SUBJECTIVE:     Ofelia Ogden is a 36 y.o. No obstetric history on file. 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 Surgery was complicated by poor visualization due to adhesions, requiring conversion to ex-lap. Post operative course has been uncomplicated. EBL 250cc.      Pt doing okay this morning. Pt requested oxycodone this morning at around 0500 for pain. States she has been coughing all night and that her coughing makes her incision and abdomen hurt with 10/10 pain. States she has been having intermittent coughing episodes ever since she had COVID in 2021. Receiving scheduled ibuprofen/tylenol.     She is tolerating a regular diet without N/V. Ambulating without difficulty. Voiding without difficulty. She is not yet having bowel movements. She is passing flatus.     OBJECTIVE:     Vital Signs   Temp:  [97.5 °F (36.4 °C)-98.8 °F (37.1 °C)] 98.6 °F (37 °C)  Pulse:  [58-76] 58  Resp:  [16-20] 18  SpO2:  [98 %-100 %] 99 %  BP: (101-119)/(58-76) 107/68      Intake/Output Summary (Last 24 hours) at 6/20/2022 0620  Last data filed at 6/20/2022 0530  Gross per 24 hour   Intake 2306.72 ml   Output 1600 ml   Net 706.72 ml       Physical Exam:  Gen: A&Ox3, NAD  Pulm: normal respiratory effort, no wheezing, LCTAB  Abd:  non-distended, appropriately tender to palpation of RLQ without rebound or guarding  Inc: port sites x3 covered with steri strips, Pfannenstiel skin incision c/d/i covered with ABD dressing, minimal shadowing   Ext: PPP, no peripheral edema, TEDs/SCDs in place  : deferred      Laboratory:  Recent Results (from the past 24 hour(s))   Basic metabolic panel    Collection Time: 06/20/22  5:07 AM   Result Value Ref Range    Sodium 142 136 - 145 mmol/L    Potassium 3.5 3.5 - 5.1 mmol/L    Chloride 112 (H) 95 - 110 mmol/L     CO2 21 (L) 23 - 29 mmol/L    Glucose 95 70 - 110 mg/dL    BUN 9 6 - 20 mg/dL    Creatinine 0.7 0.5 - 1.4 mg/dL    Calcium 8.4 (L) 8.7 - 10.5 mg/dL    Anion Gap 9 8 - 16 mmol/L    eGFR if African American >60 >60 mL/min/1.73 m^2    eGFR if non African American >60 >60 mL/min/1.73 m^2       Diagnostic Results:  K 3>3.5    ASSESSMENT/PLAN:     Active Hospital Problems    Diagnosis  POA    *Cyst of right ovary [N83.201]  Yes    Lower abdominal pain [R10.30]  Yes      Resolved Hospital Problems   No resolved problems to display.       Assessment: Ofelia Ogden is a 36 y.o. No obstetric history on file. 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 Surgery was complicated by poor visualization due to adhesions, requiring conversion to ex-lap. Post operative course has been uncomplicated. Pt recovering well and in stable condition    Plan:   1. Post-op   - Overnight, VSS.   - Pain well controlled via scheduled ibuprofen/acetaminophen and oxy PRN. Dilaudid dc'd yesterday.  - Passive void trial completed and successful  - CBC Pre H/H 12/38>10.7/31.4  - CMP K 3<3.5 (Potassium replaced)  - Is tolerating PO. General diet. Antiemetics prn nausea/vomiting.  - Patient has not had a BM. Reports passing gas. Simethicone and Miralax PRN scheduled.   - Ambulating without difficulty.   -Tessalon pearls PRN for cough    2. Depression    - No meds     Dispo: As patient meets appropriate post-op milestones, plan for discharge to home.    Rachana Laughlin MD  OBGYN PGY-1

## 2022-06-20 NOTE — PLAN OF CARE
Problem: Adult Inpatient Plan of Care  Goal: Plan of Care Review  Outcome: Ongoing, Progressing  Goal: Absence of Hospital-Acquired Illness or Injury  Outcome: Ongoing, Progressing  Goal: Optimal Comfort and Wellbeing  Outcome: Ongoing, Progressing  Goal: Readiness for Transition of Care  Outcome: Ongoing, Progressing     Problem: Postoperative Urinary Retention (Surgery Nonspecified)  Goal: Effective Urinary Elimination  Outcome: Met     Problem: Bowel Motility Impaired (Surgery Nonspecified)  Goal: Effective Bowel Elimination  Outcome: Ongoing, Progressing     Problem: Bleeding (Surgery Nonspecified)  Goal: Absence of Bleeding  Outcome: Ongoing, Progressing     Problem: Pain Acute  Goal: Acceptable Pain Control and Functional Ability  Outcome: Ongoing, Progressing     Problem: Fall Injury Risk  Goal: Absence of Fall and Fall-Related Injury  Outcome: Ongoing, Progressing     Problem: Infection  Goal: Absence of Infection Signs and Symptoms  Outcome: Ongoing, Progressing

## 2022-06-24 LAB
FINAL PATHOLOGIC DIAGNOSIS: NORMAL
GROSS: NORMAL
Lab: NORMAL

## 2022-06-25 ENCOUNTER — TELEPHONE (OUTPATIENT)
Dept: OBSTETRICS AND GYNECOLOGY | Facility: HOSPITAL | Age: 37
End: 2022-06-25
Payer: MEDICAID

## 2022-06-25 ENCOUNTER — NURSE TRIAGE (OUTPATIENT)
Dept: ADMINISTRATIVE | Facility: CLINIC | Age: 37
End: 2022-06-25
Payer: MEDICAID

## 2022-06-25 DIAGNOSIS — G89.18 POST-OP PAIN: Primary | ICD-10-CM

## 2022-06-25 RX ORDER — OXYCODONE HYDROCHLORIDE 5 MG/1
5 TABLET ORAL EVERY 4 HOURS PRN
Qty: 10 TABLET | Refills: 0 | Status: SHIPPED | OUTPATIENT
Start: 2022-06-25

## 2022-06-25 RX ORDER — POLYETHYLENE GLYCOL 3350 17 G/17G
17 POWDER, FOR SOLUTION ORAL DAILY
Qty: 14 EACH | Refills: 0 | Status: SHIPPED | OUTPATIENT
Start: 2022-06-25 | End: 2022-07-09

## 2022-06-25 RX ORDER — ACETAMINOPHEN 500 MG
1000 TABLET ORAL EVERY 6 HOURS PRN
Qty: 30 TABLET | Refills: 0 | Status: SHIPPED | OUTPATIENT
Start: 2022-06-25

## 2022-06-25 NOTE — TELEPHONE ENCOUNTER
Patient called through the nurse stating she is having vaginal pressure, hot flashes, pain 8/10 minimally relieved with ibuprofen and oxycodone and spotting with wiping (not blood on pad)    Prior to surgery received depo provera in May. Has never had break through bleeding. Next dose iN august.     Denies fevers, chills, weakness, decreased appetite. Denies vaginal bleeding, discharge. Just vaginal spotting  Denies n/v.   Passing gas. Has had one bowel movement since surgery which was very small, not sure if she would call it a BM,.    Seen post op 3 days ago by her primary provider. Pelvic exam performed, but incisions evaluated and all clean dry intact.     Plan:    Pain/Vaginal pain/Constipation  -if pain 8/10 with no changes since surgery and minimal relief with ibuprofen/oxycodone, recommend returning to the ED   -pt has not bee taking tylenol.   -discussed recommend taking miralax daily secondary to constipation x 1 week since surgery   -discussed bowel movement should help relieved pain and vaginal pressure, but if it doesn't, recommend outpatient follow up  -pt out of oxycodone. Will give 10 more pills, but counseled that her pain should not be this bad and she should present to ED and if no relief with BM, she needs to present to ED   -also gave rx for tylenol and counseled patient to take ibuprofen and tylenol rotating every 3-4 hours as baseline scheduled pain control. And only take oxycodone for break through pain. Discussed oxycodone could cause worsening constipation     Hot flashes  -right ovary removed, other ovary in place  -discussed should resolve with time as her other ovary is still present    Vaginal spotting with wiping  -difficult to assess where spotting coming from  -discussed if spotting turns to bleeding and > 1 pad an hour, recommend ED evaluation  -discussed with depo provera, major pelvic surgery and oophorectomy vaginal spotting possible at this time    Message sent to Dr. Dominguez  jamal Nevarez MD  OBGYN PGY-3

## 2022-06-25 NOTE — TELEPHONE ENCOUNTER
Reason for Disposition   [1] Caller has URGENT question AND [2] triager unable to answer question    Additional Information   Negative: Shock suspected (e.g., cold/pale/clammy skin, too weak to stand, low BP, rapid pulse)   Negative: Difficult to awaken or acting confused  (e.g., disoriented, slurred speech)   Negative: Passed out (i.e., lost consciousness, collapsed and was not responding)   Negative: Sounds like a life-threatening emergency to the triager   Negative: SEVERE abdominal pain   Negative: SEVERE dizziness (e.g., unable to stand, requires support to walk, feels like passing out now)   Negative: SEVERE vaginal bleeding (i.e., soaking 2 pads or tampons per hour and present 2 or more hours)   Negative: Patient sounds very sick or weak to the triager   Negative: MODERATE vaginal bleeding (i.e., soaking 1 pad or tampon per hour and present > 6 hours)   Negative: [1] Constant abdominal pain AND [2] present > 2 hours   Negative: Pale skin (pallor) of new onset or worsening   Negative: Passed tissue (e.g., gray-white)   Negative: Taking Coumadin (warfarin) or other strong blood thinner, or known bleeding disorder (e.g., thrombocytopenia)   Negative: [1] Skin bruises or nosebleed AND [2] not caused by an injury   Negative: [1] Periods with > 6 soaked pads or tampons per day AND [2] last > 7 days   Negative: [1] Bleeding or spotting after procedure (e.g., biopsy) or pelvic examination (e.g., pap smear) AND [2] lasts > 7 days   Negative: [1] Bleeding or spotting occurs after sex (Exception: first intercourse) AND [2] lasts > 7 days   Negative: Sounds like a life-threatening emergency to the triager   Negative: [1] Widespread rash AND [2] bright red, sunburn-like   Negative: [1] SEVERE headache AND [2] after spinal (epidural) anesthesia   Negative: [1] Vomiting AND [2] persists > 4 hours   Negative: [1] Vomiting AND [2] abdomen looks much more swollen than usual   Negative: [1] Drinking  "very little AND [2] dehydration suspected (e.g., no urine > 12 hours, very dry mouth, very lightheaded)   Negative: Patient sounds very sick or weak to the triager   Negative: Sounds like a serious complication to the triager   Negative: Fever > 100.4 F (38.0 C)   Negative: [1] SEVERE post-op pain (e.g., excruciating, pain scale 8-10) AND [2] not controlled with pain medications    Answer Assessment - Initial Assessment Questions  1. AMOUNT: "Describe the bleeding that you are having."     - SPOTTING: spotting, or pinkish / brownish mucous discharge; does not fill panti-liner or pad     - MILD:  less than 1 pad / hour; less than patient's usual menstrual bleeding    - MODERATE: 1-2 pads / hour; small-medium blood clots (e.g., pea, grape, small coin)     - SEVERE: soaking 2 or more pads/hour for 2 or more hours; bleeding not contained by pads or continuous red blood from vagina; large blood clots (e.g., golf ball, large coin)       Spotting only, not needing a pad, bright red, and only when wiping. No other time.    2. ONSET: "When did the bleeding begin?" "Is it continuing now?"      Two days ago.    3. MENSTRUAL PERIOD: "When was the last normal menstrual period?" "How is this different than your period?"      On Depo, last menstrual period 18 mos ago.    4. REGULARITY: "How regular are your periods?"      N/a    5. ABDOMINAL PAIN: "Do you have any pain?" "How bad is the pain?"  (e.g., Scale 1-10; mild, moderate, or severe)    - MILD (1-3): doesn't interfere with normal activities, abdomen soft and not tender to touch     - MODERATE (4-7): interferes with normal activities or awakens from sleep, tender to touch     - SEVERE (8-10): excruciating pain, doubled over, unable to do any normal activities       Pressure, but not pain.    6. PREGNANCY: "Could you be pregnant?" "Are you sexually active?"      No.  Just had oophorectomy for ovarian cyst 06/18/2022    7. BREASTFEEDING: "Are you breastfeeding?"      " "N/a    8. HORMONES: "Are you taking any hormone medications, prescription or OTC?" (e.g., birth control pills, estrogen)      Depo shots only.    9. BLOOD THINNERS: "Do you take any blood thinners?" (e.g., Coumadin/warfarin, Pradaxa/dabigatran, aspirin)      No blood thinners.    10. CAUSE: "What do you think is causing the bleeding?" (e.g., recent gyn surgery, recent gyn procedure; known bleeding disorder, cervical cancer, polycystic ovarian disease, fibroids)          Post op ?    11. HEMODYNAMIC STATUS: "Are you weak or feeling lightheaded?" If so, ask: "Can you stand and walk normally?"         Not weak or lightheaded.  She is able to stand and walk normally.    12. OTHER SYMPTOMS: "What other symptoms are you having with the bleeding?" (e.g., passed tissue, vaginal discharge, fever, menstrual-type cramps)        No to all.  Does have hot flashes that began Tuesday. Was up all night.    Protocols used: POST-OP SYMPTOMS AND [id number], ST VAGINAL BLEEDING - [id number]    Ofelia's mom Natalie called to say she had right ovarian cyst removed last Friday with [facility] Dr Monique Hamilton.  She says that she is having blood in her urine, and states only happens when she wipes herself. No fever. She states it is not from bladder, has no pain or urgency with urination, but says it is only visible when she wipes after urination.  Difficult for her to say definitively the origination of this scant blood.  Bright red blood; using a pad but no blood in between wiping occasions. She states no abdominal pain, but does admit pressure in pelvic area.  She wants to know if this is normal. She has not had a period in a while as she is on Depo shots for 2 years.   She did have post op follow up with Dr Victor on 06/22/2022, but says this spotting began only on 06/23/2022, a day after this follow up. Per [facility] triage protocol, recommend call to OBGYN surgeon on call, resident paged at [phone] with request to return call to " priority line [phone].   Spoke with Dr Nevarez, on call GYN. Provided above information to her, and she will call Ofelia on line [phone].  Explained same to Ofelia, and she is waiting for this call from MD.  Message to Dr Nevarez, Dr Mariposa Kruger, and Dr Gilda Jackson, pcp, [facility],  Dr Pola Robertson, GYN, [facility].  Please contact caller directly with any additional care advice.

## 2022-12-08 ENCOUNTER — HOSPITAL ENCOUNTER (EMERGENCY)
Facility: OTHER | Age: 37
Discharge: HOME | End: 2022-12-08
Attending: EMERGENCY MEDICINE
Payer: MEDICAID

## 2022-12-08 VITALS
HEIGHT: 67 IN | WEIGHT: 214 LBS | BODY MASS INDEX: 33.59 KG/M2 | HEART RATE: 69 BPM | SYSTOLIC BLOOD PRESSURE: 121 MMHG | OXYGEN SATURATION: 99 % | TEMPERATURE: 99 F | RESPIRATION RATE: 16 BRPM | DIASTOLIC BLOOD PRESSURE: 75 MMHG

## 2022-12-08 DIAGNOSIS — B34.9 ACUTE VIRAL SYNDROME: Primary | ICD-10-CM

## 2022-12-08 LAB
ANION GAP SERPL CALC-SCNC: 10 MMOL/L (ref 8–16)
BACTERIA #/AREA URNS HPF: ABNORMAL /HPF
BASOPHILS # BLD AUTO: 0.02 K/UL (ref 0–0.2)
BASOPHILS NFR BLD: 0.4 % (ref 0–1.9)
BILIRUB UR QL STRIP: NEGATIVE
BUN SERPL-MCNC: 13 MG/DL (ref 6–20)
CALCIUM SERPL-MCNC: 8.7 MG/DL (ref 8.7–10.5)
CHLORIDE SERPL-SCNC: 112 MMOL/L (ref 95–110)
CLARITY UR: ABNORMAL
CO2 SERPL-SCNC: 18 MMOL/L (ref 23–29)
COLOR UR: YELLOW
CREAT SERPL-MCNC: 0.7 MG/DL (ref 0.5–1.4)
CTP QC/QA: YES
CTP QC/QA: YES
DIFFERENTIAL METHOD: ABNORMAL
EOSINOPHIL # BLD AUTO: 0.2 K/UL (ref 0–0.5)
EOSINOPHIL NFR BLD: 2.8 % (ref 0–8)
ERYTHROCYTE [DISTWIDTH] IN BLOOD BY AUTOMATED COUNT: 14.4 % (ref 11.5–14.5)
EST. GFR  (NO RACE VARIABLE): >60 ML/MIN/1.73 M^2
GLUCOSE SERPL-MCNC: 92 MG/DL (ref 70–110)
GLUCOSE UR QL STRIP: NEGATIVE
HCT VFR BLD AUTO: 34.1 % (ref 37–48.5)
HGB BLD-MCNC: 11.9 G/DL (ref 12–16)
HGB UR QL STRIP: NEGATIVE
IMM GRANULOCYTES # BLD AUTO: 0.02 K/UL (ref 0–0.04)
IMM GRANULOCYTES NFR BLD AUTO: 0.4 % (ref 0–0.5)
KETONES UR QL STRIP: NEGATIVE
LEUKOCYTE ESTERASE UR QL STRIP: ABNORMAL
LYMPHOCYTES # BLD AUTO: 2.2 K/UL (ref 1–4.8)
LYMPHOCYTES NFR BLD: 40.6 % (ref 18–48)
MCH RBC QN AUTO: 33.1 PG (ref 27–31)
MCHC RBC AUTO-ENTMCNC: 34.9 G/DL (ref 32–36)
MCV RBC AUTO: 95 FL (ref 82–98)
MICROSCOPIC COMMENT: ABNORMAL
MONOCYTES # BLD AUTO: 0.4 K/UL (ref 0.3–1)
MONOCYTES NFR BLD: 8 % (ref 4–15)
NEUTROPHILS # BLD AUTO: 2.6 K/UL (ref 1.8–7.7)
NEUTROPHILS NFR BLD: 47.8 % (ref 38–73)
NITRITE UR QL STRIP: NEGATIVE
NRBC BLD-RTO: 0 /100 WBC
PH UR STRIP: 7 [PH] (ref 5–8)
PLATELET # BLD AUTO: 275 K/UL (ref 150–450)
PMV BLD AUTO: 10 FL (ref 9.2–12.9)
POC MOLECULAR INFLUENZA A AGN: NEGATIVE
POC MOLECULAR INFLUENZA B AGN: NEGATIVE
POTASSIUM SERPL-SCNC: 3.3 MMOL/L (ref 3.5–5.1)
PROT UR QL STRIP: NEGATIVE
RBC # BLD AUTO: 3.6 M/UL (ref 4–5.4)
RBC #/AREA URNS HPF: 2 /HPF (ref 0–4)
SARS-COV-2 RDRP RESP QL NAA+PROBE: NEGATIVE
SODIUM SERPL-SCNC: 140 MMOL/L (ref 136–145)
SP GR UR STRIP: 1.02 (ref 1–1.03)
SQUAMOUS #/AREA URNS HPF: 30 /HPF
URN SPEC COLLECT METH UR: ABNORMAL
UROBILINOGEN UR STRIP-ACNC: ABNORMAL EU/DL
WBC # BLD AUTO: 5.4 K/UL (ref 3.9–12.7)
WBC #/AREA URNS HPF: 45 /HPF (ref 0–5)

## 2022-12-08 PROCEDURE — 87086 URINE CULTURE/COLONY COUNT: CPT | Performed by: EMERGENCY MEDICINE

## 2022-12-08 PROCEDURE — 87186 SC STD MICRODIL/AGAR DIL: CPT | Performed by: EMERGENCY MEDICINE

## 2022-12-08 PROCEDURE — 87088 URINE BACTERIA CULTURE: CPT | Performed by: EMERGENCY MEDICINE

## 2022-12-08 PROCEDURE — 80048 BASIC METABOLIC PNL TOTAL CA: CPT | Performed by: EMERGENCY MEDICINE

## 2022-12-08 PROCEDURE — 87635 SARS-COV-2 COVID-19 AMP PRB: CPT | Performed by: EMERGENCY MEDICINE

## 2022-12-08 PROCEDURE — 87077 CULTURE AEROBIC IDENTIFY: CPT | Performed by: EMERGENCY MEDICINE

## 2022-12-08 PROCEDURE — 81000 URINALYSIS NONAUTO W/SCOPE: CPT | Performed by: EMERGENCY MEDICINE

## 2022-12-08 PROCEDURE — 99283 EMERGENCY DEPT VISIT LOW MDM: CPT

## 2022-12-08 PROCEDURE — 85025 COMPLETE CBC W/AUTO DIFF WBC: CPT | Performed by: EMERGENCY MEDICINE

## 2022-12-08 RX ORDER — FLUCONAZOLE 150 MG/1
150 TABLET ORAL ONCE
Qty: 2 TABLET | Refills: 0 | Status: SHIPPED | OUTPATIENT
Start: 2022-12-08 | End: 2022-12-08

## 2022-12-08 NOTE — ED PROVIDER NOTES
Encounter Date: 12/8/2022    SCRIBE #1 NOTE: INorman, am scribing for, and in the presence of,  Kaitlyn Cody MD.     History     Chief Complaint   Patient presents with    Sore     Pt c/o sore throat, cough body aches for 2 weeks      Time seen by provider: 2:25 AM    This is a 37 y.o. female who presents with complaint of body aches, fatigue, and congestion for the past 2 weeks. The patient also reports sinus pressure and cold sweats. She reports taking Dayquil and Nyquil for her symptoms without relief. She denies any known sick contacts. The patient also complains of irritation to her vagina and white vaginal discharge that she noticed today. She states she has a history of yeast infections and she notes that her current symptoms feel similar. This is the extent of the patient's complaints at this time.    The history is provided by the patient.   Review of patient's allergies indicates:  No Known Allergies  Past Medical History:   Diagnosis Date    Depression      Past Surgical History:   Procedure Laterality Date    DIAGNOSTIC LAPAROSCOPY N/A 6/18/2022    Procedure: LAPAROSCOPY, DIAGNOSTIC;  Surgeon: Mariposa Kruger MD;  Location: [facility];  Service: OB/GYN;  Laterality: N/A;    LAPAROSCOPIC LYSIS OF ADHESIONS  6/18/2022    Procedure: LYSIS, ADHESIONS, LAPAROSCOPIC;  Surgeon: Mariposa Kruger MD;  Location: [facility];  Service: OB/GYN;;    SALPINGOOPHORECTOMY Right 6/18/2022    Procedure: SALPINGO-OOPHORECTOMY;  Surgeon: Mariposa Kruger MD;  Location: [facility];  Service: OB/GYN;  Laterality: Right;     No family history on file.  Social History     Tobacco Use    Smoking status: Never   Substance Use Topics    Alcohol use: Yes     Comment: occassional    Drug use: No     Review of Systems   Constitutional:  Positive for diaphoresis and fatigue. Negative for activity change, appetite change, chills and fever.   HENT:  Positive for congestion and sinus pressure. Negative for sore throat  and trouble swallowing.    Eyes:  Negative for photophobia and visual disturbance.   Respiratory:  Negative for cough, chest tightness and shortness of breath.    Cardiovascular:  Negative for chest pain.   Gastrointestinal:  Negative for abdominal pain, nausea and vomiting.   Endocrine: Negative for polydipsia and polyuria.   Genitourinary:  Positive for vaginal discharge and vaginal pain. Negative for difficulty urinating and flank pain.   Musculoskeletal:  Positive for myalgias. Negative for back pain and neck pain.   Skin:  Negative for rash.   Neurological:  Negative for weakness and headaches.   Psychiatric/Behavioral:  Negative for confusion.    All other systems reviewed and are negative.    Physical Exam     Initial Vitals [12/08/22 0117]   BP Pulse Resp Temp SpO2   121/75 69 16 99 °F (37.2 °C) 99 %      MAP       --         Physical Exam    Nursing note and vitals reviewed.  Constitutional: She appears well-developed and well-nourished. She does not have a sickly appearance. No distress.   HENT:   Head: Normocephalic and atraumatic.   Right Ear: External ear normal.   Left Ear: External ear normal.   Eyes: Conjunctivae, EOM and lids are normal. Right eye exhibits no discharge. Left eye exhibits no discharge. Right conjunctiva is not injected. Right conjunctiva has no hemorrhage. Left conjunctiva is not injected. Left conjunctiva has no hemorrhage. No scleral icterus.   Neck: Phonation normal. No stridor present. No tracheal deviation present.   Normal range of motion.  Cardiovascular:  Normal rate, regular rhythm and normal heart sounds.     Exam reveals no friction rub.       No murmur heard.  Pulses:       Radial pulses are 2+ on the right side and 2+ on the left side.        Dorsalis pedis pulses are 2+ on the right side and 2+ on the left side.   Pulmonary/Chest: Breath sounds normal. No respiratory distress. She has no wheezes. She has no rhonchi. She has no rales.   Abdominal: Abdomen is soft. She  exhibits no distension. There is no abdominal tenderness.   Musculoskeletal:      Cervical back: Normal range of motion.     Neurological: She is alert and oriented to person, place, and time. She has normal strength. GCS eye subscore is 4. GCS verbal subscore is 5. GCS motor subscore is 6.   Skin: Skin is warm.   Psychiatric: She has a normal mood and affect. Her speech is normal and behavior is normal. Judgment and thought content normal. Cognition and memory are normal.       ED Course   Procedures  Labs Reviewed   URINALYSIS, REFLEX TO URINE CULTURE - Abnormal; Notable for the following components:       Result Value    Appearance, UA Hazy (*)     Urobilinogen, UA 4.0-6.0 (*)     Leukocytes, UA 1+ (*)     All other components within normal limits    Narrative:     Specimen Source->Urine   BASIC METABOLIC PANEL - Abnormal; Notable for the following components:    Potassium 3.3 (*)     Chloride 112 (*)     CO2 18 (*)     All other components within normal limits   URINALYSIS MICROSCOPIC - Abnormal; Notable for the following components:    WBC, UA 45 (*)     Bacteria Many (*)     All other components within normal limits    Narrative:     Specimen Source->Urine   CBC W/ AUTO DIFFERENTIAL - Abnormal; Notable for the following components:    RBC 3.60 (*)     Hemoglobin 11.9 (*)     Hematocrit 34.1 (*)     MCH 33.1 (*)     All other components within normal limits   CULTURE, URINE   POCT INFLUENZA A/B MOLECULAR   SARS-COV-2 RDRP GENE          Imaging Results    None          Medications - No data to display  Medical Decision Making:   History:   Old Medical Records: I decided to obtain old medical records.  Clinical Tests:   Lab Tests: Ordered and Reviewed  Additional MDM:   Comments: 37-year-old female presents afebrile, hemodynamically stable and well-appearing with multiple symptoms consistent with a viral illness that has been present for approximately 2 weeks.  Physical exam unremarkable.  COVID and flu test  obtained and negative.  Lab work obtained given duration of symptoms and without significant abnormalities.  H&H is actually improved compared to previous.  UA was positive for bacteria and white blood cells, but there was also 30 squamous cells.  Given the absence of any urinary symptoms, do not feel she needs to be treated for UTI.  Will give a prescription for fluconazole for yeast infection.  Patient was instructed on supportive care for home and PCP follow-up for re-evaluation in 1 week if symptoms persist..      Scribe Attestation:   Scribe #1: I performed the above scribed service and the documentation accurately describes the services I performed. I attest to the accuracy of the note.          Physician Attestation for Scribe: I, Kaitlyn Coyd   , reviewed documentation as scribed in my presence, which is both accurate and complete.           Clinical Impression:   Final diagnoses:  [B34.9] Acute viral syndrome (Primary)      ED Disposition Condition    Discharge Stable          ED Prescriptions       Medication Sig Dispense Start Date End Date Auth. Provider    fluconazole (DIFLUCAN) 150 MG Tab (Expires today) Take 1 tablet (150 mg total) by mouth once. for 1 dose 2 tablet 12/8/2022 12/8/2022 Kaitlyn Cody MD          Follow-up Information       Follow up With Specialties Details Why Contact Info    Gilda Jackson MD Internal Medicine Schedule an appointment as soon as possible for a visit   [address] [id number] [phone]               Kaitlyn Cody MD  12/08/22 3455

## 2022-12-10 LAB — BACTERIA UR CULT: ABNORMAL

## 2023-03-06 ENCOUNTER — HOSPITAL ENCOUNTER (EMERGENCY)
Facility: OTHER | Age: 38
Discharge: HOME | End: 2023-03-06
Attending: EMERGENCY MEDICINE
Payer: MEDICAID

## 2023-03-06 VITALS
RESPIRATION RATE: 20 BRPM | TEMPERATURE: 99 F | DIASTOLIC BLOOD PRESSURE: 68 MMHG | SYSTOLIC BLOOD PRESSURE: 112 MMHG | OXYGEN SATURATION: 100 % | HEIGHT: 67 IN | HEART RATE: 73 BPM | WEIGHT: 236 LBS | BODY MASS INDEX: 37.04 KG/M2

## 2023-03-06 DIAGNOSIS — U07.1 COVID-19: Primary | ICD-10-CM

## 2023-03-06 LAB
B-HCG UR QL: NEGATIVE
CTP QC/QA: YES
POC MOLECULAR INFLUENZA A AGN: NEGATIVE
POC MOLECULAR INFLUENZA B AGN: NEGATIVE
SARS-COV-2 RDRP RESP QL NAA+PROBE: POSITIVE

## 2023-03-06 PROCEDURE — 81025 URINE PREGNANCY TEST: CPT | Performed by: NURSE PRACTITIONER

## 2023-03-06 PROCEDURE — 25000003 PHARM REV CODE 250: Performed by: NURSE PRACTITIONER

## 2023-03-06 PROCEDURE — 63600175 PHARM REV CODE 636 W HCPCS: Performed by: NURSE PRACTITIONER

## 2023-03-06 PROCEDURE — 99284 EMERGENCY DEPT VISIT MOD MDM: CPT | Mod: 25

## 2023-03-06 PROCEDURE — 25000242 PHARM REV CODE 250 ALT 637 W/ HCPCS: Performed by: NURSE PRACTITIONER

## 2023-03-06 PROCEDURE — 94640 AIRWAY INHALATION TREATMENT: CPT

## 2023-03-06 RX ORDER — ACETAMINOPHEN 325 MG/1
650 TABLET ORAL
Status: COMPLETED | OUTPATIENT
Start: 2023-03-06 | End: 2023-03-06

## 2023-03-06 RX ORDER — ALBUTEROL SULFATE 90 UG/1
2 AEROSOL, METERED RESPIRATORY (INHALATION) EVERY 6 HOURS PRN
Qty: 18 G | Refills: 0 | Status: SHIPPED | OUTPATIENT
Start: 2023-03-06 | End: 2024-03-05

## 2023-03-06 RX ORDER — PROMETHAZINE HYDROCHLORIDE AND DEXTROMETHORPHAN HYDROBROMIDE 6.25; 15 MG/5ML; MG/5ML
5 SYRUP ORAL EVERY 4 HOURS PRN
Qty: 118 ML | Refills: 0 | Status: SHIPPED | OUTPATIENT
Start: 2023-03-06 | End: 2023-03-16

## 2023-03-06 RX ORDER — PREDNISONE 20 MG/1
60 TABLET ORAL
Status: COMPLETED | OUTPATIENT
Start: 2023-03-06 | End: 2023-03-06

## 2023-03-06 RX ADMIN — IPRATROPIUM BROMIDE AND ALBUTEROL 2 PUFF: 20; 100 SPRAY, METERED RESPIRATORY (INHALATION) at 06:03

## 2023-03-06 RX ADMIN — PREDNISONE 60 MG: 20 TABLET ORAL at 05:03

## 2023-03-06 RX ADMIN — ACETAMINOPHEN 650 MG: 325 TABLET, FILM COATED ORAL at 05:03

## 2023-03-06 NOTE — Clinical Note
"Ofelia"Audelia Ogden was seen and treated in our emergency department on 3/6/2023.  She may return to work on 03/12/2023.       If you have any questions or concerns, please don't hesitate to call.      DIANE Hwang"

## 2023-03-06 NOTE — FIRST PROVIDER EVALUATION
Emergency Department TeleTriage Encounter Note      CHIEF COMPLAINT    Chief Complaint   Patient presents with    Cough     Reports dry cough and fever that started last night. Also reports positive home COVID test. Denies any chills.        VITAL SIGNS   Initial Vitals [03/06/23 1635]   BP Pulse Resp Temp SpO2   (!) 147/85 82 -- 98.7 °F (37.1 °C) 96 %      MAP       --            ALLERGIES    Review of patient's allergies indicates:  No Known Allergies    PROVIDER TRIAGE NOTE  This is a teletriage evaluation of a 37 y.o. female presenting to the ED with c/o cough that began last night with fever. Limited physical exam via telehealth: The patient is awake, alert, answering questions appropriately and is not in respiratory distress. Initial orders will be placed and care will be transferred to an alternate provider when patient is roomed for a full evaluation. Any additional orders and the final disposition will be determined by that provider.         ORDERS  Labs Reviewed - No data to display    ED Orders (720h ago, onward)      Start Ordered     Status Ordering Provider    03/06/23 1645 03/06/23 1640  acetaminophen tablet 650 mg  ED 1 Time         Ordered MARCO A MILLER    Unscheduled 03/06/23 1640  POCT COVID-19 Rapid Screening  Once         Ordered MARCO A MILLER    Unscheduled 03/06/23 1640  POCT Influenza A/B Molecular  Once         Ordered MARCO A MILLER    Unscheduled 03/06/23 1640  POCT urine pregnancy  Once         Ordered MARCO A MILLER              Virtual Visit Note: The provider triage portion of this emergency department evaluation and documentation was performed via [employer], a HIPAA-compliant telemedicine application, in concert with a tele-presenter in the room. A face to face patient evaluation with one of my colleagues will occur once the patient is placed in an emergency department room.      DISCLAIMER: This note was prepared with [employer] voice recognition transcription software.  Garbled syntax, mangled pronouns, and other bizarre constructions may be attributed to that software system.

## 2023-03-07 NOTE — ED PROVIDER NOTES
Source of History:  Patient    Chief complaint:  Cough (Reports dry cough and fever that started last night. Also reports positive home COVID test. Denies any chills. )      HPI:  Ofelia Ogden is a 37 y.o. female presenting with complaint of dry cough, body aches and subjective fever that began last night.  States she would a positive COVID test at home yesterday.  Denies any shortness of breath or chest pain.  Is taking Tylenol at home.    This is the extent to the patients complaints today here in the emergency department.    PMH:  As per HPI and below:  Past Medical History:   Diagnosis Date    Depression      Past Surgical History:   Procedure Laterality Date    DIAGNOSTIC LAPAROSCOPY N/A 6/18/2022    Procedure: LAPAROSCOPY, DIAGNOSTIC;  Surgeon: Mariposa Kruger MD;  Location: [facility];  Service: OB/GYN;  Laterality: N/A;    LAPAROSCOPIC LYSIS OF ADHESIONS  6/18/2022    Procedure: LYSIS, ADHESIONS, LAPAROSCOPIC;  Surgeon: Mariposa Kruger MD;  Location: [facility];  Service: OB/GYN;;    SALPINGOOPHORECTOMY Right 6/18/2022    Procedure: SALPINGO-OOPHORECTOMY;  Surgeon: Mariposa Kruger MD;  Location: [facility];  Service: OB/GYN;  Laterality: Right;       Social History     Tobacco Use    Smoking status: Never   Substance Use Topics    Alcohol use: Yes     Comment: occassional    Drug use: No     Review of patient's allergies indicates:  No Known Allergies    ROS: As per HPI and below:  General:  Subjective fever/chills, body aches.  Eyes: No visual changes.  ENT:  Cough, congestion  Head: headache.    Chest:  No shortness of breath.  Cardiovascular: No chest pain.  Abdomen: No abdominal pain.  No nausea or vomiting.   Genito-Urinary: No abnormal urination.  Neurologic: No focal weakness.  No numbness.  MSK: no back pain.  Integument: No rashes or lesions.  Hematologic: No easy bruising.  Endocrine: No excessive thirst or urination.    Physical Exam:    /68 (BP Location: Left arm, Patient  "Position: Sitting)   Pulse 73   Temp 98.9 °F (37.2 °C) (Oral)   Resp 20   Ht 5' 7" (1.702 m)   Wt 107 kg (236 lb)   SpO2 100%   BMI 36.96 kg/m²   Vitals:    03/06/23 1635 03/06/23 1728 03/06/23 1810 03/06/23 1837   BP: (!) 147/85   112/68   Pulse: 82  80 73   Resp:  20 16 20   Temp: 98.7 °F (37.1 °C)   98.9 °F (37.2 °C)   TempSrc: Oral   Oral   SpO2: 96%   100%   Weight: 107 kg (236 lb)      Height: 5' 7" (1.702 m)          Nursing note and vital signs reviewed.  Appearance: No acute distress.  Well-appearing, nontoxic  Eyes:  No conjunctival injection.  Extraocular muscles are intact.  ENT: Oropharynx clear. No tonsillar exudate or swelling. Uvula midline and normal. No elevation of posterior oropharynx.  MM are pink and moist.   Bilateral TM's are pearly grey.  Lymph: No cervical lymphadenopathy.   Chest/ Respiratory:  Patient well-appearing, in no respiratory distress, breathing is equal and nonlabored, no accessory muscle usage is noted. Patient's speaking in full sentences.  Clear to auscultation bilaterally.  Good air movement.  No wheezes.  No rhonchi. No rales. No accessory muscle use.  Cardiovascular:  Regular rate and rhythm.  No murmurs. No gallops. No rubs.  Musculoskeletal: Neck supple.  No meningismus.  Skin: No rashes seen.  Good turgor.  No abrasions.  No ecchymoses.  Neuro: alert and oriented x3,  no focal neurological deficits.  Psych: Appropriate, conversant    Initial MDM:  37-year-old female with positive COVID test at home yesterday presents for evaluation of cough, congestion and body aches.  COVID swab was ordered in triage, it is positive in the ED today.  She is no complaints of shortness of breath or chest pain.  However reports last time she had COVID she had pneumonia.  Will obtain an x-ray and ambulatory pulse ox.    Labs that have been ordered have been independently reviewed and interpreted by myself.  Labs Reviewed   SARS-COV-2 RDRP GENE - Abnormal; Notable for the following " components:       Result Value    POC Rapid COVID Positive (*)     All other components within normal limits    Narrative:     This test utilizes isothermal nucleic acid amplification technology to detect the SARS-CoV-2 RdRp nucleic acid segment. The analytical sensitivity (limit of detection) is 500 copies/swab.     A POSITIVE result is indicative of the presence of SARS-CoV-2 RNA; clinical correlation with patient history and other diagnostic information is necessary to determine patient infection status.    A NEGATIVE result means that SARS-CoV-2 nucleic acids are not present above the limit of detection. A NEGATIVE result should be treated as presumptive. It does not rule out the possibility of COVID-19 and should not be the sole basis for treatment decisions. If COVID-19 is strongly suspected based on clinical and exposure history, re-testing using an alternate molecular assay should be considered.     This test is only for use under the Food and Drug Administration s Emergency Use Authorization (EUA).     Commercial kits are provided by [employer]. Performance characteristics of the EUA have been independently verified by [facility] Department of Pathology and Laboratory Medicine.   _________________________________________________________________   The authorized Fact Sheet for Healthcare Providers and the authorized Fact Sheet for Patients of the ID NOW COVID-19 are available on the FDA website:    https://www.fda.gov/media/784574/download      https://www.fda.gov/media/063740/download       POCT INFLUENZA A/B MOLECULAR   POCT URINE PREGNANCY    Narrative:     This test utilizes isothermal nucleic acid amplification technology to detect the SARS-CoV-2 RdRp nucleic acid segment. The analytical sensitivity (limit of detection) is 500 copies/swab.     A POSITIVE result is indicative of the presence of SARS-CoV-2 RNA; clinical correlation with patient history and other diagnostic information  is necessary to determine patient infection status.    A NEGATIVE result means that SARS-CoV-2 nucleic acids are not present above the limit of detection. A NEGATIVE result should be treated as presumptive. It does not rule out the possibility of COVID-19 and should not be the sole basis for treatment decisions. If COVID-19 is strongly suspected based on clinical and exposure history, re-testing using an alternate molecular assay should be considered.     This test is only for use under the Food and Drug Administration s Emergency Use Authorization (EUA).     Commercial kits are provided by [employer]. Performance characteristics of the EUA have been independently verified by [facility] Department of Pathology and Laboratory Medicine.   _________________________________________________________________   The authorized Fact Sheet for Healthcare Providers and the authorized Fact Sheet for Patients of the ID NOW COVID-19 are available on the FDA website:    https://www.fda.gov/media/836357/download      https://www.fda.gov/media/317911/download           X-Ray Chest 1 View   Final Result      1. No acute cardiopulmonary process.         Electronically signed by: Jonathan Hopkins MD   Date:    03/06/2023   Time:    18:03            Initial Impression/ Differential Dx:  Differential Diagnosis includes, but is not limited to:  meningitis, nasal foreign body, otitis media/external, bacterial sinusitis, allergic rhinitis, influenza, bacterial/viral pharyngitis, bacterial/viral pneumonia, covid-19      MDM:    37 y.o. female with URI symptoms since yesterday.  Patient was seen for a viral-like illness, therefore due to symptoms it is the most recent recommendations from our hospital administrations/infectious disease at this time, the patient was swabbed for COVID and tested positive. I discussed with the patient the need to self quarantine at home for 5 days from onset of symptoms.  Reinforced this advice  and the dangers failing to comply presents to the public.  Symptomatic treatment in the interim.  Work note for one week was provided. Return precautions including worsening fever that is uncontrollable, shortness of breath or chest pain were discussed.  They ambulated around the emergency department and maintain oxygen saturation of 98% without SOB or LOYOLA. Vital signs did not indicate sepsis and patient was welling appear, okay for discharge home for quarantine.  Additional verbal discharge instructions were given and discussed with the patient, return precautions were given and the patient verbalized understanding.          ED Course as of 03/07/23 1234   Mon Mar 06, 2023   1702 SARS-CoV-2 RNA, Amplification, Qual(!): Positive [AS]      ED Course User Index  [AS] DIANE Hwang       Diagnostic Impression:    1. COVID-19         ED Disposition Condition    Discharge Stable            ED Prescriptions       Medication Sig Dispense Start Date End Date Auth. Provider    promethazine-dextromethorphan (PROMETHAZINE-DM) 6.25-15 mg/5 mL Syrp Take 5 mLs by mouth every 4 (four) hours as needed. 118 mL 3/6/2023 3/16/2023 DIANE Hwang    albuterol (VENTOLIN HFA) 90 mcg/actuation inhaler Inhale 2 puffs into the lungs every 6 (six) hours as needed for Wheezing. Rescue 18 g 3/6/2023 3/5/2024 DIANE Hwang          Follow-up Information       Follow up With Specialties Details Why Contact Info    Gilda Jackson MD Internal Medicine Schedule an appointment as soon as possible for a visit in 3 days  [address]  [address] [facility] [address]  [phone]      [facility] Emergency Dept Emergency Medicine Go to  If symptoms worsen [address] [id number] [phone]                 DIANE Hwang  03/07/23 1234

## 2023-03-07 NOTE — DISCHARGE INSTRUCTIONS
If you begin to develop any of these symptoms:    1) Uncontrollable fever  2) Shortness of breath  3) Chest pain    Please return to the ED for continued evaluation and treatment.    If you have a COVID Test PENDING:  Please access [facility] to review the results of your test. Until the results of your COVID test return, you should isolate yourself so as not to potentially spread illness to others.   If your COVID test returns positive, you should isolate yourself so as not to spread illness to others. After five full days, if you are feeling better and you have not had fever for 24 hours, you can return to your typical daily activities, but you must wear a mask around others for an additional 5 days.   If your COVID test returns negative and you are either unvaccinated or more than six months out from your two-dose vaccine and are not yet boosted, you should still quarantine for 5 full days followed by strict mask use for an additional 5 full days.   If your COVID test returns negative and you have received your 2-dose initial vaccine as well as a booster, you should continue strict mask use for 10 full days after the exposure.  For all those exposed, best practice includes a test at day 5 after the exposure. This can be a home test or a test through one of the many testing centers throughout our community.   Masking is always advised to limit the spread of COVID. Cdc.gov is an excellent site to obtain the latest up to date recommendations regarding COVID and COVID testing.     After your evaluation today, we ruled out any emergent condition. However, if you develop shortness of breath, chest pain, or ANY OTHER CONCERNS please return to the emergency department for further care.      CDC Testing and Quarantine Guidelines for patients with exposure to a known-positive COVID-19 person:  A close exposure is defined as anyone who has had an exposure (masked or unmasked) to a known COVID -19 positive person within 6  feet of someone for a cumulative total of 15 minutes or more over a 24-hour period.   Vaccinated and/or if you recently had a positive covid test within 90 days do NOT need to quarantine after contact with someone who had COVID-19 unless you develop symptoms.   Fully vaccinated people who have not had a positive test within 90 days, should get tested 3-5 days after their exposure, even if they don't have symptoms and wear a mask indoors in public for 14 days following exposure or until their test result is negative.      Unvaccinated and/or NOT had a positive test within 90 days and meet close exposure  You are required by CDC guidelines to quarantine for at least 5 days from time of exposure followed by 5 days of strict masking. It is recommended, but not required to test after 5 days, unless you develop symptoms, in which case you should test at that time.  If you get tested after 5 days and your test is positive, your 5 day period of isolation starts the day of the positive test.    If your exposure does not meet the above definition, you can return to your normal daily activities to include social distancing, wearing a mask and frequent handwashing.      Here is a link to guidance from the CDC:  https://www.cdc.gov/media/releases/2021/s1227-isolation-quarantine-guidance.html      [facility] Testing Sites:  https://ldh.la.gov/page/3934      [facility] website with testing locations and guidance:  [email or web address]

## 2023-04-24 ENCOUNTER — HOSPITAL ENCOUNTER (EMERGENCY)
Facility: OTHER | Age: 38
Discharge: HOME | End: 2023-04-24
Attending: EMERGENCY MEDICINE
Payer: MEDICAID

## 2023-04-24 VITALS
WEIGHT: 236 LBS | DIASTOLIC BLOOD PRESSURE: 63 MMHG | BODY MASS INDEX: 37.04 KG/M2 | HEART RATE: 60 BPM | OXYGEN SATURATION: 99 % | HEIGHT: 67 IN | TEMPERATURE: 99 F | RESPIRATION RATE: 18 BRPM | SYSTOLIC BLOOD PRESSURE: 115 MMHG

## 2023-04-24 DIAGNOSIS — J01.91 ACUTE RECURRENT SINUSITIS, UNSPECIFIED LOCATION: Primary | ICD-10-CM

## 2023-04-24 LAB
B-HCG UR QL: NEGATIVE
CTP QC/QA: YES
POC MOLECULAR INFLUENZA A AGN: NEGATIVE
POC MOLECULAR INFLUENZA B AGN: NEGATIVE
SARS-COV-2 RDRP RESP QL NAA+PROBE: NEGATIVE

## 2023-04-24 PROCEDURE — 25000003 PHARM REV CODE 250

## 2023-04-24 PROCEDURE — 81025 URINE PREGNANCY TEST: CPT | Performed by: NURSE PRACTITIONER

## 2023-04-24 PROCEDURE — 99283 EMERGENCY DEPT VISIT LOW MDM: CPT

## 2023-04-24 RX ORDER — PSEUDOEPHEDRINE HCL 120 MG/1
120 TABLET, FILM COATED, EXTENDED RELEASE ORAL DAILY
Qty: 7 TABLET | Refills: 0 | Status: SHIPPED | OUTPATIENT
Start: 2023-04-24 | End: 2023-05-01

## 2023-04-24 RX ORDER — PSEUDOEPHEDRINE HCL 120 MG/1
120 TABLET, FILM COATED, EXTENDED RELEASE ORAL 2 TIMES DAILY
Status: DISCONTINUED | OUTPATIENT
Start: 2023-04-24 | End: 2023-04-24

## 2023-04-24 RX ORDER — FLUTICASONE PROPIONATE 50 MCG
1 SPRAY, SUSPENSION (ML) NASAL 2 TIMES DAILY PRN
Qty: 16 G | Refills: 0 | Status: SHIPPED | OUTPATIENT
Start: 2023-04-24 | End: 2023-06-23

## 2023-04-24 RX ORDER — LORATADINE 10 MG/1
10 TABLET ORAL DAILY
Qty: 7 TABLET | Refills: 0 | Status: SHIPPED | OUTPATIENT
Start: 2023-04-24 | End: 2023-05-01

## 2023-04-24 RX ORDER — KETOROLAC TROMETHAMINE 10 MG/1
10 TABLET, FILM COATED ORAL
Status: COMPLETED | OUTPATIENT
Start: 2023-04-24 | End: 2023-04-24

## 2023-04-24 RX ORDER — PSEUDOEPHEDRINE HCL 120 MG/1
120 TABLET, FILM COATED, EXTENDED RELEASE ORAL 2 TIMES DAILY
Status: DISCONTINUED | OUTPATIENT
Start: 2023-04-24 | End: 2023-04-24 | Stop reason: HOSPADM

## 2023-04-24 RX ADMIN — KETOROLAC TROMETHAMINE 10 MG: 10 TABLET, FILM COATED ORAL at 06:04

## 2023-04-24 RX ADMIN — PSEUDOEPHEDRINE HCL 120 MG: 120 TABLET, FILM COATED, EXTENDED RELEASE ORAL at 06:04

## 2023-04-24 NOTE — ED PROVIDER NOTES
Encounter Date: 4/24/2023       History     Chief Complaint   Patient presents with    Nasal Congestion     Reports HA, sneezing, cough, and nasal congestion onset 2 days ago. Denies other symptoms.      37-year-old female with history of sinusitis presents to the emergency department for evaluation of worsening sinus congestion, pressure, and pain that began 2 days ago. She reports associated sneezing, headache, and ear popping. No relief of symptoms with Tylenol sinus, last taken yesterday evening. She denies recent sick contacts. States she is a . She denies fever, chills, dizziness, light-headedness, vision changes, eye pain, eye itching, eye redness, sore throat, runny nose, cough, SOB, chest pain, abdominal pain, nausea, vomiting, diarrhea, or urinary symptoms.     The history is provided by the patient.   Review of patient's allergies indicates:  No Known Allergies  Past Medical History:   Diagnosis Date    Depression      Past Surgical History:   Procedure Laterality Date    DIAGNOSTIC LAPAROSCOPY N/A 6/18/2022    Procedure: LAPAROSCOPY, DIAGNOSTIC;  Surgeon: Mariposa Kruger MD;  [facility];  Service: OB/GYN;  Laterality: N/A;    LAPAROSCOPIC LYSIS OF ADHESIONS  6/18/2022    Procedure: LYSIS, ADHESIONS, LAPAROSCOPIC;  Surgeon: Mariposa Kruger MD;  Location: [facility];  Service: OB/GYN;;    SALPINGOOPHORECTOMY Right 6/18/2022    Procedure: SALPINGO-OOPHORECTOMY;  Surgeon: Mariposa Kruger MD;  Location: [facility];  Service: OB/GYN;  Laterality: Right;     No family history on file.  Social History     Tobacco Use    Smoking status: Never   Substance Use Topics    Alcohol use: Yes     Comment: occassional    Drug use: No     Review of Systems   Constitutional:  Negative for chills and fever.   HENT:  Positive for congestion, sinus pressure, sinus pain and sneezing. Negative for rhinorrhea and sore throat.         Positive for bilateral ear popping.   Eyes:  Negative for pain, redness  and itching.   Respiratory:  Negative for cough and shortness of breath.    Cardiovascular:  Negative for chest pain.   Gastrointestinal:  Negative for abdominal pain, diarrhea, nausea and vomiting.   Genitourinary:  Negative for dysuria, frequency and urgency.   Musculoskeletal:  Negative for back pain.   Skin:  Negative for rash.   Neurological:  Positive for headaches. Negative for dizziness and light-headedness.   Psychiatric/Behavioral:  Negative for confusion.      Physical Exam     Initial Vitals [04/24/23 1739]   BP Pulse Resp Temp SpO2   116/74 64 20 98.4 °F (36.9 °C) 100 %      MAP       --         Physical Exam    Nursing note and vitals reviewed.  Constitutional: She appears well-developed and well-nourished. No distress.   HENT:   Head: Normocephalic and atraumatic.   Right Ear: Tympanic membrane, external ear and ear canal normal.   Left Ear: Tympanic membrane, external ear and ear canal normal.   Mouth/Throat: Oropharynx is clear and moist.   Swollen nasal turbinates. Tenderness to palpation of frontal sinuses.    Eyes: Conjunctivae and EOM are normal. Right eye exhibits no discharge. Left eye exhibits no discharge.   Neck: Neck supple.   Normal range of motion.  Cardiovascular:  Normal rate, regular rhythm, normal heart sounds and intact distal pulses.           Pulmonary/Chest: Breath sounds normal. No respiratory distress. She has no wheezes. She has no rhonchi. She has no rales.   Musculoskeletal:         General: Normal range of motion.      Cervical back: Normal range of motion and neck supple.     Neurological: She is alert and oriented to person, place, and time. She has normal strength.   Skin: Skin is warm and dry. No rash noted.   Psychiatric: She has a normal mood and affect. Her behavior is normal. Judgment and thought content normal.       ED Course   Procedures  Labs Reviewed   SARS-COV-2 RDRP GENE    Narrative:     This test utilizes isothermal nucleic acid amplification technology to  detect the SARS-CoV-2 RdRp nucleic acid segment. The analytical sensitivity (limit of detection) is 500 copies/swab.     A POSITIVE result is indicative of the presence of SARS-CoV-2 RNA; clinical correlation with patient history and other diagnostic information is necessary to determine patient infection status.    A NEGATIVE result means that SARS-CoV-2 nucleic acids are not present above the limit of detection. A NEGATIVE result should be treated as presumptive. It does not rule out the possibility of COVID-19 and should not be the sole basis for treatment decisions. If COVID-19 is strongly suspected based on clinical and exposure history, re-testing using an alternate molecular assay should be considered.     This test is only for use under the Food and Drug Administration s Emergency Use Authorization (EUA).     Commercial kits are provided by [employer]. Performance characteristics of the EUA have been independently verified by [facility] Department of Pathology and Laboratory Medicine.   _________________________________________________________________   The authorized Fact Sheet for Healthcare Providers and the authorized Fact Sheet for Patients of the ID NOW COVID-19 are available on the FDA website:    https://www.fda.gov/media/755342/download      https://www.fda.gov/media/478744/download       POCT INFLUENZA A/B MOLECULAR   POCT URINE PREGNANCY          Imaging Results    None          Medications   pseudoephedrine 12 hr tablet 120 mg (120 mg Oral Given 4/24/23 1831)   ketorolac tablet 10 mg (10 mg Oral Given 4/24/23 1831)     Medical Decision Making:   Initial Assessment:   Urgent evaluation of 37-year-old female with history of sinusitis for sinus pain, sinus pressure, nasal congestion, headache, and sneezing for 2 days. No relief of symptoms with Tylenol Sinus. Pending COVID and flu tests. Will give Toradol and Sudafed and reassess.   Clinical Tests:   Lab Tests: Reviewed  ED  Management:  On review of labs, COVID and influenza tests are negative. I updated the patient on lab results. Explained to her that symptoms are likely due to viral URI. Will discharge home with Flonase, Sudafed, Claritin. Recommended taking Tylenol and ibuprofen as needed for headaches.  Patient verbalized understanding and agreement with this plan of care.  She was given specific return precautions.  All questions and concerns answered.  She is stable for discharge.    I discussed this case with my supervising physician.                        Clinical Impression:   Final diagnoses:  [J01.91] Acute recurrent sinusitis, unspecified location (Primary)        ED Disposition Condition    Discharge Stable          ED Prescriptions       Medication Sig Dispense Start Date End Date Auth. Provider    pseudoephedrine (SUDAFED 12 HOUR) 120 mg 12 hr tablet Take 1 tablet (120 mg total) by mouth once daily. for 7 days 7 tablet 4/24/2023 5/1/2023 Terence Alberts PA-C    loratadine (CLARITIN) 10 mg tablet Take 1 tablet (10 mg total) by mouth once daily. for 7 days 7 tablet 4/24/2023 5/1/2023 Terence Alberts PA-C    fluticasone propionate (FLONASE) 50 mcg/actuation nasal spray 1 spray (50 mcg total) by Each Nostril route 2 (two) times daily as needed for Rhinitis. 16 g 4/24/2023 6/23/2023 Terence Alberts PA-C          Follow-up Information    None          Terence Alberts PA-C  04/24/23 1845       Terenec Alberts PA-C  04/24/23 1846

## 2023-04-24 NOTE — DISCHARGE INSTRUCTIONS
Please take Tylenol and ibuprofen as needed for headaches. Please take Claritin, Sudafed for sinus symptoms.

## 2023-04-24 NOTE — Clinical Note
"Ofelia "Audelia Ogden was seen and treated in our emergency department on 4/24/2023.  She may return to work on 04/25/2023.       If you have any questions or concerns, please don't hesitate to call.      Rashad LARRY, ED OC, RN    "

## 2023-04-24 NOTE — FIRST PROVIDER EVALUATION
Emergency Department TeleTriage Encounter Note      CHIEF COMPLAINT    Chief Complaint   Patient presents with    Nasal Congestion     Reports HA, sneezing, cough, and nasal congestion onset 2 days ago. Denies other symptoms.        VITAL SIGNS   Initial Vitals [04/24/23 1739]   BP Pulse Resp Temp SpO2   116/74 64 20 98.4 °F (36.9 °C) 100 %      MAP       --            ALLERGIES    Review of patient's allergies indicates:  No Known Allergies    PROVIDER TRIAGE NOTE  This is a teletriage evaluation of a 37 y.o. female presenting to the ED complaining of nasal congestion and cough for two days. Denies fever.     Pt is well-appearing, no distress. Vitals stable.     Initial orders will be placed and care will be transferred to an alternate provider when patient is roomed for a full evaluation. Any additional orders and the final disposition will be determined by that provider.         ORDERS  Labs Reviewed - No data to display    ED Orders (720h ago, onward)      Start Ordered     Status Ordering Provider    04/24/23 1748 04/24/23 1747  POCT COVID-19 Rapid Screening  Once         Ordered MALIK LONDONO    04/24/23 1748 04/24/23 1747  POCT Influenza A/B Molecular  Once         Ordered MALIK LONDONO    04/24/23 1748 04/24/23 1747  POCT urine pregnancy  Once         Ordered MALIK LONDONO              Virtual Visit Note: The provider triage portion of this emergency department evaluation and documentation was performed via [employer], a HIPAA-compliant telemedicine application, in concert with a tele-presenter in the room. A face to face patient evaluation with one of my colleagues will occur once the patient is placed in an emergency department room.      DISCLAIMER: This note was prepared with [employer] voice recognition transcription software. Garbled syntax, mangled pronouns, and other bizarre constructions may be attributed to that software system.

## 2023-04-24 NOTE — ED NOTES
Pt to ED with c/o congestion, cough, H/A x2 days, no relief from s/s. No resp distress noted. NAD noted.

## 2023-05-25 ENCOUNTER — HOSPITAL ENCOUNTER (EMERGENCY)
Facility: OTHER | Age: 38
Discharge: HOME | End: 2023-05-25
Attending: EMERGENCY MEDICINE
Payer: MEDICAID

## 2023-05-25 VITALS
HEART RATE: 70 BPM | RESPIRATION RATE: 20 BRPM | DIASTOLIC BLOOD PRESSURE: 60 MMHG | BODY MASS INDEX: 37.04 KG/M2 | TEMPERATURE: 99 F | SYSTOLIC BLOOD PRESSURE: 150 MMHG | HEIGHT: 67 IN | WEIGHT: 236 LBS | OXYGEN SATURATION: 96 %

## 2023-05-25 DIAGNOSIS — J30.2 SEASONAL ALLERGIC RHINITIS, UNSPECIFIED TRIGGER: Primary | ICD-10-CM

## 2023-05-25 PROCEDURE — 63600175 PHARM REV CODE 636 W HCPCS: Performed by: EMERGENCY MEDICINE

## 2023-05-25 PROCEDURE — 99284 EMERGENCY DEPT VISIT MOD MDM: CPT

## 2023-05-25 RX ORDER — PREDNISONE 20 MG/1
60 TABLET ORAL
Status: COMPLETED | OUTPATIENT
Start: 2023-05-25 | End: 2023-05-25

## 2023-05-25 RX ORDER — PREDNISONE 10 MG/1
10 TABLET ORAL DAILY
Qty: 21 TABLET | Refills: 0 | Status: SHIPPED | OUTPATIENT
Start: 2023-05-25

## 2023-05-25 RX ORDER — CETIRIZINE HYDROCHLORIDE 10 MG/1
10 TABLET ORAL DAILY
Qty: 30 TABLET | Refills: 0 | Status: SHIPPED | OUTPATIENT
Start: 2023-05-25 | End: 2024-05-24

## 2023-05-25 RX ORDER — BENZONATATE 100 MG/1
100 CAPSULE ORAL 3 TIMES DAILY PRN
Qty: 20 CAPSULE | Refills: 0 | Status: SHIPPED | OUTPATIENT
Start: 2023-05-25 | End: 2023-06-04

## 2023-05-25 RX ADMIN — PREDNISONE 60 MG: 20 TABLET ORAL at 06:05

## 2023-05-25 NOTE — ED PROVIDER NOTES
"     Source of History:  The patient    Chief complaint:  Nasal Congestion (Pt presents with c/o congestion for months. Pt reports no relief from nasal spray. )      HPI:  Ofelia Ogden is a 37 y.o. female presenting with complain of nasal congestion seasonal allergies as.  She is ultimately states she is dealt with him since a child.  She is been trying to be seen by her primary care physician but can not see them.  She states most recent colleagues:.  Planes of congestion but no fever.  It has been evaluated in the emergency department prior with no improvement with Claritin Flonase or pseudoephedrine.    This is the extent to the patients complaints today here in the emergency department.    ROS:   See HPI.    Review of patient's allergies indicates:  No Known Allergies    PMH:  As per HPI and below:  Past Medical History:   Diagnosis Date    Depression      Past Surgical History:   Procedure Laterality Date    DIAGNOSTIC LAPAROSCOPY N/A 6/18/2022    Procedure: LAPAROSCOPY, DIAGNOSTIC;  Surgeon: Mariposa Kruger MD;  Location: [facility];  Service: OB/GYN;  Laterality: N/A;    LAPAROSCOPIC LYSIS OF ADHESIONS  6/18/2022    Procedure: LYSIS, ADHESIONS, LAPAROSCOPIC;  Surgeon: Mariposa Kruger MD;  Location: [facility] OR;  Service: OB/GYN;;    SALPINGOOPHORECTOMY Right 6/18/2022    Procedure: SALPINGO-OOPHORECTOMY;  Surgeon: Mariposa Kruger MD;  Location: [facility];  Service: OB/GYN;  Laterality: Right;       Social History     Tobacco Use    Smoking status: Never   Substance Use Topics    Alcohol use: Yes     Comment: occassional    Drug use: No       Physical Exam:    BP (!) 150/60   Pulse 70   Temp 98.5 °F (36.9 °C) (Oral)   Resp 20   Ht 5' 7" (1.702 m)   Wt 107 kg (236 lb)   SpO2 96%   BMI 36.96 kg/m²   Nursing note and vital signs reviewed.  Constitutional: No acute distress.  Nontoxic  Eyes: No conjunctival injection.    ENT:  Swollen nasal turbinates with nasal congestion bilaterally patient of " the maxillary sinuses.  Skin: No rashes seen.    Neuro: alert and oriented x3      MDM/ Differential Dx:  37-year-old female with chronic sinusitis.  She is afebrile.  I do not suspect COVID or pneumonia.  Vital signs are normal.  I have explained to the patient that there is unfortunately not significant amounts emergency department can do referrals to an allergist as well as ET.  Also gave the number to clinic to establish a new provider.  Will give steroids orally as well as recommend Zyrtec and reassured the patient.      No further workup is indicated in the emergency department today.  I updated pt regarding results and I counseled pt regarding supportive care measures.  Diagnosis and treatment plan explained to patient. I have answered all questions and the patient is satisfied with the plan of care. Patient discharged home in stable condition.                    Diagnostic Impression:    1. Seasonal allergic rhinitis, unspecified trigger         ED Disposition Condition    Discharge Stable            ED Prescriptions       Medication Sig Dispense Start Date End Date Auth. Provider    predniSONE (DELTASONE) 10 MG tablet Take 1 tablet (10 mg total) by mouth once daily. Take 4 tabs x 3 days, then take 2 tabs x 3 days, then take 1 tab x 3 days. 21 tablet 5/25/2023 -- Adam El,     cetirizine (ZYRTEC) 10 MG tablet Take 1 tablet (10 mg total) by mouth once daily. 30 tablet 5/25/2023 5/24/2024 Adam El,     benzonatate (TESSALON) 100 MG capsule Take 1 capsule (100 mg total) by mouth 3 (three) times daily as needed for Cough. 20 capsule 5/25/2023 6/4/2023 Adam El, DO          Follow-up Information       Follow up With Specialties Details Why Contact Info    [facility] - Ent Clinic Otolaryngology Call   [address] [facility] [address]  [phone]      [facility] [place] - Primary Care Primary Care Schedule an appointment as soon as possible for a  visit in 2 weeks to establish primary care [address]  [facility] [address]  [phone]             Adam El,   05/25/23 0645

## 2023-05-25 NOTE — ED TRIAGE NOTES
C/o sinus congestion for months, taking flonase with no relief, in the process of switching her PCP

## 2024-01-03 ENCOUNTER — HOSPITAL ENCOUNTER (EMERGENCY)
Facility: OTHER | Age: 39
Discharge: HOME | End: 2024-01-03
Attending: EMERGENCY MEDICINE
Payer: MEDICAID

## 2024-01-03 VITALS
BODY MASS INDEX: 33.67 KG/M2 | SYSTOLIC BLOOD PRESSURE: 111 MMHG | TEMPERATURE: 98 F | OXYGEN SATURATION: 98 % | DIASTOLIC BLOOD PRESSURE: 66 MMHG | RESPIRATION RATE: 16 BRPM | HEART RATE: 70 BPM | WEIGHT: 215 LBS

## 2024-01-03 DIAGNOSIS — R52 BODY ACHES: ICD-10-CM

## 2024-01-03 DIAGNOSIS — H66.92 OTITIS OF LEFT EAR: Primary | ICD-10-CM

## 2024-01-03 DIAGNOSIS — Z20.828 EXPOSURE TO THE FLU: ICD-10-CM

## 2024-01-03 LAB
CTP QC/QA: YES
CTP QC/QA: YES
GROUP A STREP, MOLECULAR: NEGATIVE
POC MOLECULAR INFLUENZA A AGN: NEGATIVE
POC MOLECULAR INFLUENZA B AGN: NEGATIVE
SARS-COV-2 RDRP RESP QL NAA+PROBE: NEGATIVE

## 2024-01-03 PROCEDURE — 87635 SARS-COV-2 COVID-19 AMP PRB: CPT | Performed by: EMERGENCY MEDICINE

## 2024-01-03 PROCEDURE — 87651 STREP A DNA AMP PROBE: CPT | Performed by: EMERGENCY MEDICINE

## 2024-01-03 PROCEDURE — 99284 EMERGENCY DEPT VISIT MOD MDM: CPT

## 2024-01-03 RX ORDER — IBUPROFEN 600 MG/1
600 TABLET ORAL EVERY 6 HOURS PRN
Qty: 20 TABLET | Refills: 0 | Status: SHIPPED | OUTPATIENT
Start: 2024-01-03

## 2024-01-03 RX ORDER — ONDANSETRON 4 MG/1
4 TABLET, ORALLY DISINTEGRATING ORAL EVERY 6 HOURS PRN
Qty: 15 TABLET | Refills: 0 | Status: SHIPPED | OUTPATIENT
Start: 2024-01-03

## 2024-01-03 RX ORDER — AMOXICILLIN 500 MG/1
500 CAPSULE ORAL 3 TIMES DAILY
Qty: 21 CAPSULE | Refills: 0 | Status: SHIPPED | OUTPATIENT
Start: 2024-01-03 | End: 2024-01-10

## 2024-01-03 RX ORDER — ACETAMINOPHEN 500 MG
1000 TABLET ORAL EVERY 6 HOURS PRN
Qty: 50 TABLET | Refills: 0 | Status: SHIPPED | OUTPATIENT
Start: 2024-01-03

## 2024-01-03 RX ORDER — FLUTICASONE PROPIONATE 50 MCG
1 SPRAY, SUSPENSION (ML) NASAL 2 TIMES DAILY PRN
Qty: 15 G | Refills: 0 | Status: SHIPPED | OUTPATIENT
Start: 2024-01-03

## 2024-01-03 NOTE — FIRST PROVIDER EVALUATION
Emergency Department TeleTriage Encounter Note      CHIEF COMPLAINT    Chief Complaint   Patient presents with    multiple complaints     Reports sore throat, seeing black spots, body aches, chills, runny nose, nasal congestion, onset New years Imelda.        VITAL SIGNS   Initial Vitals [01/03/24 1405]   BP Pulse Resp Temp SpO2   111/66 70 16 98.2 °F (36.8 °C) 98 %      MAP       --            ALLERGIES    Review of patient's allergies indicates:  No Known Allergies    PROVIDER TRIAGE NOTE  This is a teletriage evaluation of a 38 y.o. female presenting to the ED complaining of multiple complaints. Patient reports rhinorrhea, congestion, seeing spots, body aches, and sore throat for about 3 days. She took mucinex last night without relief.     Patient is alert and oriented. She speaks in complete sentences. She is sitting upright in the chair in no distress.     Initial orders will be placed and care will be transferred to an alternate provider when patient is roomed for a full evaluation. Any additional orders and the final disposition will be determined by that provider.         ORDERS  Labs Reviewed   GROUP A STREP, MOLECULAR   SARS-COV-2 RDRP GENE   POCT INFLUENZA A/B MOLECULAR       ED Orders (720h ago, onward)      Start Ordered     Status Ordering Provider    01/03/24 1411 01/03/24 1410  POCT Influenza A/B Molecular  Once         Final result JUDE MCLEOD    01/03/24 1410 01/03/24 1410  Group A Strep, Molecular  STAT         Final result JUDE MCLEOD    01/03/24 1410 01/03/24 1410  POCT COVID-19 Rapid Screening  Once         Final result JUDE MCLEOD              Virtual Visit Note: The provider triage portion of this emergency department evaluation and documentation was performed via ubigrate, a HIPAA-compliant telemedicine application, in concert with a tele-presenter in the room. A face to face patient evaluation with one of my colleagues will occur once the patient is placed in an emergency  department room.      DISCLAIMER: This note was prepared with PeerIndex voice recognition transcription software. Garbled syntax, mangled pronouns, and other bizarre constructions may be attributed to that software system.

## 2024-01-03 NOTE — DISCHARGE INSTRUCTIONS
Mrs. Ogden,    Thank you for letting me care for you today! It was nice meeting you, and I hope you feel better soon.   If you would like access to your chart and what was done today please utilize the [facility].   Please come back to [facility] for all of your future medical needs.    Our goal in the emergency department is to always give you outstanding care and exceptional service. You may receive a survey by mail or e-mail in the next week regarding your experience in our ED. We would greatly appreciate you completing and returning the survey. Your feedback provides us with a way to recognize our staff who give very good care and it helps us learn how to improve when your experience was below our aspiration of excellence.     Sincerely,    Hima Gavin MD  Board Certified Emergency Physician

## 2024-01-03 NOTE — Clinical Note
"Ofelia "Audelia Ogden was seen and treated in our emergency department on 1/3/2024.  She may return to work on 01/08/2024.       If you have any questions or concerns, please don't hesitate to call.      Hima Gavin MD"

## 2024-01-03 NOTE — ED PROVIDER NOTES
Encounter Date: 1/3/2024       History     Chief Complaint   Patient presents with    multiple complaints     Reports sore throat, seeing black spots, body aches, chills, runny nose, nasal congestion, onset New years Imelda.      38-year-old female presenting for evaluation of congestion, body aches, subjective fevers over last few days.  Notes to flu exposures at home, however has taken NyQuil and Mucinex with only minor improvement in her symptoms.  She continued to feel unwell today which prompted her to come the emergency department.  She denies any difficulty breathing.      Review of patient's allergies indicates:  No Known Allergies  Past Medical History:   Diagnosis Date    Depression      Past Surgical History:   Procedure Laterality Date    DIAGNOSTIC LAPAROSCOPY N/A 6/18/2022    Procedure: LAPAROSCOPY, DIAGNOSTIC;  Surgeon: Mariposa Kruger MD;  Location: [facility];  Service: OB/GYN;  Laterality: N/A;    LAPAROSCOPIC LYSIS OF ADHESIONS  6/18/2022    Procedure: LYSIS, ADHESIONS, LAPAROSCOPIC;  Surgeon: Mariposa Kruger MD;  Location: [facility];  Service: OB/GYN;;    SALPINGOOPHORECTOMY Right 6/18/2022    Procedure: SALPINGO-OOPHORECTOMY;  Surgeon: Mariposa Kruger MD;  Location: [facility];  Service: OB/GYN;  Laterality: Right;     No family history on file.  Social History     Tobacco Use    Smoking status: Never   Substance Use Topics    Alcohol use: Yes     Comment: occassional    Drug use: No     Review of Systems  Constitutional-positive fever  HEENT-positive congestion  Eyes-no redness  Respiratory-no shortness of breath  Cardio-no chest pain  GI-no abdominal pain  Endocrine-no cold intolerance  -no difficulty urinating  MSK-positive myalgias  Skin-no rashes  Allergy-no environmental allergy  Neurologic-, no headache  Hematology-no swollen nodes  Behavioral-no confusion  Physical Exam     Initial Vitals [01/03/24 1405]   BP Pulse Resp Temp SpO2   111/66 70 16 98.2 °F (36.8 °C) 98 %      MAP        --         Physical Exam  Constitutional: Well appearing, no distress.  Eyes: Conjunctivae normal.  ENT       Head: Normocephalic, atraumatic.       Nose: Normal external appearance        Mouth/Throat: no strigulous respirations   Hematological/Lymphatic/Immunilogical: no visible lymphadenopathy   Cardiovascular: Normal rate,   Respiratory: Normal respiratory effort.   Gastrointestinal: non distended   Musculoskeletal: Normal range of motion in all extremities. No obvious deformities or swelling.  Neurologic: Alert, oriented. Normal speech and language. No gross focal neurologic deficits are appreciated.  Skin: Skin is warm, dry. No rash noted.  Psychiatric: Mood and affect are normal.   ED Course   Procedures  Labs Reviewed   GROUP A STREP, MOLECULAR   SARS-COV-2 RDRP GENE   POCT INFLUENZA A/B MOLECULAR          Imaging Results    None          Medications - No data to display  Medical Decision Making  Ddx- flu, covid, pneumonia, flu like illness, otitis, sinusitis    Flu covid neg.  Stable on rm air.  Fluid in left TM.  Plan for abx. Symptom care.  OP f/u return in case of worsening     Problems Addressed:  Body aches: acute illness or injury  Exposure to the flu: acute illness or injury  Otitis of left ear: acute illness or injury    Amount and/or Complexity of Data Reviewed  Labs: ordered. Decision-making details documented in ED Course.    Risk  OTC drugs.  Prescription drug management.                                      Clinical Impression:  Final diagnoses:  [H66.92] Otitis of left ear (Primary)  [Z20.828] Exposure to the flu  [R52] Body aches          ED Disposition Condition    Discharge Stable          ED Prescriptions       Medication Sig Dispense Start Date End Date Auth. Provider    fluticasone propionate (FLONASE) 50 mcg/actuation nasal spray 1 spray (50 mcg total) by Each Nostril route 2 (two) times daily as needed for Rhinitis. 15 g 1/3/2024 -- Hima Gavin MD    ondansetron  (ZOFRAN-ODT) 4 MG TbDL Take 1 tablet (4 mg total) by mouth every 6 (six) hours as needed. 15 tablet 1/3/2024 -- Hima Gavin MD    amoxicillin (AMOXIL) 500 MG capsule Take 1 capsule (500 mg total) by mouth 3 (three) times daily. for 7 days 21 capsule 1/3/2024 1/10/2024 Hima Gavin MD    ibuprofen (ADVIL,MOTRIN) 600 MG tablet Take 1 tablet (600 mg total) by mouth every 6 (six) hours as needed for Pain. 20 tablet 1/3/2024 -- Hima Gavin MD    acetaminophen (TYLENOL) 500 MG tablet Take 2 tablets (1,000 mg total) by mouth every 6 (six) hours as needed. 50 tablet 1/3/2024 -- Hima Gavin MD          Follow-up Information       Follow up With Specialties Details Why Contact Info    Gilda Jackson MD Internal Medicine Call in 1 day If symptoms worsen, For a follow up visit about today [facility] [address] [address]  [phone]               Hima Gavin MD  01/03/24 0207

## 2024-01-03 NOTE — ED TRIAGE NOTES
Cough and congestion with fever and body aches since 12/31. Taking OTC cold meds with minimal relief. + subjective fever. Presents in no distress.

## 2024-04-21 ENCOUNTER — HOSPITAL ENCOUNTER (EMERGENCY)
Facility: OTHER | Age: 39
Discharge: HOME | End: 2024-04-21
Attending: EMERGENCY MEDICINE
Payer: MEDICAID

## 2024-04-21 VITALS
HEART RATE: 67 BPM | RESPIRATION RATE: 18 BRPM | TEMPERATURE: 99 F | SYSTOLIC BLOOD PRESSURE: 116 MMHG | OXYGEN SATURATION: 100 % | DIASTOLIC BLOOD PRESSURE: 77 MMHG

## 2024-04-21 DIAGNOSIS — R11.2 NAUSEA AND VOMITING, UNSPECIFIED VOMITING TYPE: ICD-10-CM

## 2024-04-21 DIAGNOSIS — B34.9 ACUTE VIRAL SYNDROME: Primary | ICD-10-CM

## 2024-04-21 LAB
ALBUMIN SERPL BCP-MCNC: 3.9 G/DL (ref 3.5–5.2)
ALP SERPL-CCNC: 52 U/L (ref 55–135)
ALT SERPL W/O P-5'-P-CCNC: 24 U/L (ref 10–44)
ANION GAP SERPL CALC-SCNC: 12 MMOL/L (ref 8–16)
AST SERPL-CCNC: 24 U/L (ref 10–40)
BACTERIA #/AREA URNS HPF: ABNORMAL /HPF
BASOPHILS # BLD AUTO: 0.04 K/UL (ref 0–0.2)
BASOPHILS NFR BLD: 0.7 % (ref 0–1.9)
BILIRUB SERPL-MCNC: 0.9 MG/DL (ref 0.1–1)
BILIRUB UR QL STRIP: NEGATIVE
BUN SERPL-MCNC: 11 MG/DL (ref 6–20)
CALCIUM SERPL-MCNC: 8.4 MG/DL (ref 8.7–10.5)
CHLORIDE SERPL-SCNC: 106 MMOL/L (ref 95–110)
CLARITY UR: CLEAR
CO2 SERPL-SCNC: 20 MMOL/L (ref 23–29)
COLOR UR: YELLOW
CREAT SERPL-MCNC: 0.8 MG/DL (ref 0.5–1.4)
CTP QC/QA: YES
CTP QC/QA: YES
DIFFERENTIAL METHOD BLD: ABNORMAL
EOSINOPHIL # BLD AUTO: 0.2 K/UL (ref 0–0.5)
EOSINOPHIL NFR BLD: 4.3 % (ref 0–8)
ERYTHROCYTE [DISTWIDTH] IN BLOOD BY AUTOMATED COUNT: 14.2 % (ref 11.5–14.5)
EST. GFR  (NO RACE VARIABLE): >60 ML/MIN/1.73 M^2
GLUCOSE SERPL-MCNC: 91 MG/DL (ref 70–110)
GLUCOSE UR QL STRIP: NEGATIVE
HCT VFR BLD AUTO: 34.4 % (ref 37–48.5)
HCV AB SERPL QL IA: NEGATIVE
HGB BLD-MCNC: 11.7 G/DL (ref 12–16)
HGB UR QL STRIP: ABNORMAL
HIV 1+2 AB+HIV1 P24 AG SERPL QL IA: NEGATIVE
IMM GRANULOCYTES # BLD AUTO: 0.03 K/UL (ref 0–0.04)
IMM GRANULOCYTES NFR BLD AUTO: 0.5 % (ref 0–0.5)
KETONES UR QL STRIP: NEGATIVE
LEUKOCYTE ESTERASE UR QL STRIP: NEGATIVE
LIPASE SERPL-CCNC: 56 U/L (ref 4–60)
LYMPHOCYTES # BLD AUTO: 2.1 K/UL (ref 1–4.8)
LYMPHOCYTES NFR BLD: 37.8 % (ref 18–48)
MCH RBC QN AUTO: 32.4 PG (ref 27–31)
MCHC RBC AUTO-ENTMCNC: 34 G/DL (ref 32–36)
MCV RBC AUTO: 95 FL (ref 82–98)
MICROSCOPIC COMMENT: ABNORMAL
MONOCYTES # BLD AUTO: 0.7 K/UL (ref 0.3–1)
MONOCYTES NFR BLD: 13.2 % (ref 4–15)
NEUTROPHILS # BLD AUTO: 2.4 K/UL (ref 1.8–7.7)
NEUTROPHILS NFR BLD: 43.5 % (ref 38–73)
NITRITE UR QL STRIP: NEGATIVE
NRBC BLD-RTO: 0 /100 WBC
PH UR STRIP: 6 [PH] (ref 5–8)
PLATELET # BLD AUTO: 256 K/UL (ref 150–450)
PMV BLD AUTO: 9.4 FL (ref 9.2–12.9)
POC MOLECULAR INFLUENZA A AGN: NEGATIVE
POC MOLECULAR INFLUENZA B AGN: NEGATIVE
POTASSIUM SERPL-SCNC: 3.3 MMOL/L (ref 3.5–5.1)
PROT SERPL-MCNC: 7.4 G/DL (ref 6–8.4)
PROT UR QL STRIP: ABNORMAL
RBC # BLD AUTO: 3.61 M/UL (ref 4–5.4)
RBC #/AREA URNS HPF: 1 /HPF (ref 0–4)
SARS-COV-2 RDRP RESP QL NAA+PROBE: NEGATIVE
SODIUM SERPL-SCNC: 138 MMOL/L (ref 136–145)
SP GR UR STRIP: 1.02 (ref 1–1.03)
SQUAMOUS #/AREA URNS HPF: 2 /HPF
URN SPEC COLLECT METH UR: ABNORMAL
UROBILINOGEN UR STRIP-ACNC: NEGATIVE EU/DL
WBC # BLD AUTO: 5.53 K/UL (ref 3.9–12.7)
WBC #/AREA URNS HPF: 3 /HPF (ref 0–5)

## 2024-04-21 PROCEDURE — 63600175 PHARM REV CODE 636 W HCPCS: Performed by: PHYSICIAN ASSISTANT

## 2024-04-21 PROCEDURE — 96361 HYDRATE IV INFUSION ADD-ON: CPT

## 2024-04-21 PROCEDURE — 25000003 PHARM REV CODE 250: Performed by: PHYSICIAN ASSISTANT

## 2024-04-21 PROCEDURE — 80053 COMPREHEN METABOLIC PANEL: CPT | Performed by: PHYSICIAN ASSISTANT

## 2024-04-21 PROCEDURE — 87635 SARS-COV-2 COVID-19 AMP PRB: CPT | Performed by: PHYSICIAN ASSISTANT

## 2024-04-21 PROCEDURE — 85025 COMPLETE CBC W/AUTO DIFF WBC: CPT | Performed by: EMERGENCY MEDICINE

## 2024-04-21 PROCEDURE — 86803 HEPATITIS C AB TEST: CPT | Performed by: EMERGENCY MEDICINE

## 2024-04-21 PROCEDURE — 99284 EMERGENCY DEPT VISIT MOD MDM: CPT | Mod: 25

## 2024-04-21 PROCEDURE — 83690 ASSAY OF LIPASE: CPT | Performed by: PHYSICIAN ASSISTANT

## 2024-04-21 PROCEDURE — 87389 HIV-1 AG W/HIV-1&-2 AB AG IA: CPT | Performed by: EMERGENCY MEDICINE

## 2024-04-21 PROCEDURE — 96365 THER/PROPH/DIAG IV INF INIT: CPT

## 2024-04-21 PROCEDURE — 81000 URINALYSIS NONAUTO W/SCOPE: CPT | Performed by: PHYSICIAN ASSISTANT

## 2024-04-21 RX ORDER — POTASSIUM CHLORIDE 750 MG/1
10 TABLET, EXTENDED RELEASE ORAL DAILY
Qty: 5 TABLET | Refills: 0 | Status: SHIPPED | OUTPATIENT
Start: 2024-04-21 | End: 2024-04-26

## 2024-04-21 RX ORDER — ONDANSETRON 4 MG/1
4 TABLET, ORALLY DISINTEGRATING ORAL EVERY 6 HOURS PRN
Qty: 12 TABLET | Refills: 0 | Status: SHIPPED | OUTPATIENT
Start: 2024-04-21

## 2024-04-21 RX ORDER — POTASSIUM CHLORIDE 20 MEQ/1
40 TABLET, EXTENDED RELEASE ORAL
Status: DISCONTINUED | OUTPATIENT
Start: 2024-04-21 | End: 2024-04-21 | Stop reason: HOSPADM

## 2024-04-21 RX ADMIN — SODIUM CHLORIDE 1000 ML: 9 INJECTION, SOLUTION INTRAVENOUS at 02:04

## 2024-04-21 RX ADMIN — PROMETHAZINE HYDROCHLORIDE 12.5 MG: 25 INJECTION INTRAMUSCULAR; INTRAVENOUS at 04:04

## 2024-04-21 NOTE — ED TRIAGE NOTES
Ofelia Ogden, an 38 y.o. female presents to the ED complaining of head pressure, dizziness when standing up, generalized body pain, nausea, and vomiting. Received Zofran from EMS before arrival that relieved the nausea.She was throwing up at work and coworker called EMS. AAOx4. Symptoms began last night.      Chief Complaint   Patient presents with    Chills    Vomiting     Chill, n/v, generalized body aches, and lightheadedness since last night.  Denies any diarrhea or abd pain     Review of patient's allergies indicates:  No Known Allergies  Past Medical History:   Diagnosis Date    Depression

## 2024-04-21 NOTE — Clinical Note
"Ofelia"Audelia Ogden was seen and treated in our emergency department on 4/21/2024.  She may return to work on 04/23/2024.       If you have any questions or concerns, please don't hesitate to call.      Carmen AU    "

## 2024-04-21 NOTE — ED PROVIDER NOTES
Encounter Date: 4/21/2024       History     Chief Complaint   Patient presents with    Chills    Vomiting     Chill, n/v, generalized body aches, and lightheadedness since last night.  Denies any diarrhea or abd pain     38 year old female presents with chills and vomititng. Symptoms started last night with vomiting. Has had multiple episodes since last night. Went to work, developed chills and had persistent vomiting therefore coworkers called the ambulance.  Patient reports headache and stuffy nose.  No abdominal cramping or pain.  No flank pain or UTI symptoms.  Denies any diarrhea.  No cough.  Denies any recent antibiotic use.    The history is provided by the patient.     Review of patient's allergies indicates:  No Known Allergies  Past Medical History:   Diagnosis Date    Depression      Past Surgical History:   Procedure Laterality Date    DIAGNOSTIC LAPAROSCOPY N/A 6/18/2022    Procedure: LAPAROSCOPY, DIAGNOSTIC;  Surgeon: Mariposa Kruger MD;  Location: [facility];  Service: OB/GYN;  Laterality: N/A;    LAPAROSCOPIC LYSIS OF ADHESIONS  6/18/2022    Procedure: LYSIS, ADHESIONS, LAPAROSCOPIC;  Surgeon: Mariposa Kruger MD;  Location: [facility];  Service: OB/GYN;;    SALPINGOOPHORECTOMY Right 6/18/2022    Procedure: SALPINGO-OOPHORECTOMY;  Surgeon: Mariposa Kruger MD;  Location: [facility];  Service: OB/GYN;  Laterality: Right;     No family history on file.  Social History     Tobacco Use    Smoking status: Never   Substance Use Topics    Alcohol use: Yes     Comment: occassional    Drug use: No     Review of Systems   Constitutional:  Negative for chills and fever.   Eyes:  Negative for visual disturbance.   Respiratory:  Negative for shortness of breath.    Cardiovascular:  Negative for chest pain.   Gastrointestinal:  Negative for nausea and vomiting.   Genitourinary:  Negative for dysuria and flank pain.   Musculoskeletal:  Negative for myalgias.   Skin:  Negative for rash.   Allergic/Immunologic:  Negative for immunocompromised state.   Neurological:  Negative for weakness and numbness.   Hematological:  Does not bruise/bleed easily.   Psychiatric/Behavioral:  Negative for confusion.        Physical Exam     Initial Vitals [04/21/24 1353]   BP Pulse Resp Temp SpO2   116/68 65 18 98 °F (36.7 °C) 100 %      MAP       --         Physical Exam    Vitals reviewed.  Constitutional: She appears well-developed and well-nourished. She is not diaphoretic. No distress.   HENT:   Head: Normocephalic and atraumatic.   Mouth/Throat: Oropharynx is clear and moist.   Eyes: Conjunctivae and EOM are normal. Pupils are equal, round, and reactive to light.   Neck: Neck supple.   Cardiovascular:  Normal rate, regular rhythm, normal heart sounds and intact distal pulses.           Pulmonary/Chest: Breath sounds normal. No respiratory distress.   Abdominal: Abdomen is soft. She exhibits no distension. There is no abdominal tenderness. There is no rebound and no guarding.   Musculoskeletal:         General: Normal range of motion.      Cervical back: Neck supple.     Neurological: She is alert and oriented to person, place, and time.   Skin: Skin is warm.         ED Course   Procedures  Labs Reviewed   URINALYSIS, REFLEX TO URINE CULTURE - Abnormal; Notable for the following components:       Result Value    Protein, UA Trace (*)     Occult Blood UA 3+ (*)     All other components within normal limits    Narrative:     Specimen Source->Urine   COMPREHENSIVE METABOLIC PANEL - Abnormal; Notable for the following components:    Potassium 3.3 (*)     CO2 20 (*)     Calcium 8.4 (*)     Alkaline Phosphatase 52 (*)     All other components within normal limits   CBC W/ AUTO DIFFERENTIAL - Abnormal; Notable for the following components:    RBC 3.61 (*)     Hemoglobin 11.7 (*)     Hematocrit 34.4 (*)     MCH 32.4 (*)     All other components within normal limits   URINALYSIS MICROSCOPIC - Abnormal; Notable for the following components:     Bacteria Many (*)     All other components within normal limits    Narrative:     Specimen Source->Urine   HIV 1 / 2 ANTIBODY    Narrative:     Release to patient->Immediate   HEPATITIS C ANTIBODY    Narrative:     Release to patient->Immediate   LIPASE   SARS-COV-2 RDRP GENE   POCT INFLUENZA A/B MOLECULAR          Imaging Results    None          Medications   sodium chloride 0.9% bolus 1,000 mL 1,000 mL (0 mLs Intravenous Stopped 4/21/24 1737)   promethazine (PHENERGAN) 12.5 mg in dextrose 5 % (D5W) 50 mL IVPB (0 mg Intravenous Stopped 4/21/24 1737)     Medical Decision Making  Amount and/or Complexity of Data Reviewed  Labs: ordered.    Risk  Prescription drug management.               ED Course as of 04/21/24 2033   Sun Apr 21, 2024   1541 Influenza, COVID negative.    Chemistries reveal potassium of 3.3, LFTs unremarkable.  Kidney function within normal limits.  Normal white count of 5.5.  Hemoglobin stable. Lipase unremarkable. [AJ]   1603 Patient had 1 episode of vomiting when given oral potassium.  Will give additional Phenergan. [AJ]   2033 On reassessment patient is resting comfortably.  Has had improvement of the nausea.    Prescribed home on potassium and Zofran.  Suspect viral etiology, viral syndrome.  Will advise to have a bland diet and slowly progress diet as tolerated.  Serial abdominal exams have been benign.  Will give strict return precautions ED which patient is agreeable to.  She is otherwise stable for discharge and close follow-up. [AJ]      ED Course User Index  [AJ] Miley Rutherford PA-C                           Clinical Impression:  Final diagnoses:  [B34.9] Acute viral syndrome (Primary)  [R11.2] Nausea and vomiting, unspecified vomiting type          ED Disposition Condition    Discharge Stable          ED Prescriptions       Medication Sig Dispense Start Date End Date Auth. Provider    ondansetron (ZOFRAN-ODT) 4 MG TbDL Take 1 tablet (4 mg total) by mouth every 6 (six) hours as  needed. 12 tablet 4/21/2024 -- Miley Rutherford PA-C    potassium chloride (KLOR-CON) 10 MEQ TbSR Take 1 tablet (10 mEq total) by mouth once daily. for 5 days 5 tablet 4/21/2024 4/26/2024 Miley Rutherford PA-C          Follow-up Information       Follow up With Specialties Details Why Contact Info    Gilda Jackson MD Internal Medicine   [address] [place] [address]  [phone]               Miley Rutherford PA-C  04/21/24 2033

## 2024-04-21 NOTE — DISCHARGE INSTRUCTIONS
Take medication as prescribed.   Have a bland diet and slowly progress diet as tolerated.  I suspect that your symptoms are due to a virus.  Increase handwashing.  Return to the ER with concerning or worsening symptoms

## 2024-08-08 ENCOUNTER — HOSPITAL ENCOUNTER (EMERGENCY)
Facility: OTHER | Age: 39
Discharge: HOME | End: 2024-08-08
Attending: EMERGENCY MEDICINE
Payer: MEDICAID

## 2024-08-08 VITALS
OXYGEN SATURATION: 100 % | SYSTOLIC BLOOD PRESSURE: 131 MMHG | RESPIRATION RATE: 16 BRPM | WEIGHT: 209 LBS | BODY MASS INDEX: 31.67 KG/M2 | HEART RATE: 67 BPM | HEIGHT: 68 IN | TEMPERATURE: 98 F | DIASTOLIC BLOOD PRESSURE: 88 MMHG

## 2024-08-08 DIAGNOSIS — R07.89 CHEST WALL PAIN: Primary | ICD-10-CM

## 2024-08-08 DIAGNOSIS — R20.2 PARESTHESIAS: ICD-10-CM

## 2024-08-08 DIAGNOSIS — R07.9 CHEST PAIN: ICD-10-CM

## 2024-08-08 LAB
ALBUMIN SERPL BCP-MCNC: 3.7 G/DL (ref 3.5–5.2)
ALP SERPL-CCNC: 45 U/L (ref 55–135)
ALT SERPL W/O P-5'-P-CCNC: 18 U/L (ref 10–44)
ANION GAP SERPL CALC-SCNC: 11 MMOL/L (ref 8–16)
AST SERPL-CCNC: 21 U/L (ref 10–40)
BASOPHILS # BLD AUTO: 0.04 K/UL (ref 0–0.2)
BASOPHILS NFR BLD: 1.1 % (ref 0–1.9)
BILIRUB SERPL-MCNC: 0.5 MG/DL (ref 0.1–1)
BUN SERPL-MCNC: 6 MG/DL (ref 6–20)
CALCIUM SERPL-MCNC: 8.8 MG/DL (ref 8.7–10.5)
CHLORIDE SERPL-SCNC: 109 MMOL/L (ref 95–110)
CO2 SERPL-SCNC: 19 MMOL/L (ref 23–29)
CREAT SERPL-MCNC: 0.7 MG/DL (ref 0.5–1.4)
DIFFERENTIAL METHOD BLD: ABNORMAL
EOSINOPHIL # BLD AUTO: 0.1 K/UL (ref 0–0.5)
EOSINOPHIL NFR BLD: 3 % (ref 0–8)
ERYTHROCYTE [DISTWIDTH] IN BLOOD BY AUTOMATED COUNT: 13.3 % (ref 11.5–14.5)
EST. GFR  (NO RACE VARIABLE): >60 ML/MIN/1.73 M^2
GLUCOSE SERPL-MCNC: 90 MG/DL (ref 70–110)
HCT VFR BLD AUTO: 34.5 % (ref 37–48.5)
HGB BLD-MCNC: 11.5 G/DL (ref 12–16)
IMM GRANULOCYTES # BLD AUTO: 0.01 K/UL (ref 0–0.04)
IMM GRANULOCYTES NFR BLD AUTO: 0.3 % (ref 0–0.5)
LYMPHOCYTES # BLD AUTO: 1.6 K/UL (ref 1–4.8)
LYMPHOCYTES NFR BLD: 43.3 % (ref 18–48)
MAGNESIUM SERPL-MCNC: 1.9 MG/DL (ref 1.6–2.6)
MCH RBC QN AUTO: 31.2 PG (ref 27–31)
MCHC RBC AUTO-ENTMCNC: 33.3 G/DL (ref 32–36)
MCV RBC AUTO: 94 FL (ref 82–98)
MONOCYTES # BLD AUTO: 0.3 K/UL (ref 0.3–1)
MONOCYTES NFR BLD: 8.5 % (ref 4–15)
NEUTROPHILS # BLD AUTO: 1.6 K/UL (ref 1.8–7.7)
NEUTROPHILS NFR BLD: 43.8 % (ref 38–73)
NRBC BLD-RTO: 0 /100 WBC
PLATELET # BLD AUTO: 324 K/UL (ref 150–450)
PMV BLD AUTO: 9.8 FL (ref 9.2–12.9)
POTASSIUM SERPL-SCNC: 3.9 MMOL/L (ref 3.5–5.1)
PROT SERPL-MCNC: 7 G/DL (ref 6–8.4)
RBC # BLD AUTO: 3.69 M/UL (ref 4–5.4)
SODIUM SERPL-SCNC: 139 MMOL/L (ref 136–145)
TROPONIN I SERPL DL<=0.01 NG/ML-MCNC: <0.006 NG/ML (ref 0–0.03)
TSH SERPL DL<=0.005 MIU/L-ACNC: 1.63 UIU/ML (ref 0.4–4)
WBC # BLD AUTO: 3.65 K/UL (ref 3.9–12.7)

## 2024-08-08 PROCEDURE — 85025 COMPLETE CBC W/AUTO DIFF WBC: CPT | Performed by: EMERGENCY MEDICINE

## 2024-08-08 PROCEDURE — 25000003 PHARM REV CODE 250: Performed by: EMERGENCY MEDICINE

## 2024-08-08 PROCEDURE — 93005 ELECTROCARDIOGRAM TRACING: CPT

## 2024-08-08 PROCEDURE — 84443 ASSAY THYROID STIM HORMONE: CPT | Performed by: EMERGENCY MEDICINE

## 2024-08-08 PROCEDURE — 84484 ASSAY OF TROPONIN QUANT: CPT | Performed by: EMERGENCY MEDICINE

## 2024-08-08 PROCEDURE — 93010 ELECTROCARDIOGRAM REPORT: CPT | Mod: ,,, | Performed by: INTERNAL MEDICINE

## 2024-08-08 PROCEDURE — 99285 EMERGENCY DEPT VISIT HI MDM: CPT | Mod: 25

## 2024-08-08 PROCEDURE — 80053 COMPREHEN METABOLIC PANEL: CPT | Performed by: EMERGENCY MEDICINE

## 2024-08-08 PROCEDURE — 36415 COLL VENOUS BLD VENIPUNCTURE: CPT | Performed by: EMERGENCY MEDICINE

## 2024-08-08 PROCEDURE — 83735 ASSAY OF MAGNESIUM: CPT | Performed by: EMERGENCY MEDICINE

## 2024-08-08 RX ORDER — NAPROXEN 500 MG/1
500 TABLET ORAL
Status: COMPLETED | OUTPATIENT
Start: 2024-08-08 | End: 2024-08-08

## 2024-08-08 RX ORDER — METHOCARBAMOL 750 MG/1
1500 TABLET, FILM COATED ORAL
Status: COMPLETED | OUTPATIENT
Start: 2024-08-08 | End: 2024-08-08

## 2024-08-08 RX ORDER — NAPROXEN 500 MG/1
500 TABLET ORAL 2 TIMES DAILY PRN
Qty: 28 TABLET | Refills: 0 | Status: SHIPPED | OUTPATIENT
Start: 2024-08-08 | End: 2024-08-22

## 2024-08-08 RX ORDER — LIDOCAINE 50 MG/G
PATCH TOPICAL
Qty: 15 PATCH | Refills: 1 | Status: SHIPPED | OUTPATIENT
Start: 2024-08-08

## 2024-08-08 RX ORDER — METHOCARBAMOL 750 MG/1
1500 TABLET, FILM COATED ORAL 3 TIMES DAILY PRN
Qty: 30 TABLET | Refills: 0 | Status: SHIPPED | OUTPATIENT
Start: 2024-08-08 | End: 2024-08-13

## 2024-08-08 RX ORDER — DICLOFENAC SODIUM 10 MG/G
2 GEL TOPICAL 3 TIMES DAILY PRN
Qty: 100 G | Refills: 0 | Status: SHIPPED | OUTPATIENT
Start: 2024-08-08

## 2024-08-08 RX ORDER — DIAZEPAM 5 MG/1
5 TABLET ORAL
Status: COMPLETED | OUTPATIENT
Start: 2024-08-08 | End: 2024-08-08

## 2024-08-08 RX ORDER — ACETAMINOPHEN 325 MG/1
650 TABLET ORAL
Status: COMPLETED | OUTPATIENT
Start: 2024-08-08 | End: 2024-08-08

## 2024-08-08 RX ADMIN — METHOCARBAMOL 1500 MG: 750 TABLET ORAL at 06:08

## 2024-08-08 RX ADMIN — NAPROXEN 500 MG: 500 TABLET ORAL at 06:08

## 2024-08-08 RX ADMIN — DIAZEPAM 5 MG: 5 TABLET ORAL at 07:08

## 2024-08-08 RX ADMIN — ACETAMINOPHEN 650 MG: 325 TABLET, FILM COATED ORAL at 07:08

## 2024-08-08 NOTE — ED PROVIDER NOTES
"  Source of History:  Medical record, patient      Chief complaint:  Per triage note: "Chest Pain (Pt arrives to the Ed today via EMS w/ complaint of chest pain the radiates to the left arm and leg (pt states that upon movement the pain gets worse). Pt called 911 d/t no being able to get up out of the shower b/c the pain was too severe. Per EMS 12-lead unremarkable, VSS. Pt denies N/V, or SOB. )  "    HPI:    Patient presents to the ED via EMS with complaint of chest wall pain, onset at 1630 hours radiating to her left arm, associated with pins and needles throughout the same arm. This discomfort is exacerbated when lifting her arm, certain positions, movement, and yawning. She notes it is worse with deep inspiration as well. She reports an episode of diaphoresis earlier today. She also notes left foot pain and paresthesias. She denies heavy lifting, unusual activity, trauma.    ROS:   See HPI for pertinent Review of Systems      Review of patient's allergies indicates:  No Known Allergies    PMH:  As per HPI and below:  Past Medical History:   Diagnosis Date    Depression        Past Surgical History:   Procedure Laterality Date    DIAGNOSTIC LAPAROSCOPY N/A 6/18/2022    Procedure: LAPAROSCOPY, DIAGNOSTIC;  Surgeon: Mariposa Kruger MD;  Location: [facility];  Service: OB/GYN;  Laterality: N/A;    LAPAROSCOPIC LYSIS OF ADHESIONS  6/18/2022    Procedure: LYSIS, ADHESIONS, LAPAROSCOPIC;  Surgeon: Mariposa Kruger MD;  Location: [facility] OR;  Service: OB/GYN;;    SALPINGOOPHORECTOMY Right 6/18/2022    Procedure: SALPINGO-OOPHORECTOMY;  Surgeon: Mariposa Kruger MD;  Location: [facility];  Service: OB/GYN;  Laterality: Right;       Social History     Tobacco Use    Smoking status: Never   Substance Use Topics    Alcohol use: Yes     Comment: occassional    Drug use: No       Physical Exam:      Nursing note and vitals reviewed.  /88 (BP Location: Right arm)   Pulse 67   Temp 98.3 °F (36.8 °C) (Oral)   Resp " "16   Ht 5' 8" (1.727 m)   Wt 94.8 kg (209 lb)   SpO2 100%   Breastfeeding No   BMI 31.78 kg/m²     Constitutional: No distress.  Eyes: EOMI. No discharge. Anicteric.  HENT:   Neck: Normal range of motion. Neck supple.  Left paraspinal tenderness spasm.  No midline spinal tenderness, step-off, or deformity.  Cardiovascular: Normal rate. No murmur, no gallop and no friction rub heard.   Pulmonary/Chest: No respiratory distress. Effort normal. No wheezes, no rales, no rhonchi.   Abdominal: Bowel sounds normal. Soft. No distension and no mass. There is no tenderness. There is no rebound, no guarding, no tenderness at McBurney's point.  Neurological: GCS 15. Alert and oriented to person, place, and time. No gross cranial nerve, light touch or strength deficit. Coordination normal.   Skin: Skin is warm and dry.   EXT: 2+ radial pulses.         Medical Decision Making / Independent Interpretations / External Records Reviewed:      Patient is a 38-year-old female with history of depression who presents with left chest wall pain worse with certain movements, positions, yawning since 1630 hours.  She denies any trauma, heavy lifting.  Patient denies radiation to both arms, associated vomiting, worsening with exertion, pre/syncope, which would increase the pretest probability of ACS/MI.  Denies travel/immobility, recent procedures / admissions, cough, hemoptysis, LE edema or pain, or fevers.  Low pretest probability of acute pulmonary embolus.  The initial differential included musculoskeletal pain, acute coronary syndrome, pneumonia, cervical radiculopathy.         ED Course as of 08/08/24 2024   Thu Aug 08, 2024   1811   --  EKG Interpretation: Sinus bradycardia rhythm at 57 beats per minute, no STEMI, no significant acute ST/T abnormalities, normal intervals.    No acute change compared to prior tracing.  --       [RC]   1840 I independently interpreted CXR which shows no pneumothorax, no focal consolidation, no " cardiomegaly, no acute process.   [RC]   2019 I independently interpreted labs which are unremarkable / unrevealing.   Patient reported significant relief after initial dose of orphenadrine and naproxen, however still had significant pain and discomfort, repeatedly complained that the pain was worse when she yawns.  On reassessment after additional dose of diazepam and acetaminophen, patient had further relief.  She felt comfortable with discharge.    --  I discussed with pt and/or guardian/caretaker that this evaluation in the ED does not suggest any emergent or life threatening medical condition requiring admission or further immediate intervention or diagnostics. Regardless, an unremarkable evaluation in the ED does not preclude the development or presence of a serious or life threatening condition. Pt was instructed to return for any worsening, new, changed, or concerning symptoms.     I had a detailed discussion with patient and/or guardian/caretaker regarding findings, plan, return precautions, importance of medication adherence, need to follow-up with a PCP and specialist. All questions answered.     Note was created using voice recognition software. It may have occasional typographical errors not identified and edited despite initial review prior to signing.   [RC]      ED Course User Index  [RC] Aly Prieto MD       I decided to obtain the patient's medical records. I reviewed patient's prior external notes / results: specialist documentation   .     Additional Medical Decision Making: Prescription drug management    Medications   naproxen tablet 500 mg (500 mg Oral Given 8/8/24 1836)   methocarbamoL tablet 1,500 mg (1,500 mg Oral Given 8/8/24 1837)   diazePAM tablet 5 mg (5 mg Oral Given 8/8/24 1942)   acetaminophen tablet 650 mg (650 mg Oral Given 8/8/24 1942)              Diagnostic Impression:    1. Chest wall pain    2. Chest pain    3. Paresthesias         ED Disposition Condition    Discharge  Good          No future appointments.   ED Prescriptions       Medication Sig Dispense Start Date End Date Auth. Provider    naproxen (NAPROSYN) 500 MG tablet Take 1 tablet (500 mg total) by mouth 2 (two) times daily as needed (pain). 28 tablet 8/8/2024 8/22/2024 Aly Prieto MD    LIDOcaine (LIDODERM) 5 % Apply to affected area. Use as directed. May use 4% lidocaine patch as alternative. 15 patch 8/8/2024 -- Aly Prieto MD    diclofenac sodium (VOLTAREN) 1 % Gel Apply 2 g topically 3 (three) times daily as needed (muscle spasm pain). Apply 2 grams to affected area 3 times daily as needed 100 g 8/8/2024 -- Aly Preito MD    methocarbamoL (ROBAXIN) 750 MG Tab Take 2 tablets (1,500 mg total) by mouth 3 (three) times daily as needed (Muscle spasm pain). 30 tablet 8/8/2024 8/13/2024 Aly Prieto MD          Follow-up Information    None         ---  IYessica, scribed for, and in the presence of, Dr. Prieto. I performed the scribed service and the documentation accurately describes the services I performed. I attest to the accuracy of the note.     Physician Attestation for Scribe:   I, Aly Prieto MD, reviewed documentation as scribed in my presence, which is both accurate and complete.       Aly Prieto MD  08/08/24 2025

## 2024-08-09 LAB
OHS QRS DURATION: 78 MS
OHS QTC CALCULATION: 424 MS

## 2024-08-09 NOTE — DISCHARGE INSTRUCTIONS

## 2024-08-09 NOTE — ED NOTES
Pt axo x4, resting in bed, pt currently on cell phone, NAD noted. Pt endorses chest tightness with deep breaths. Pt's brother at bedside.

## 2024-09-04 NOTE — Clinical Note
[facility] Evaluation       Name: Serjio Nunes (Serjio Nunes)  /Age: 1954/70 y.o.     In-Person       Chief Complaint: Lung nodules / History of breast cancer     HPI    Date of Consult: 24    Referring Provider: Dr. Bansal     Surgery, Date, and Length: Thoracoscopy; Right Lung Wedge Resection; 24; 120 minutes     Serjio Nunes  is a 70 year-old female who presents to the [facility] for perioperative risk assessment prior to surgery. Patient had lung nodule seen on CT MRI cardiac dated 2024. CT scan obtained  showed a 1cm right upper lobe nodule slightly increased in size compared to MRI. Patient had a recent pneumonia causing SOB and cough but these sxs have overall improved since she finished course of abx. Has had stomach upset and indigestion. Along with this has had some issues with weight loss (approx 25+ lbs/the last few months).     CT chest 24 showed 1. There is a 1.0 x 0.8 cm subpleural anterior right upper lobe  pulmonary nodule, consistent with the nodule seen on MRI cardiac scoring dated 2024. Comparison to the MRI examination is limited due to the difference in imaging modality, however it appears  to have increased in size. Further evaluation with a PET scan and/or tissue biopsy is recommended. No thoracic lymphadenopathy.  2. Scattered ground-glass opacities throughout bilateral lungs  suspicious for multifocal infectious/inflammation.  3. Heterogeneous enlarged appearance of the thyroid gland with  multiple hypodense nodule, recommend correlation with thyroid  ultrasound for multinodular goiter.  4. Postsurgical changes of left lumpectomy. There is a 1.3 cm nodular  soft tissue density within the left breast, recommend correlation  with breast mammogram.  5. Multiple partially visualized simple left renal cysts and a new  incompletely characterized hypodense lesion measuring 1.2 cm. Further  evaluation with an MRI renal protocol is recommended.  NM  Ofelia Ogden was seen and treated in our emergency department on 6/19/2020.  She may return to work on 06/20/2020.       If you have any questions or concerns, please don't hesitate to call.      Carlos Campo MD PET CT lung 8/20/24 showed 1. Anterior right upper lobe nodule is seen, with mild FDG avidity,for which a slow growing neoplasm is not excluded. Follow-up CT chestis recommended.  2. No hypermetabolic rupesh or distant malignancy.        This note was created in part upon personal review of patient's medical records.      Patient is scheduled to have Thoracoscopy; Right Lung Wedge Resection     Medical History  Past Medical History:   Diagnosis Date    Anemia     Anxiety     Asthma (HHS-HCC)     Bipolar disorder (Multi)     stable    Breast cancer (Multi) 2013    left breast cancer s/p lumpectomy; chemotherapy and radiation    Cardiology follow-up encounter 03/19/2024    Brody MOON MD    CHF (congestive heart failure) (Multi)     stable    Chronic venous insufficiency     status post vein ablation    COPD (chronic obstructive pulmonary disease) (Multi)     no O2    Coronary artery disease     , [facility] with non-obstructive CAD on 4/8/16    Diverticulosis     DVT (deep venous thrombosis) (Multi) 1972    right leg, provoked w/ pregnancy    Fibromyalgia     GERD (gastroesophageal reflux disease)     under control    Gout     no recent acute    Hyperlipidemia     Hypertension     Insomnia     Lumbar radiculopathy     Lung nodule     Plan: Thoracoscopy; Right Lung Wedge Resection 9/12/24    Nonrheumatic aortic (valve) insufficiency     Osteoarthritis     RLS (restless legs syndrome)     Sleep apnea     unable tolerate CPAP - sleep with multiple pillows    TIA (transient ischemic attack) 2015    no residual - no recurrence    Type 2 diabetes mellitus (Multi)     Vitamin D deficiency         STOP BANG = +BILL     Caprini =  10   (age,surgery, BMI>25, hx malignancy, hx DVT)    Surgical History  Past Surgical History:   Procedure Laterality Date    BREAST BIOPSY      BREAST LUMPECTOMY Left 2013    BUNIONECTOMY Bilateral     CARDIAC CATHETERIZATION  04/08/2016    CERVICAL FUSION      FOOT SURGERY Right     screw great toe     HERNIA REPAIR      ventral    HYSTERECTOMY      ROTATOR CUFF REPAIR Left     SOFT TISSUE BIOPSY  2024    Thyroid    VENOUS ABLATION               Family history:  Family History   Problem Relation Name Age of Onset    Cancer Mother          COLORECTAL    Aneurysm Mother      Stroke Mother      Arthritis Father      Hypertension Father      Aneurysm Father          Social history:  Social History     Socioeconomic History    Marital status:      Spouse name: Not on file    Number of children: Not on file    Years of education: Not on file    Highest education level: Not on file   Occupational History    Not on file   Tobacco Use    Smoking status: Former     Current packs/day: 0.00     Average packs/day: 0.5 packs/day for 48.0 years (24.0 ttl pk-yrs)     Types: Cigarettes     Start date:      Quit date:      Years since quittin.6    Smokeless tobacco: Never   Vaping Use    Vaping status: Never Used   Substance and Sexual Activity    Alcohol use: Not Currently     Comment: rare    Drug use: Yes     Types: Marijuana     Comment: Smokes daily no other drug hx    Sexual activity: Defer   Other Topics Concern    Not on file   Social History Narrative    Not on file     Social Determinants of Health     Financial Resource Strain: Not on File (2021)    [facility] from [facility]    Financial Resource Strain     Financial Resource Strain: 0   Food Insecurity: Food Insecurity Present (2024)    Hunger Vital Sign     Worried About Running Out of Food in the Last Year: Sometimes true     Ran Out of Food in the Last Year: Sometimes true   Transportation Needs: Not on File (2021)    Received from [facility]    Transportation Needs     Transportation: 0   Physical Activity: Not on File (2021)    Received from [facility]    Physical Activity     Physical Activity: 0   Stress: Not on File (2021)    Received from [facility]    Stress     Stress: 0   Social Connections: Not on  File (2021)    Received from [facility]    Social Connections     Social Connections and Isolation: 0   Intimate Partner Violence: Not on file   Housing Stability: Not on File (2021)    Received from [facility]    Housing Stability     Housin          Current Outpatient Medications:     amLODIPine (Norvasc) 10 mg tablet, TAKE 1 TABLET BY MOUTH EVERY DAY, Disp: 90 tablet, Rfl: 3    aspirin 81 mg EC tablet, Take 1 tablet (81 mg) by mouth once daily., Disp: , Rfl:     busPIRone (Buspar) 5 mg tablet, TAKE 1 TABLET (5 MG) BY MOUTH 2 TIMES A DAY AS NEEDED (WHEN FEELING ANXIOUS)., Disp: 180 tablet, Rfl: 3    carvedilol (Coreg) 25 mg tablet, TAKE 1 TABLET BY MOUTH TWICE A DAY, Disp: 180 tablet, Rfl: 3    cholecalciferol (Vitamin D-3) 50 MCG (2000 UT) tablet, Take 1 tablet (2,000 Units) by mouth once daily., Disp: , Rfl:     colchicine, gout, 0.6 mg tablet, TAKE 1 TABLET BY MOUTH EVERY DAY (Patient taking differently: Take by mouth once daily.), Disp: 90 tablet, Rfl: 1    fluticasone (Flonase) 50 mcg/actuation nasal spray, Administer 1 spray into each nostril once daily., Disp: , Rfl:     furosemide (Lasix) 40 mg tablet, Take 0.5 tablets (20 mg) by mouth once daily., Disp: 30 tablet, Rfl: 3    Klor-Con M10 10 mEq ER tablet, TAKE 1 TABLET BY MOUTH EVERY DAY, Disp: 90 tablet, Rfl: 3    losartan (Cozaar) 100 mg tablet, Take 0.5 tablets (50 mg) by mouth once daily., Disp: 90 tablet, Rfl: 0    omeprazole OTC (PriLOSEC OTC) 20 mg EC tablet, Take 1 tablet (20 mg) by mouth once daily. Do not crush, chew, or split., Disp: 30 tablet, Rfl: 11    tiZANidine (Zanaflex) 4 mg tablet, TAKE 1 TABLET (4 MG) BY MOUTH EVERY 6 HOURS IF NEEDED FOR MUSCLE SPASMS, Disp: 90 tablet, Rfl: 1    Tradjenta 5 mg tablet, TAKE 1 TABLET BY MOUTH EVERY DAY AS DIRECTED, Disp: 90 tablet, Rfl: 1    albuterol 90 mcg/actuation inhaler, Inhale 1 puff 3 times a day. (Patient not taking: Reported on 2024), Disp: 18 g, Rfl: 2    chlorhexidine  (Peridex) 0.12 % solution, 15 ml swish and spit for 30 seconds night prior to surgery and morning of surgery, Disp: 473 mL, Rfl: 0    clotrimazole-betamethasone (Lotrisone) cream, APPLY THIN COAT TO AFFECTED AREA TWICE A DAY IN THE MORNING AND IN THE EVENING (Patient not taking: Reported on 9/4/2024), Disp: 45 g, Rfl: 3    fluocinonide 0.05 % cream, Apply topically 2 times a day. APPLY SPARINGLY TO AFFECTED AREA, Disp: , Rfl:     fluticasone propion-salmeteroL (Advair Diskus) 250-50 mcg/dose diskus inhaler, Inhale 1 puff 2 times a day., Disp: , Rfl:     FreeStyle Jaycob 14 Day Sensor kit, APPLY SENSOR TO BACK UPPER ARM REMOVE AND REPLACE EVERY 14 DAYS USE WITH DEVICE, Disp: 10 each, Rfl: 3    guaiFENesin (Mucinex) 600 mg 12 hr tablet, Take 2 tablets (1,200 mg) by mouth 2 times a day. Do not crush, chew, or split. (Patient not taking: Reported on 9/4/2024), Disp: 120 tablet, Rfl: 11    ipratropium-albuteroL (Duo-Neb) 0.5-2.5 mg/3 mL nebulizer solution, TAKE 3 ML BY NEBULIZATION 3 TIMES A DAY (Patient not taking: Reported on 9/4/2024), Disp: 180 mL, Rfl: 2    miconazole (Micotin) 2 % cream, miconazole nitrate 2 % topical cream  APPLY SPARINGLY TO AFFECTED AREA TWICE A DAY, Disp: , Rfl:     nitroglycerin (Nitrostat) 0.4 mg SL tablet, Place 1 tablet (0.4 mg) under the tongue every 5 minutes if needed for chest pain. (Patient not taking: Reported on 9/4/2024), Disp: 90 tablet, Rfl: 11     LABS:  Lab Results   Component Value Date    WBC 13.3 (H) 09/04/2024    HGB 14.0 09/04/2024    HCT 43.1 09/04/2024    MCV 90 09/04/2024     09/04/2024      Lab Results   Component Value Date    GLUCOSE 110 (H) 09/04/2024    CALCIUM 8.5 (L) 09/04/2024     09/04/2024    K 3.5 09/04/2024    CO2 24 09/04/2024     09/04/2024    BUN 14 09/04/2024    CREATININE 1.34 (H) 09/04/2024     Lab Results   Component Value Date    ALT 16 09/04/2024    AST 16 09/04/2024    ALKPHOS 94 09/04/2024    BILITOT 0.4 09/04/2024     "  Coagulation Screen  Order: [id number]   Status: Final result       Visible to patient: Yes (not seen)       Dx: Abnormal coagulation profile; Lung no...    0 Result Notes            Component  Ref Range & Units 1 d ago  (9/4/24) 2 yr ago  (7/19/22) 3 yr ago  (6/24/21) 3 yr ago  (6/23/21) 5 yr ago  (8/10/19) 5 yr ago  (8/9/19) 5 yr ago  (7/25/19)   Protime  9.8 - 12.8 seconds 11.7 10.0 R 12.1 R 13.0 R 14.4 High  R 13.5 High  R 11.6 R   INR  0.9 - 1.1 1.0 1.0 R, CM 1.0 1.1 1.3 High  1.2 High  1.0   aPTT  27 - 38 seconds 32 23.5 R, CM 17 Low  R, CM  29 R, CM 25 Low  R, CM 32 R, CM   Resulting Agency [facility]              Narrative  Performed by: [facility]  The APTT is no longer used for monitoring Unfractionated Heparin Therapy. For monitoring Heparin Therapy, use the Heparin Assay.      Specimen Collected: 09/04/24 15:35          Visit Vitals  /57   Pulse 67   Temp 36.2 °C (97.2 °F)   Resp 16   Ht 1.626 m (5' 4\")   Wt 77.2 kg (170 lb 3.1 oz)   SpO2 97%   BMI 29.21 kg/m²   OB Status Hysterectomy   Smoking Status Former   BSA 1.87 m²        Review of Systems   Constitutional: Negative.    HENT: Negative.     Eyes: Negative.    Respiratory: Negative.     Cardiovascular: Negative.         METS 4   +stairs / walking / shopping / house work Without chest pain / SOB    Gastrointestinal: Negative.    Endocrine: Negative.    Genitourinary: Negative.    Musculoskeletal: Negative.    Skin: Negative.    Allergic/Immunologic: Negative.    Neurological: Negative.    Hematological: Negative.    Psychiatric/Behavioral: Negative.          Physical Exam  Vitals reviewed.   Constitutional:       Appearance: Normal appearance.   HENT:      Head: Normocephalic and atraumatic.      Right Ear: External ear normal.      Left Ear: External ear normal.      Nose: Nose normal.      Mouth/Throat:      Pharynx: Oropharynx is clear.   Eyes:      Extraocular Movements: Extraocular " movements intact.      Conjunctiva/sclera: Conjunctivae normal.      Pupils: Pupils are equal, round, and reactive to light.   Neck:      Comments: Cervical decreased ROM   Cardiovascular:      Rate and Rhythm: Normal rate and regular rhythm.      Heart sounds: Normal heart sounds.   Pulmonary:      Effort: Pulmonary effort is normal.      Breath sounds: Normal breath sounds.   Abdominal:      Palpations: Abdomen is soft.   Musculoskeletal:         General: Normal range of motion.   Skin:     General: Skin is warm and dry.   Neurological:      General: No focal deficit present.      Mental Status: She is alert and oriented to person, place, and time.   Psychiatric:         Mood and Affect: Mood normal.         Behavior: Behavior normal.          PAT AIRWAY:   Airway:     Mallampati::  II    Neck ROM::  Limited   upper dentures and lower dentures        Lab Results   Component Value Date    HGBA1C 6.2 (H) 08/05/2024        Encounter Date: 08/13/24   ECG 12 lead (Clinic Performed)    Narrative    Sinus rhythm nonspecific ST and T changes      NST 1/12/24  Impression   1. Normal stress myocardial perfusion imaging in response to  pharmacologic stress.  2. Well-maintained left ventricular function.       TTE 4/14/23  CONCLUSIONS:  1. Left ventricular systolic function is normal with a 55-60% estimated ejection fraction.  2. Spectral Doppler shows an impaired relaxation pattern of left ventricular diastolic filling.  3. Moderate aortic valve regurgitation. Consider Cardiac MRI for further evaluation.    Cardiac MRI 3/7/24  IMPRESSION:  1. Moderate aortic valve regurgitation. Regurgitant fraction 38%.  Regurgitant volume 20%.  2. Normal biventricular size and function. LVEF 68%. RVEF 60%.  3. There is no evidence of LGE/scar to suggest prior infarct or  infiltrative process.  4. There is a density appreciated in the right upper lung field  measuring 7 mm x 5 mm in diameter poorly characterized with current  study.  Consider dedicated chest CT imaging for further  characterization.          RCRI  0  , 3.9 % Risk of MACE    Cardiac  CAD - ASA Continue DOS   CHF - furosemide Continue DOS   HTN - losartan HOLD 24 hours prior to surgery   amlodipine, carvedilol Continue DOS      Pulmonary  COPD /asthma - continue inhalers DOS     Endocrinology  DM - tradjenta - HOLD evening prior and morning of surgery    Renal:  CKDIIIB  Recommendations to avoid nephrotoxic drugs and carefully monitor fluid status to maintain euvolemia. Use dose adjusted medications as needed for the underlying level of renal function.   3/18/24 Crt 1.11; GFR 53  8/5/24 Crt 1.33; GFR 43  9/4/24 Crt 1.34; GFR 42    Hematology   Leukocytosis  8/5/24 WBC 15.9  9/4/24 WBC 13.3 (trending down appropriately)        Patient instructed to ambulate as soon as possible postoperatively to decrease thromboembolic risk.      Initiate mechanical DVT prophylaxis as soon as possible and initiate chemical prophylaxis when deemed safe from a bleeding standpoint post surgery.       VTE prophylaxis per surgical team     GI  GERD - omeprazole  Continue DOS      Tests ordered in PAT: cbc, bmp, coag, t&s, mrsa    Results show slight elevated WBC which is trending down (15.9 --> 13.3) and stable CKD.  No additional testing required.     Risk assessment complete.  Patient is scheduled for a intermediate/high surgical risk procedure.        Preoperative medication instructions were provided and reviewed with the patient.  Any additional testing or evaluation was explained to the patient.  Nothing by mouth instructions were discussed and patient's questions were answered prior to conclusion to this encounter.  Patient verbalized understanding of preoperative instructions given in preadmission testing; discharge instructions available in EMR.    This note was dictated by a speech recognition.  Minor errors may have been detected in a speech recognition.

## 2024-12-10 ENCOUNTER — HOSPITAL ENCOUNTER (EMERGENCY)
Facility: OTHER | Age: 39
Discharge: HOME | End: 2024-12-10
Attending: EMERGENCY MEDICINE
Payer: MEDICAID

## 2024-12-10 VITALS
TEMPERATURE: 99 F | OXYGEN SATURATION: 100 % | WEIGHT: 206 LBS | BODY MASS INDEX: 32.33 KG/M2 | RESPIRATION RATE: 16 BRPM | HEART RATE: 57 BPM | DIASTOLIC BLOOD PRESSURE: 59 MMHG | HEIGHT: 67 IN | SYSTOLIC BLOOD PRESSURE: 100 MMHG

## 2024-12-10 DIAGNOSIS — N92.0 MENORRHAGIA WITH REGULAR CYCLE: Primary | ICD-10-CM

## 2024-12-10 LAB
ALBUMIN SERPL BCP-MCNC: 3.7 G/DL (ref 3.5–5.2)
ALP SERPL-CCNC: 52 U/L (ref 40–150)
ALT SERPL W/O P-5'-P-CCNC: 14 U/L (ref 10–44)
ANION GAP SERPL CALC-SCNC: 7 MMOL/L (ref 8–16)
AST SERPL-CCNC: 14 U/L (ref 10–40)
BASOPHILS # BLD AUTO: 0.03 K/UL (ref 0–0.2)
BASOPHILS NFR BLD: 0.7 % (ref 0–1.9)
BILIRUB SERPL-MCNC: 0.4 MG/DL (ref 0.1–1)
BUN SERPL-MCNC: 9 MG/DL (ref 6–20)
CALCIUM SERPL-MCNC: 8.4 MG/DL (ref 8.7–10.5)
CHLORIDE SERPL-SCNC: 109 MMOL/L (ref 95–110)
CO2 SERPL-SCNC: 24 MMOL/L (ref 23–29)
CREAT SERPL-MCNC: 0.8 MG/DL (ref 0.5–1.4)
CTP QC/QA: YES
DIFFERENTIAL METHOD BLD: ABNORMAL
EOSINOPHIL # BLD AUTO: 0.1 K/UL (ref 0–0.5)
EOSINOPHIL NFR BLD: 2.3 % (ref 0–8)
ERYTHROCYTE [DISTWIDTH] IN BLOOD BY AUTOMATED COUNT: 15.4 % (ref 11.5–14.5)
EST. GFR  (NO RACE VARIABLE): >60 ML/MIN/1.73 M^2
GLUCOSE SERPL-MCNC: 92 MG/DL (ref 70–110)
GROUP A STREP, MOLECULAR: NEGATIVE
HCT VFR BLD AUTO: 31.9 % (ref 37–48.5)
HGB BLD-MCNC: 10.6 G/DL (ref 12–16)
IMM GRANULOCYTES # BLD AUTO: 0.02 K/UL (ref 0–0.04)
IMM GRANULOCYTES NFR BLD AUTO: 0.5 % (ref 0–0.5)
LYMPHOCYTES # BLD AUTO: 1.5 K/UL (ref 1–4.8)
LYMPHOCYTES NFR BLD: 35.3 % (ref 18–48)
MCH RBC QN AUTO: 29.8 PG (ref 27–31)
MCHC RBC AUTO-ENTMCNC: 33.2 G/DL (ref 32–36)
MCV RBC AUTO: 90 FL (ref 82–98)
MONOCYTES # BLD AUTO: 0.4 K/UL (ref 0.3–1)
MONOCYTES NFR BLD: 9.9 % (ref 4–15)
NEUTROPHILS # BLD AUTO: 2.2 K/UL (ref 1.8–7.7)
NEUTROPHILS NFR BLD: 51.3 % (ref 38–73)
NRBC BLD-RTO: 0 /100 WBC
PLATELET # BLD AUTO: 341 K/UL (ref 150–450)
PMV BLD AUTO: 9.5 FL (ref 9.2–12.9)
POC MOLECULAR INFLUENZA A AGN: NEGATIVE
POC MOLECULAR INFLUENZA B AGN: NEGATIVE
POTASSIUM SERPL-SCNC: 3 MMOL/L (ref 3.5–5.1)
PROT SERPL-MCNC: 6.9 G/DL (ref 6–8.4)
RBC # BLD AUTO: 3.56 M/UL (ref 4–5.4)
SODIUM SERPL-SCNC: 140 MMOL/L (ref 136–145)
WBC # BLD AUTO: 4.36 K/UL (ref 3.9–12.7)

## 2024-12-10 PROCEDURE — 87651 STREP A DNA AMP PROBE: CPT

## 2024-12-10 PROCEDURE — 85025 COMPLETE CBC W/AUTO DIFF WBC: CPT

## 2024-12-10 PROCEDURE — 25000003 PHARM REV CODE 250

## 2024-12-10 PROCEDURE — 99284 EMERGENCY DEPT VISIT MOD MDM: CPT | Mod: 25

## 2024-12-10 PROCEDURE — 80053 COMPREHEN METABOLIC PANEL: CPT

## 2024-12-10 RX ORDER — ACETAMINOPHEN 500 MG
500 TABLET ORAL EVERY 6 HOURS PRN
Qty: 30 TABLET | Refills: 0 | Status: SHIPPED | OUTPATIENT
Start: 2024-12-10

## 2024-12-10 RX ORDER — IBUPROFEN 600 MG/1
600 TABLET ORAL EVERY 6 HOURS PRN
Qty: 20 TABLET | Refills: 0 | Status: SHIPPED | OUTPATIENT
Start: 2024-12-10

## 2024-12-10 RX ORDER — ONDANSETRON 4 MG/1
4 TABLET, ORALLY DISINTEGRATING ORAL
Status: COMPLETED | OUTPATIENT
Start: 2024-12-10 | End: 2024-12-10

## 2024-12-10 RX ORDER — ACETAMINOPHEN 325 MG/1
650 TABLET ORAL
Status: COMPLETED | OUTPATIENT
Start: 2024-12-10 | End: 2024-12-10

## 2024-12-10 RX ADMIN — ACETAMINOPHEN 650 MG: 325 TABLET, FILM COATED ORAL at 07:12

## 2024-12-10 RX ADMIN — POTASSIUM BICARBONATE 40 MEQ: 391 TABLET, EFFERVESCENT ORAL at 07:12

## 2024-12-10 RX ADMIN — ONDANSETRON 4 MG: 4 TABLET, ORALLY DISINTEGRATING ORAL at 07:12

## 2024-12-10 NOTE — Clinical Note
"Ofelia"Audelia Ogden was seen and treated in our emergency department on 12/10/2024.  She may return to work on 12/11/2024.       If you have any questions or concerns, please don't hesitate to call.      Chucho Pereira PA-C"

## 2024-12-11 NOTE — ED PROVIDER NOTES
"Encounter Date: 12/10/2024       History     Chief Complaint   Patient presents with    Vaginal Bleeding     Pt c/o heavy vaginal bleeding since Saturday which is on the normal schedule of her cycle. States that she is bleeding through pads or tampons "like every 10 minutes." Also endorsing bodywide aches and pains, emesis. States she vomited 2 times today. In no distress.     The history is provided by the patient and medical records.   39 year old female with past medical history of depression presenting to the emergency department today for evaluation of heavy menstrual bleeding, general body aches, N/V since Saturday. States she started her regular menstrual cycle Saturday and endorses her symptoms as her normal menstrual cycle symptoms but worse. States she has been using a pad every 10 minutes since Saturday. Has not taken any medications for her symptoms. No other complaints at this time. Denies any fever, CP, SOB, abdominal pain, urinary complaints, vaginal discharge, or other associated symptoms.   Review of patient's allergies indicates:  No Known Allergies  Past Medical History:   Diagnosis Date    Depression      Past Surgical History:   Procedure Laterality Date    DIAGNOSTIC LAPAROSCOPY N/A 6/18/2022    Procedure: LAPAROSCOPY, DIAGNOSTIC;  Surgeon: Mariposa Kruger MD;  Location: [facility];  Service: OB/GYN;  Laterality: N/A;    LAPAROSCOPIC LYSIS OF ADHESIONS  6/18/2022    Procedure: LYSIS, ADHESIONS, LAPAROSCOPIC;  Surgeon: Mariposa Kruger MD;  Location: [facility];  Service: OB/GYN;;    SALPINGOOPHORECTOMY Right 6/18/2022    Procedure: SALPINGO-OOPHORECTOMY;  Surgeon: Mariposa Kruger MD;  Location: [facility];  Service: OB/GYN;  Laterality: Right;     No family history on file.  Social History     Tobacco Use    Smoking status: Never   Substance Use Topics    Alcohol use: Yes     Comment: occassional    Drug use: No     Review of Systems   Constitutional:  Negative for chills, diaphoresis, " fatigue and fever.   HENT:  Negative for congestion, rhinorrhea and sore throat.    Eyes:  Negative for redness and visual disturbance.   Respiratory:  Negative for cough and shortness of breath.    Cardiovascular:  Negative for chest pain, palpitations and leg swelling.   Gastrointestinal:  Positive for nausea and vomiting. Negative for abdominal pain and diarrhea.   Genitourinary:  Positive for vaginal bleeding. Negative for decreased urine volume, difficulty urinating, dysuria, flank pain, frequency, genital sores, hematuria, menstrual problem, pelvic pain, urgency, vaginal discharge and vaginal pain.   Musculoskeletal:  Positive for myalgias (generalized). Negative for arthralgias, back pain, neck pain and neck stiffness.   Skin:  Negative for rash.   Neurological:  Negative for dizziness, weakness, light-headedness and headaches.   Hematological:  Does not bruise/bleed easily.       Physical Exam     Initial Vitals [12/10/24 1759]   BP Pulse Resp Temp SpO2   (!) 114/58 66 18 98.1 °F (36.7 °C) 99 %      MAP       --         Physical Exam    Nursing note and vitals reviewed.  Constitutional: She appears well-developed and well-nourished. She is not diaphoretic. No distress.   HENT:   Head: Normocephalic and atraumatic.   Right Ear: External ear normal.   Left Ear: External ear normal.   Nose: Nose normal. Mouth/Throat: Oropharynx is clear and moist.   Eyes: Conjunctivae and EOM are normal. Pupils are equal, round, and reactive to light. Right eye exhibits no discharge. Left eye exhibits no discharge.   Neck: Neck supple.   Normal range of motion.   Full passive range of motion without pain.     Cardiovascular:  Normal rate, regular rhythm, normal heart sounds and normal pulses.     Exam reveals no distant heart sounds and no friction rub.       Pulmonary/Chest: Effort normal and breath sounds normal. No respiratory distress.   Abdominal: Abdomen is soft. Bowel sounds are normal. She exhibits no distension, no  pulsatile midline mass and no mass. There is no splenomegaly or hepatomegaly. There is abdominal tenderness in the suprapubic area.   No right CVA tenderness.  No left CVA tenderness. There is no rebound and no guarding.   Genitourinary:    Uterus normal.      Pelvic exam was performed with patient supine.   No labial fusion. There is no rash, tenderness, lesion or injury on the right labia. There is no rash, tenderness, lesion or injury on the left labia. Cervix exhibits no motion tenderness, no discharge and no friability. Right adnexum displays no mass, no tenderness and no fullness. Left adnexum displays no mass, no tenderness and no fullness.    Vaginal bleeding (small amount in vault. no active bleeding.) present.      No vaginal discharge, erythema or tenderness.   There is bleeding (small amount in vault. no active bleeding.) in the vagina. No erythema or tenderness in the vagina.    No foreign body in the vagina.      No signs of injury in the vagina.      Genitourinary Comments: Chaperoned by Shy Joy RN    Light amount of blood in vaginal vault. No active bleeding. No acute abnormalities.      Musculoskeletal:         General: Normal range of motion.      Right shoulder: Normal.      Left shoulder: Normal.      Right elbow: Normal.      Left elbow: Normal.      Right wrist: Normal.      Left wrist: Normal.      Right hand: Normal.      Left hand: Normal.      Cervical back: Normal, full passive range of motion without pain, normal range of motion and neck supple.      Thoracic back: Normal.      Lumbar back: Normal.      Right hip: Normal.      Left hip: Normal.      Right knee: Normal.      Left knee: Normal.      Right lower leg: Normal.      Left lower leg: Normal.      Right ankle: Normal.      Left ankle: Normal.      Right foot: Normal.      Left foot: Normal.     Neurological: She is alert and oriented to person, place, and time. She has normal strength. No cranial nerve deficit or sensory  deficit. Gait normal.   Skin: Skin is warm and dry. Capillary refill takes less than 2 seconds. No bruising, no ecchymosis and no rash noted. No pallor.   Psychiatric: She has a normal mood and affect. Her speech is normal and behavior is normal. Thought content normal.         ED Course   Procedures  Labs Reviewed   CBC W/ AUTO DIFFERENTIAL - Abnormal       Result Value    WBC 4.36      RBC 3.56 (*)     Hemoglobin 10.6 (*)     Hematocrit 31.9 (*)     MCV 90      MCH 29.8      MCHC 33.2      RDW 15.4 (*)     Platelets 341      MPV 9.5      Immature Granulocytes 0.5      Gran # (ANC) 2.2      Immature Grans (Abs) 0.02      Lymph # 1.5      Mono # 0.4      Eos # 0.1      Baso # 0.03      nRBC 0      Gran % 51.3      Lymph % 35.3      Mono % 9.9      Eosinophil % 2.3      Basophil % 0.7      Differential Method Automated     COMPREHENSIVE METABOLIC PANEL - Abnormal    Sodium 140      Potassium 3.0 (*)     Chloride 109      CO2 24      Glucose 92      BUN 9      Creatinine 0.8      Calcium 8.4 (*)     Total Protein 6.9      Albumin 3.7      Total Bilirubin 0.4      Alkaline Phosphatase 52      AST 14      ALT 14      eGFR >60      Anion Gap 7 (*)    GROUP A STREP, MOLECULAR    Group A Strep, Molecular Negative     URINALYSIS, REFLEX TO URINE CULTURE   POCT URINE PREGNANCY   POCT INFLUENZA A/B MOLECULAR          Imaging Results              US Pelvis Comp with Transvag NON-OB (xpd) (Final result)  Result time 12/10/24 19:38:32   Procedure changed from US Pelvis Complete Non OB     Final result by Handy Macedo MD (12/10/24 19:38:32)                   Impression:      No significant abnormalities identified.    Right oophorectomy.      Electronically signed by: Handy Macedo MD  Date:    12/10/2024  Time:    19:38               Narrative:    EXAMINATION:  US PELVIS COMP WITH TRANSVAG NON-OB (XPD)    CLINICAL HISTORY:  vaginal bleeding;    TECHNIQUE:  Transabdominal sonography of the pelvis was performed, followed by  transvaginal sonography to better evaluate the uterus and ovaries.    COMPARISON:  June 2022.    FINDINGS:  The uterus measures 11 x 4 x 5 cm. Uterine parenchyma is heterogenous in echotexture without evidence for masses. The endometrial echo complex is normal in thickness and measures 9 mm.    Right ovary has reportedly been removed.  Left ovary measures 3 x 3 x 3 cm with preserved flow and multiple follicle seen.  No significant adnexal abnormalities are visualized.  No significant free fluid is seen.                                       Medications   acetaminophen tablet 650 mg (650 mg Oral Given 12/10/24 1912)   ondansetron disintegrating tablet 4 mg (4 mg Oral Given 12/10/24 1912)   potassium bicarbonate disintegrating tablet 40 mEq (40 mEq Oral Given 12/10/24 1957)     Medical Decision Making  39 year old female with past medical history of depression presenting to the emergency department today for evaluation of heavy menstrual bleeding, general body aches, N/V since Saturday. States she started her regular menstrual cycle Saturday and endorses her symptoms as her normal menstrual cycle symptoms but worse. States she has been using a pad every 10 minutes since Saturday. Has not taken any medications for her symptoms. No other complaints at this time. Denies any fever, CP, SOB, abdominal pain, urinary complaints, vaginal discharge, or other associated symptoms.   Patient's chart and medical history reviewed.  Patient's vitals reviewed.  They are afebrile, no respiratory distress, nontoxic-appearing in the ED.  Differential diagnosis is considered dysmenorrhea, ovarian cyst/ruptured, ectopic pregnancy, miscarriage, UTI, fibroids, other  etiology.  Patient with her typical menstrual cycle symptoms but worse. Exam as above. More per ED course. Patient H/H stable. Patient notes later in the visit that she used to be on depo due to her heavy menstrual cycles and has been off the last several months. No emergent  findings on patient workup today including exam, labs, and imaging. Advised patient to f/u with her OBGYN for re-evaluation and management. Patient agrees and does not have any questions at this time.  At this time I'll discharge home to follow up with primary care physician in the next 1-2 days for further evaluation.  If the symptom/symptoms continue the pt will need to see OBGYN for further evaluation.  The patient/family is comfortable with this plan and comfortable going home at this time. After taking into careful account the historical factors and physical exam findings of the patient's presentation today, in conjunction with the empirical and objective data obtained on ED workup, no acute emergent medical condition has been identified. The patient appears to be low risk for an emergent medical condition and I feel it is safe and appropriate at this time for the patient to be discharged to follow-up as detailed in their discharge instructions for reevaluation and possible continued outpatient workup and management. I have discussed the specifics of the workup with the patient/family and they have verbalized understanding of the details of the workup, the diagnosis, the treatment plan, and the need for outpatient follow-up.  Although the patient has no emergent etiology today this does not preclude the development of an emergent condition so in addition, I have advised the patient/family that they can return to the ED and/or activate EMS at any time with worsening of their symptoms, change of their symptoms, or with any other medical complaint.  The patient remained comfortable and stable during their visit in the ED.  Discharge and follow-up instructions discussed with the patient/family who expressed understanding and willingness to comply with my recommendations. I discussed with the patient/family the diagnosis, treatment plan, indications for return to the emergency department, and for expected follow-up.  I again reiterated to please follow up with their primary doctor in 1-2 days and return to the ED in any new, worsening, or continued symptoms. The patient/family verbalized an understanding. The patient/family is asked if there are any questions or concerns. We discuss the case, until all issues are addressed to the patient/family's satisfaction. Patient/family understands and is agreeable to the plan.   CHUCHO KO PA-C    DISCLAIMER: This note was prepared with [employer] voice recognition transcription software. Garbled syntax, mangled pronouns, and other bizarre constructions may be attributed to that software system.          Amount and/or Complexity of Data Reviewed  Labs: ordered. Decision-making details documented in ED Course.  Radiology: ordered. Decision-making details documented in ED Course.    Risk  OTC drugs.  Prescription drug management.               ED Course as of 12/10/24 2030   Tue Dec 10, 2024   1911 SpO2: 99 % [AF]   1911 Resp: 18 [AF]   1911 Pulse: 66 [AF]   1911 Temp: 98.1 °F (36.7 °C) [AF]   1911 BP(!): 114/58 [AF]   1916 Hemoglobin(!): 10.6 [AF]   1916 Hematocrit(!): 31.9 [AF]   1916 WBC: 4.36 [AF]   1950 US Pelvis Comp with Transvag NON-OB (xpd)  No significant abnormalities identified.     Right oophorectomy.   [AF]      ED Course User Index  [AF] Chucho Ko PA-C                           Clinical Impression:  Final diagnoses:  [N92.0] Menorrhagia with regular cycle (Primary)          ED Disposition Condition    Discharge Stable          ED Prescriptions       Medication Sig Dispense Start Date End Date Auth. Provider    acetaminophen (TYLENOL) 500 MG tablet Take 1 tablet (500 mg total) by mouth every 6 (six) hours as needed for Pain. 30 tablet 12/10/2024 -- Chucho Ko PA-C    ibuprofen (ADVIL,MOTRIN) 600 MG tablet Take 1 tablet (600 mg total) by mouth every 6 (six) hours as needed for Pain. 20 tablet 12/10/2024 -- Chucho Ko PA-C          Follow-up Information        Follow up With Specialties Details Why Contact Info    Gilda Jackson MD Internal Medicine Schedule an appointment as soon as possible for a visit in 1 day for follow up [address] [facility]  [address]  [facility] [address]  [phone]      Pola Robertson Jr., MD Obstetrics Schedule an appointment as soon as possible for a visit in 1 day for follow up [address] [facility] [address] [phone]      [facility] - Emergency Dept Emergency Medicine Go to  If symptoms worsen [address] [facility] [address]  [phone]             Chucho Pereira, PAIRIS  12/10/24 2030

## 2024-12-11 NOTE — ED TRIAGE NOTES
Pt reports to ED with vaginal bleeding that is much heavier than usual along with generalized body aches. States she had to have one of her ovaries removed in the past and has a hx of ovarian cysts and fibroids. Denies clots. C/o abdominal cramping that is worse than usual. VSS. Aaox4.

## 2024-12-11 NOTE — DISCHARGE INSTRUCTIONS
Thank you for coming to our Emergency Department today. It is important to remember that some problems or medical conditions are difficult to diagnose and may not be found or addressed during your Emergency Department visit.  These conditions often start with non-specific symptoms and can only be diagnosed on follow up visits with your primary care physician or specialist when the symptoms continue or change. Please remember that all medical conditions can change, and we cannot predict how you will be feeling tomorrow or the next day. Return to the ER with any questions/concerns, new/concerning symptoms including fever, chest pain, shortness of breath, loss of consciousness, dizziness, weakness, worsening symptoms, failure to improve, or any other concerns. Also, please follow up with your Primary Care Physician and/or Pediatrician in the next 1-2 days to review your ED visit in entirety and for re-evaluation.   Be sure to follow up with your primary care doctor and review all labs/imaging/tests that were performed during your ER visit with them. It is very common for us to identify non-emergent incidental findings which must be followed up with your primary care physician.  Some labs/imaging/tests may be outside of the normal range, and require non-emergent follow-up and/or further investigation/treatment/procedures/testing to help diagnose/exclude/prevent complications or other potentially serious medical conditions. Some abnormalities may not have been discussed or addressed during your ER visit. Some lab results may not return during your ER visit but can be accessible by downloading the free [facility] gregoria or by visiting [email or web address] . It is important for you to review all labs/imaging/tests which are outside of the normal range with your physician.  An ER visit does not replace a primary care visit, and many screening tests or follow-up tests cannot be ordered by an ER doctor or performed by  the ER. Some tests may even require pre-approval.  If you do not have a primary care doctor, you may contact the one listed on your discharge paperwork or you may also call the [facility] Appointment Desk at [phone] , or [address] [place] [address] at  [phone] to schedule an appointment, or establish care with a primary care doctor or even a specialist and to obtain information about local resources. It is important to your health that you have a primary care doctor.  Please take all medications as directed. We have done our best to select a medication for you that will treat your condition however, all medications may potentially have side-effects and it is impossible to predict which medications may give you side-effects or what those side-effects (if any) those medications may give you.  If you feel that you are having a negative effect or side-effect of any medication you should stop taking those medications immediately and seek medical attention. If you feel that you are having a life-threatening reaction call 911.  Do not drive, swim, climb to height, take a bath, operate heavy machinery, drink alcohol or take potentially sedating medications, sign any legal documents or make any important decisions for 24 hours if you have received any pain medications, sedatives or mood altering drugs during your ER visit or within 24 hours of taking them if they have been prescribed to you.   You can find additional resources for Dentists, hearing aids, durable medical equipment, low cost pharmacies and other resources at [email or web address]  Patient agrees with this plan. Discussed with her strict return precautions, they verbalized understanding. Patient is stable for discharge.   § Please take all medication as prescribed.

## 2025-01-08 ENCOUNTER — HOSPITAL ENCOUNTER (EMERGENCY)
Facility: OTHER | Age: 40
Discharge: HOME | End: 2025-01-08
Attending: EMERGENCY MEDICINE
Payer: MEDICAID

## 2025-01-08 VITALS
HEIGHT: 67 IN | BODY MASS INDEX: 32.33 KG/M2 | HEART RATE: 76 BPM | DIASTOLIC BLOOD PRESSURE: 67 MMHG | SYSTOLIC BLOOD PRESSURE: 108 MMHG | WEIGHT: 206 LBS | OXYGEN SATURATION: 99 % | RESPIRATION RATE: 17 BRPM | TEMPERATURE: 99 F

## 2025-01-08 DIAGNOSIS — R05.9 COUGH: ICD-10-CM

## 2025-01-08 DIAGNOSIS — B34.9 VIRAL SYNDROME: Primary | ICD-10-CM

## 2025-01-08 PROCEDURE — 99283 EMERGENCY DEPT VISIT LOW MDM: CPT | Mod: 25

## 2025-01-08 PROCEDURE — 87635 SARS-COV-2 COVID-19 AMP PRB: CPT | Performed by: EMERGENCY MEDICINE

## 2025-01-08 PROCEDURE — 81025 URINE PREGNANCY TEST: CPT | Performed by: PHYSICIAN ASSISTANT

## 2025-01-08 PROCEDURE — 25000003 PHARM REV CODE 250: Performed by: PHYSICIAN ASSISTANT

## 2025-01-08 RX ORDER — BENZONATATE 100 MG/1
100 CAPSULE ORAL 3 TIMES DAILY PRN
Qty: 20 CAPSULE | Refills: 0 | Status: SHIPPED | OUTPATIENT
Start: 2025-01-08 | End: 2025-01-18

## 2025-01-08 RX ORDER — IBUPROFEN 600 MG/1
600 TABLET ORAL
Status: COMPLETED | OUTPATIENT
Start: 2025-01-08 | End: 2025-01-08

## 2025-01-08 RX ORDER — ACETAMINOPHEN 500 MG
1000 TABLET ORAL
Status: COMPLETED | OUTPATIENT
Start: 2025-01-08 | End: 2025-01-08

## 2025-01-08 RX ORDER — BENZONATATE 100 MG/1
100 CAPSULE ORAL
Status: COMPLETED | OUTPATIENT
Start: 2025-01-08 | End: 2025-01-08

## 2025-01-08 RX ADMIN — IBUPROFEN 600 MG: 600 TABLET, FILM COATED ORAL at 10:01

## 2025-01-08 RX ADMIN — ACETAMINOPHEN 1000 MG: 500 TABLET, FILM COATED ORAL at 10:01

## 2025-01-08 RX ADMIN — BENZONATATE 100 MG: 100 CAPSULE ORAL at 10:01

## 2025-01-08 NOTE — Clinical Note
"Ofelia"Audelia Ogden was seen and treated in our emergency department on 1/8/2025.  She may return to work on 01/13/2025.       If you have any questions or concerns, please don't hesitate to call.      Prema Pittman PA-C"

## 2025-01-08 NOTE — ED PROVIDER NOTES
Encounter Date: 1/8/2025       History     Chief Complaint   Patient presents with    General Illness     Pt reports intermittent body aches, chills, runny nose x 2 weeks     Patient is a 39-year-old female with history of depression who presents to the emergency department with body aches and chills.  She reports over the last 2 weeks she has had a cough that has persisted.  Reports over the last 2 days she has developed worsening congestion and rhinorrhea.  Reports body aches and chills that just started over the last 2 days.  Denies definite fevers but reports she feels as if she has been having fevers.  Denies urinary symptoms.  Denies vomiting or diarrhea.  Denies chest pain or shortness of breath.  Denies lower extremity swelling.  Does report having generalized weakness.        Review of patient's allergies indicates:  No Known Allergies  Past Medical History:   Diagnosis Date    Depression      Past Surgical History:   Procedure Laterality Date    DIAGNOSTIC LAPAROSCOPY N/A 6/18/2022    Procedure: LAPAROSCOPY, DIAGNOSTIC;  Surgeon: Mariposa Kruger MD;  Location: [facility];  Service: OB/GYN;  Laterality: N/A;    LAPAROSCOPIC LYSIS OF ADHESIONS  6/18/2022    Procedure: LYSIS, ADHESIONS, LAPAROSCOPIC;  Surgeon: Mariposa Kruger MD;  Location: [facility];  Service: OB/GYN;;    SALPINGOOPHORECTOMY Right 6/18/2022    Procedure: SALPINGO-OOPHORECTOMY;  Surgeon: Mariposa Kruger MD;  Location: [facility];  Service: OB/GYN;  Laterality: Right;     No family history on file.  Social History     Tobacco Use    Smoking status: Never   Substance Use Topics    Alcohol use: Yes     Comment: occassional    Drug use: No     Review of Systems   Constitutional:  Positive for activity change, appetite change, chills and fatigue.   HENT:  Positive for congestion, postnasal drip and rhinorrhea. Negative for ear discharge, ear pain, sore throat and trouble swallowing.    Respiratory:  Positive for cough.    Cardiovascular:   Negative for chest pain.   Gastrointestinal:  Negative for abdominal pain, blood in stool, constipation, diarrhea, nausea and vomiting.   Genitourinary:  Negative for dysuria, flank pain and hematuria.   Musculoskeletal:  Positive for myalgias. Negative for back pain, neck pain and neck stiffness.   Skin:  Negative for rash and wound.   Neurological:  Positive for weakness (generalized) and light-headedness. Negative for dizziness and headaches.       Physical Exam     Initial Vitals [01/08/25 0936]   BP Pulse Resp Temp SpO2   (!) 103/53 73 18 98.4 °F (36.9 °C) 98 %      MAP       --         Physical Exam    Nursing note and vitals reviewed.  Constitutional: She appears well-developed and well-nourished. She is not diaphoretic.  Non-toxic appearance. No distress.   HENT:   Head: Normocephalic.   Right Ear: Hearing and external ear normal. A middle ear effusion is present.   Left Ear: Hearing and external ear normal. A middle ear effusion is present.   Nose: Mucosal edema and rhinorrhea present. Mouth/Throat: Uvula is midline, oropharynx is clear and moist and mucous membranes are normal. No trismus in the jaw. No uvula swelling. No oropharyngeal exudate.   Eyes: Conjunctivae are normal. Pupils are equal, round, and reactive to light.   Neck: Neck supple.   Normal range of motion.   Full passive range of motion without pain.     Cardiovascular:  Normal rate and regular rhythm.           Pulmonary/Chest: Breath sounds normal. No respiratory distress. She has no wheezes. She has no rhonchi. She has no rales. She exhibits no tenderness.   Abdominal: Abdomen is soft. Bowel sounds are normal. There is no abdominal tenderness.   Musculoskeletal:         General: Normal range of motion.      Cervical back: Full passive range of motion without pain, normal range of motion and neck supple. No rigidity. Normal range of motion.     Lymphadenopathy:     She has no cervical adenopathy.   Neurological: She is alert and oriented to  person, place, and time. She has normal strength. No cranial nerve deficit or sensory deficit. GCS score is 15. GCS eye subscore is 4. GCS verbal subscore is 5. GCS motor subscore is 6.   Skin: Skin is warm and dry. Capillary refill takes less than 2 seconds.   Psychiatric: She has a normal mood and affect.         ED Course   Procedures  Labs Reviewed   SARS-COV-2 RDRP GENE       Result Value    POC Rapid COVID Negative       Acceptable Yes     POCT INFLUENZA A/B MOLECULAR    POC Molecular Influenza A Ag Negative      POC Molecular Influenza B Ag Negative       Acceptable Yes     POCT URINE PREGNANCY    POC Preg Test, Ur Negative       Acceptable Yes            Imaging Results              X-Ray Chest PA And Lateral (In process)  Result time 01/08/25 10:15:16                     Medications   benzonatate capsule 100 mg (100 mg Oral Given 1/8/25 1013)   acetaminophen tablet 1,000 mg (1,000 mg Oral Given 1/8/25 1013)   ibuprofen tablet 600 mg (600 mg Oral Given 1/8/25 1014)     Medical Decision Making  Urgent evaluation of a 39-year-old female who presents to the emergency department with cough and body aches.  Patient is afebrile and nontoxic appearing.  Lungs are clear to auscultation.  Nasal mucosal edema with posterior oropharyngeal erythema.  No exudate.  Uvula is midline.  No nuchal rigidity.  Benign belly.  Suspect viral syndrome.  Will check for COVID and flu.  Will obtain chest x-ray.    10:51 AM  Negative covid, flu, and no pneumonia on chest xray.  Will obtain orthostatic vital signs.    11:27 AM  Patient is not orthostatic.  She is encouraged to drink plenty of fluids and rest.  Advised to follow up with PCP in 24-48 hours.  Given strict return precautions.    Amount and/or Complexity of Data Reviewed  Labs: ordered.  Radiology: ordered.    Risk  OTC drugs.  Prescription drug management.                                      Clinical Impression:  Final  diagnoses:  [R05.9] Cough                 Torrey-Prema Bowens, RUPA  01/08/25 1129

## 2025-01-08 NOTE — DISCHARGE INSTRUCTIONS
Rest    Stay hydrated    Rotate tylenol and motrin    Follow up with PCP.    Return to ED with any worsening symptoms or concerns.

## 2025-01-22 ENCOUNTER — PATIENT OUTREACH (OUTPATIENT)
Facility: OTHER | Age: 40
End: 2025-01-22
Payer: MEDICAID

## 2025-01-29 NOTE — PROGRESS NOTES
ED navigator attempted multiple outreaches to patient regarding recent emergency room visit. Unable to contact, closed.

## 2025-03-09 ENCOUNTER — HOSPITAL ENCOUNTER (INPATIENT)
Facility: HOSPITAL | Age: 40
LOS: 3 days | Discharge: HOME | DRG: 885 | End: 2025-03-12
Attending: PSYCHIATRY & NEUROLOGY | Admitting: PSYCHIATRY & NEUROLOGY
Payer: MEDICAID

## 2025-03-09 ENCOUNTER — HOSPITAL ENCOUNTER (EMERGENCY)
Facility: OTHER | Age: 40
Discharge: ANOTHER HOSPITAL | End: 2025-03-09
Attending: EMERGENCY MEDICINE
Payer: MEDICAID

## 2025-03-09 VITALS
WEIGHT: 206 LBS | BODY MASS INDEX: 32.33 KG/M2 | SYSTOLIC BLOOD PRESSURE: 129 MMHG | TEMPERATURE: 98 F | OXYGEN SATURATION: 100 % | HEIGHT: 67 IN | RESPIRATION RATE: 16 BRPM | HEART RATE: 70 BPM | DIASTOLIC BLOOD PRESSURE: 62 MMHG

## 2025-03-09 DIAGNOSIS — F41.9 ANXIETY: ICD-10-CM

## 2025-03-09 DIAGNOSIS — F32.A DEPRESSION, UNSPECIFIED DEPRESSION TYPE: Primary | ICD-10-CM

## 2025-03-09 DIAGNOSIS — F15.94 AMPHETAMINE-INDUCED MOOD DISORDER: Primary | ICD-10-CM

## 2025-03-09 LAB
ALBUMIN SERPL BCP-MCNC: 3.7 G/DL (ref 3.5–5.2)
ALP SERPL-CCNC: 34 U/L (ref 40–150)
ALT SERPL W/O P-5'-P-CCNC: 15 U/L (ref 10–44)
AMPHET+METHAMPHET UR QL: ABNORMAL
ANION GAP SERPL CALC-SCNC: 9 MMOL/L (ref 8–16)
APAP SERPL-MCNC: <3 UG/ML (ref 10–20)
AST SERPL-CCNC: 16 U/L (ref 10–40)
B-HCG UR QL: NEGATIVE
BACTERIA #/AREA URNS HPF: ABNORMAL /HPF
BARBITURATES UR QL SCN>200 NG/ML: NEGATIVE
BASOPHILS # BLD AUTO: 0.01 K/UL (ref 0–0.2)
BASOPHILS NFR BLD: 0.3 % (ref 0–1.9)
BENZODIAZ UR QL SCN>200 NG/ML: ABNORMAL
BILIRUB SERPL-MCNC: 0.4 MG/DL (ref 0.1–1)
BILIRUB UR QL STRIP: NEGATIVE
BUN SERPL-MCNC: 9 MG/DL (ref 6–20)
BZE UR QL SCN: NEGATIVE
CALCIUM SERPL-MCNC: 8.6 MG/DL (ref 8.7–10.5)
CANNABINOIDS UR QL SCN: ABNORMAL
CHLORIDE SERPL-SCNC: 110 MMOL/L (ref 95–110)
CLARITY UR: ABNORMAL
CO2 SERPL-SCNC: 20 MMOL/L (ref 23–29)
COLOR UR: COLORLESS
CREAT SERPL-MCNC: 0.7 MG/DL (ref 0.5–1.4)
CREAT UR-MCNC: 37.2 MG/DL (ref 15–325)
CTP QC/QA: YES
DIFFERENTIAL METHOD BLD: ABNORMAL
EOSINOPHIL # BLD AUTO: 0.1 K/UL (ref 0–0.5)
EOSINOPHIL NFR BLD: 2.1 % (ref 0–8)
ERYTHROCYTE [DISTWIDTH] IN BLOOD BY AUTOMATED COUNT: 16.2 % (ref 11.5–14.5)
EST. GFR  (NO RACE VARIABLE): >60 ML/MIN/1.73 M^2
ETHANOL SERPL-MCNC: <10 MG/DL
GLUCOSE SERPL-MCNC: 84 MG/DL (ref 70–110)
GLUCOSE UR QL STRIP: NEGATIVE
HCT VFR BLD AUTO: 31.7 % (ref 37–48.5)
HGB BLD-MCNC: 9.9 G/DL (ref 12–16)
HGB UR QL STRIP: ABNORMAL
IMM GRANULOCYTES # BLD AUTO: 0.01 K/UL (ref 0–0.04)
IMM GRANULOCYTES NFR BLD AUTO: 0.3 % (ref 0–0.5)
KETONES UR QL STRIP: NEGATIVE
LEUKOCYTE ESTERASE UR QL STRIP: NEGATIVE
LYMPHOCYTES # BLD AUTO: 1.8 K/UL (ref 1–4.8)
LYMPHOCYTES NFR BLD: 53.9 % (ref 18–48)
MCH RBC QN AUTO: 27.5 PG (ref 27–31)
MCHC RBC AUTO-ENTMCNC: 31.2 G/DL (ref 32–36)
MCV RBC AUTO: 88 FL (ref 82–98)
METHADONE UR QL SCN>300 NG/ML: NEGATIVE
MICROSCOPIC COMMENT: ABNORMAL
MONOCYTES # BLD AUTO: 0.4 K/UL (ref 0.3–1)
MONOCYTES NFR BLD: 11.7 % (ref 4–15)
NEUTROPHILS # BLD AUTO: 1.1 K/UL (ref 1.8–7.7)
NEUTROPHILS NFR BLD: 31.7 % (ref 38–73)
NITRITE UR QL STRIP: NEGATIVE
NRBC BLD-RTO: 0 /100 WBC
OPIATES UR QL SCN: NEGATIVE
PCP UR QL SCN>25 NG/ML: NEGATIVE
PH UR STRIP: 7 [PH] (ref 5–8)
PLATELET # BLD AUTO: 284 K/UL (ref 150–450)
PMV BLD AUTO: 10.1 FL (ref 9.2–12.9)
POTASSIUM SERPL-SCNC: 3.3 MMOL/L (ref 3.5–5.1)
PROT SERPL-MCNC: 7.3 G/DL (ref 6–8.4)
PROT UR QL STRIP: NEGATIVE
RBC # BLD AUTO: 3.6 M/UL (ref 4–5.4)
RBC #/AREA URNS HPF: 3 /HPF (ref 0–4)
SODIUM SERPL-SCNC: 139 MMOL/L (ref 136–145)
SP GR UR STRIP: 1.01 (ref 1–1.03)
SQUAMOUS #/AREA URNS HPF: 3 /HPF
TOXICOLOGY INFORMATION: ABNORMAL
UNIDENT CRYS URNS QL MICRO: ABNORMAL
URN SPEC COLLECT METH UR: ABNORMAL
UROBILINOGEN UR STRIP-ACNC: NEGATIVE EU/DL
WBC # BLD AUTO: 3.34 K/UL (ref 3.9–12.7)
WBC #/AREA URNS HPF: 3 /HPF (ref 0–5)

## 2025-03-09 PROCEDURE — 99285 EMERGENCY DEPT VISIT HI MDM: CPT

## 2025-03-09 PROCEDURE — 80053 COMPREHEN METABOLIC PANEL: CPT

## 2025-03-09 PROCEDURE — 25000003 PHARM REV CODE 250

## 2025-03-09 PROCEDURE — 80143 DRUG ASSAY ACETAMINOPHEN: CPT

## 2025-03-09 PROCEDURE — 25000003 PHARM REV CODE 250: Performed by: PSYCHIATRY & NEUROLOGY

## 2025-03-09 PROCEDURE — 85025 COMPLETE CBC W/AUTO DIFF WBC: CPT

## 2025-03-09 PROCEDURE — 81025 URINE PREGNANCY TEST: CPT

## 2025-03-09 PROCEDURE — 11400000 HC PSYCH PRIVATE ROOM

## 2025-03-09 PROCEDURE — 80307 DRUG TEST PRSMV CHEM ANLYZR: CPT

## 2025-03-09 PROCEDURE — 82077 ASSAY SPEC XCP UR&BREATH IA: CPT

## 2025-03-09 PROCEDURE — 81000 URINALYSIS NONAUTO W/SCOPE: CPT | Mod: 59

## 2025-03-09 RX ORDER — OLANZAPINE 10 MG/2ML
10 INJECTION, POWDER, FOR SOLUTION INTRAMUSCULAR EVERY 8 HOURS PRN
Status: DISCONTINUED | OUTPATIENT
Start: 2025-03-09 | End: 2025-03-12 | Stop reason: HOSPADM

## 2025-03-09 RX ORDER — TALC
6 POWDER (GRAM) TOPICAL NIGHTLY PRN
Status: DISCONTINUED | OUTPATIENT
Start: 2025-03-09 | End: 2025-03-12 | Stop reason: HOSPADM

## 2025-03-09 RX ORDER — OLANZAPINE 10 MG/1
10 TABLET ORAL EVERY 8 HOURS PRN
Status: DISCONTINUED | OUTPATIENT
Start: 2025-03-09 | End: 2025-03-12 | Stop reason: HOSPADM

## 2025-03-09 RX ORDER — HYDROXYZINE HYDROCHLORIDE 25 MG/1
50 TABLET, FILM COATED ORAL EVERY 6 HOURS PRN
Status: DISCONTINUED | OUTPATIENT
Start: 2025-03-09 | End: 2025-03-12 | Stop reason: HOSPADM

## 2025-03-09 RX ORDER — ACETAMINOPHEN 325 MG/1
650 TABLET ORAL
Status: COMPLETED | OUTPATIENT
Start: 2025-03-09 | End: 2025-03-09

## 2025-03-09 RX ORDER — HYDROXYZINE PAMOATE 25 MG/1
25 CAPSULE ORAL
Status: COMPLETED | OUTPATIENT
Start: 2025-03-09 | End: 2025-03-09

## 2025-03-09 RX ORDER — DIAZEPAM 2 MG/1
2 TABLET ORAL
Status: COMPLETED | OUTPATIENT
Start: 2025-03-09 | End: 2025-03-09

## 2025-03-09 RX ORDER — IBUPROFEN 200 MG
1 TABLET ORAL DAILY
Status: DISCONTINUED | OUTPATIENT
Start: 2025-03-10 | End: 2025-03-12

## 2025-03-09 RX ORDER — DIAZEPAM 5 MG/1
5 TABLET ORAL
Status: COMPLETED | OUTPATIENT
Start: 2025-03-09 | End: 2025-03-09

## 2025-03-09 RX ADMIN — HYDROXYZINE PAMOATE 25 MG: 25 CAPSULE ORAL at 08:03

## 2025-03-09 RX ADMIN — Medication 6 MG: at 09:03

## 2025-03-09 RX ADMIN — ACETAMINOPHEN 650 MG: 325 TABLET, FILM COATED ORAL at 07:03

## 2025-03-09 RX ADMIN — DIAZEPAM 5 MG: 5 TABLET ORAL at 12:03

## 2025-03-09 RX ADMIN — OLANZAPINE 10 MG: 10 TABLET, FILM COATED ORAL at 09:03

## 2025-03-09 RX ADMIN — DIAZEPAM 2 MG: 2 TABLET ORAL at 02:03

## 2025-03-09 RX ADMIN — SODIUM CHLORIDE 1000 ML: 9 INJECTION, SOLUTION INTRAVENOUS at 07:03

## 2025-03-09 NOTE — ED NOTES
Pt back in stretcher with family member at bedside. Pt tearful. Box of kleenex given at this time.

## 2025-03-09 NOTE — ED NOTES
Pt found sitting on floor in corner of room, states she feels like its her safe place. Pt moved to bed and MD made aware. Pt coached through breathing at this time, she still denies SI or HI just admits to being confused about why she's feeling this way.

## 2025-03-09 NOTE — ED TRIAGE NOTES
Pt brought in by crisis unit after having multiple anxiety attacks while at work today, pt states that she has similar episodes yearly around her late son's birthday. Pt denies SI/HI. Pt has hx of depression and anxiety, compliant with prescribed medication.

## 2025-03-09 NOTE — ED NOTES
Pt sitting up on stretcher, calm and cooperative, in NAD, in maroon scrubs. Sitter Elli at bedside documenting Q15 min checks

## 2025-03-09 NOTE — ED NOTES
Pt belongings:  One shirt   One pair of pants  One [employer] bag  One purse     Pt Valuables:  One cell phone

## 2025-03-09 NOTE — ED PROVIDER NOTES
Encounter Date: 3/9/2025       History     Chief Complaint   Patient presents with    Anxiety     Pt brought in by crisis unit after having multiple anxiety attacks while at work today, pt states that she has similar episodes yearly around her late son's birthday.  Pt denies SI/HI. Pt has hx of depression and anxiety, compliant with prescribed medication.     The history is provided by the patient, medical records and a parent.   39-year-old female with a past medical history of anxiety and depression presenting to the emergency department today for evaluation of anxiety attacks today.  Reports she frequently has anxiety attacks related around her late sons passing.  Today at work patient was having an attack in the crisis unit was called and brought her in for evaluation.  Patient was states I feel like I need to jump out of my skin.  Reports she takes Lexapro and has been compliant with her medication.  Patient was also notes she recently stopped talking to her therapist.  Denies any SI, HI, auditory/visual/tactile hallucinations.  Patient was states she feels like her heart is racing otherwise denies any fever, chest pain, shortness for breath, nausea, vomiting, diarrhea, abdominal pain, headache, dizziness or other associated symptoms.  Review of patient's allergies indicates:  No Known Allergies  Past Medical History:   Diagnosis Date    Depression      Past Surgical History:   Procedure Laterality Date    DIAGNOSTIC LAPAROSCOPY N/A 6/18/2022    Procedure: LAPAROSCOPY, DIAGNOSTIC;  Surgeon: Mariposa Kruger MD;  Location: [facility];  Service: OB/GYN;  Laterality: N/A;    LAPAROSCOPIC LYSIS OF ADHESIONS  6/18/2022    Procedure: LYSIS, ADHESIONS, LAPAROSCOPIC;  Surgeon: Mariposa Kruger MD;  Location: [facility];  Service: OB/GYN;;    SALPINGOOPHORECTOMY Right 6/18/2022    Procedure: SALPINGO-OOPHORECTOMY;  Surgeon: Mariposa Kruger MD;  Location: [facility];  Service: OB/GYN;  Laterality: Right;     No  family history on file.  Social History[1]  Review of Systems   Constitutional:  Negative for chills, diaphoresis, fatigue and fever.   HENT:  Negative for congestion, rhinorrhea and sore throat.    Eyes:  Negative for redness and visual disturbance.   Respiratory:  Negative for cough and shortness of breath.    Cardiovascular:  Negative for chest pain, palpitations and leg swelling.   Gastrointestinal:  Negative for abdominal pain, diarrhea, nausea and vomiting.   Genitourinary:  Negative for difficulty urinating, dysuria, flank pain and frequency.   Musculoskeletal:  Negative for arthralgias, back pain, myalgias, neck pain and neck stiffness.   Skin:  Negative for rash.   Neurological:  Negative for dizziness, weakness, light-headedness and headaches.   Hematological:  Does not bruise/bleed easily.   Psychiatric/Behavioral:  Negative for agitation, confusion, decreased concentration, dysphoric mood, hallucinations, self-injury, sleep disturbance and suicidal ideas. The patient is nervous/anxious.        Physical Exam     Initial Vitals [03/09/25 1208]   BP Pulse Resp Temp SpO2   138/78 71 20 98.5 °F (36.9 °C) 100 %      MAP       --         Physical Exam    Nursing note and vitals reviewed.  Constitutional: She appears well-developed and well-nourished. She is not diaphoretic. She appears distressed.   HENT:   Head: Normocephalic and atraumatic.   Right Ear: External ear normal.   Left Ear: External ear normal.   Nose: Nose normal. Mouth/Throat: Oropharynx is clear and moist.   Eyes: Conjunctivae and EOM are normal. Pupils are equal, round, and reactive to light. Right eye exhibits no discharge. Left eye exhibits no discharge.   Neck: Neck supple.   Normal range of motion.   Full passive range of motion without pain.     Cardiovascular:  Normal rate, regular rhythm, normal heart sounds and normal pulses.     Exam reveals no distant heart sounds and no friction rub.       Pulmonary/Chest: Effort normal and breath  sounds normal. No respiratory distress.   Abdominal: Abdomen is soft. Bowel sounds are normal. She exhibits no distension, no pulsatile midline mass and no mass. There is no splenomegaly or hepatomegaly. There is no abdominal tenderness.   No right CVA tenderness.  No left CVA tenderness. There is no rebound and no guarding.   Musculoskeletal:         General: Normal range of motion.      Right shoulder: Normal.      Left shoulder: Normal.      Right elbow: Normal.      Left elbow: Normal.      Right wrist: Normal.      Left wrist: Normal.      Right hand: Normal.      Left hand: Normal.      Cervical back: Normal, full passive range of motion without pain, normal range of motion and neck supple.      Thoracic back: Normal.      Lumbar back: Normal.      Right hip: Normal.      Left hip: Normal.      Right knee: Normal.      Left knee: Normal.      Right lower leg: Normal.      Left lower leg: Normal.      Right ankle: Normal.      Left ankle: Normal.      Right foot: Normal.      Left foot: Normal.     Neurological: She is alert and oriented to person, place, and time. She has normal strength. No cranial nerve deficit or sensory deficit. Gait normal.   She is awake, alert, and oriented x3. PERRL. EOM's Intact. Gross visual acuity is intact. No tongue deviation or slurred speech. Bilateral facial movement is symmetrical. Symmetrical shoulder shrug and head moves appropriately side to side. Sensation is intact and symmetric to face, upper, and lower extremities. Patient hears commands and appropriately responds. Strength is 5/5 UE/LE bilaterally. No truncal ataxia. Ambulates with a steady gait.  Normal finger-to-nose.  Cervical Nerve Exam Normal: Equal strength with upper extremities (thumbs up/down/side, fingers spread, wrist and elbow flexion/extension, arms crossed).      Skin: Skin is warm and dry. Capillary refill takes less than 2 seconds. No bruising, no ecchymosis and no rash noted. No pallor.   Psychiatric:  "Her speech is normal. Thought content normal. Her mood appears anxious. Her affect is not angry, not blunt, not labile and not inappropriate. She is not agitated, not aggressive, not hyperactive, not slowed, not withdrawn and not combative. Thought content is not paranoid and not delusional. She exhibits a depressed mood. She expresses no homicidal and no suicidal ideation. She expresses no suicidal plans and no homicidal plans.   Patient appears very anxious, speaking fast and crying. Patient notes she "feels like crawling out of her skin". States she "doesn't understand why her son was taken". Patient notes she feels like she is tired of having to be "the strong one" for her other son at home and that she doesn't have anyone to talk too.   Denies SI/HI or plans, denies AH/VH/TH.    Patient is able to be calmed down verbally initially.     When patient is left alone patient will crawl into a corner and cry and have to be assisted back to the bed. Difficult to verbally calm patient down. When left room again patient returned to corner and in fetal position crying and screaming.              ED Course   Procedures  Labs Reviewed   CBC W/ AUTO DIFFERENTIAL - Abnormal       Result Value    WBC 3.34 (*)     RBC 3.60 (*)     Hemoglobin 9.9 (*)     Hematocrit 31.7 (*)     MCV 88      MCH 27.5      MCHC 31.2 (*)     RDW 16.2 (*)     Platelets 284      MPV 10.1      Immature Granulocytes 0.3      Gran # (ANC) 1.1 (*)     Immature Grans (Abs) 0.01      Lymph # 1.8      Mono # 0.4      Eos # 0.1      Baso # 0.01      nRBC 0      Gran % 31.7 (*)     Lymph % 53.9 (*)     Mono % 11.7      Eosinophil % 2.1      Basophil % 0.3      Differential Method Automated     COMPREHENSIVE METABOLIC PANEL - Abnormal    Sodium 139      Potassium 3.3 (*)     Chloride 110      CO2 20 (*)     Glucose 84      BUN 9      Creatinine 0.7      Calcium 8.6 (*)     Total Protein 7.3      Albumin 3.7      Total Bilirubin 0.4      Alkaline Phosphatase 34 " (*)     AST 16      ALT 15      eGFR >60      Anion Gap 9     URINALYSIS, REFLEX TO URINE CULTURE - Abnormal    Specimen UA Urine, Clean Catch      Color, UA Colorless (*)     Appearance, UA Hazy (*)     pH, UA 7.0      Specific Gravity, UA 1.010      Protein, UA Negative      Glucose, UA Negative      Ketones, UA Negative      Bilirubin (UA) Negative      Occult Blood UA 3+ (*)     Nitrite, UA Negative      Urobilinogen, UA Negative      Leukocytes, UA Negative      Narrative:     Specimen Source->Urine   DRUG SCREEN PANEL, URINE EMERGENCY - Abnormal    Benzodiazepines Presumptive Positive (*)     Methadone metabolites Negative      Cocaine (Metab.) Negative      Opiate Scrn, Ur Negative      Barbiturate Screen, Ur Negative      Amphetamine Screen, Ur Presumptive Positive (*)     THC Presumptive Positive (*)     Phencyclidine Negative      Creatinine, Urine 37.2      Toxicology Information SEE COMMENT      Narrative:     Specimen Source->Urine   ACETAMINOPHEN LEVEL - Abnormal    Acetaminophen (Tylenol), Serum <3.0 (*)    URINALYSIS MICROSCOPIC - Abnormal    RBC, UA 3      WBC, UA 3      Bacteria Moderate (*)     Squam Epithel, UA 3      Unclass Melanie UA Rare      Microscopic Comment SEE COMMENT      Narrative:     Specimen Source->Urine   ALCOHOL,MEDICAL (ETHANOL)    Alcohol, Serum <10     POCT URINE PREGNANCY    POC Preg Test, Ur Negative       Acceptable Yes            Imaging Results    None          Medications   diazePAM tablet 5 mg (5 mg Oral Given 3/9/25 1255)   diazePAM tablet 2 mg (2 mg Oral Given 3/9/25 1413)     Medical Decision Making  39-year-old female with a past medical history of anxiety and depression presenting to the emergency department today for evaluation of anxiety attacks today.  Reports she frequently has anxiety attacks related around her late sons passing.  Today at work patient was having an attack in the crisis unit was called and brought her in for evaluation.  Patient  "was states" I feel like I need to jump out of my skin".  Reports she takes Lexapro and has been compliant with her medication.  Patient was also notes she recently stopped talking to her therapist.  Denies any SI, HI, auditory/visual/tactile hallucinations.  Patient was states she feels like her heart is racing otherwise denies any fever, chest pain, shortness for breath, nausea, vomiting, diarrhea, abdominal pain, headache, dizziness or other associated symptoms.  Patient's chart and medical history reviewed.  Patient's vitals reviewed.  They are afebrile, no respiratory distress, nontoxic-appearing in the ED.  Differential diagnosis is considered     Obtaining collateral, patient mother and father note patient was actually having increased episodes of these panic attacks more so almost daily or every other day for the past few weeks up until today which they note it was the most severe panic attack.  Patient was family notes that the incident with her sons loss occurred in 2017 in the patient still has not gotten over this issue.   On re-evaluate after valium 7 mg , patient is mildly anxious now with slower speech, however appears severely depressed. Patient family also note this depression has been more evident over the past few weeks. They state this depression has been affecting patient ADL's and patient will not divulge this information to me. I discuss to the patient that I believe this elevation in patient symptoms and severe depression need to be explored with a psychiatrist or psychiatric professional as it is disabling at this point. Plan to [facility] for psychiatric evaluation and treatment.  More per ED course. Patient is medically cleared at this time.        Amount and/or Complexity of Data Reviewed  Labs: ordered. Decision-making details documented in ED Course.    Risk  Prescription drug management.               ED Course as of 03/09/25 1741   Sun Mar 09, 2025   1607 [facility] formed signed. Patient family " agree with the decision as patient per family has not been taking care of herself in her ADL's.  [AF]   1624 Benzodiazepines(!): Presumptive Positive  Possibly from Valium given prior to screening.  [AF]   1625 Amphetamines, Urine(!): Presumptive Positive [AF]   1625 Marijuana (THC) Metabolite(!): Presumptive Positive [AF]   1625 hCG Qualitative, Urine: Negative [AF]   1647 NITRITE UA: Negative [AF]   1647 Leukocyte Esterase, UA: Negative [AF]      ED Course User Index  [AF] Chucho Pereira PA-C       Medically cleared for psychiatry placement: 3/9/2025  5:36 PM                   Clinical Impression:  Final diagnoses:  [F41.9] Anxiety  [F32.A] Depression, unspecified depression type (Primary)          ED Disposition Condition    Transfer to Psych Facility Stable          ED Prescriptions    None       Follow-up Information    None            [1]   Social History  Tobacco Use    Smoking status: Never   Substance Use Topics    Alcohol use: Yes     Comment: occassional    Drug use: No        Chucho Pereira PA-C  03/09/25 7858

## 2025-03-09 NOTE — ED NOTES
Pt mother at bedside and states that she would like to be notified of when the pt is transferred, pt agreed. Mother's contact info in the chart. Lisbeth Calloway at bedside documenting Q15 min checks

## 2025-03-09 NOTE — LETTER
March 12, 2025         [address] [facility] [address]  Phone: [phone]  Fax: [phone]       Patient: Ofelia Ogden   YOB: 1985  Date of Visit: 03/12/2025    To Whom It May Concern:    Carlos Ogden  was at [facility] on 03/09-12-/2025. The patient may return to work/school on 03/13/2025 with no restrictions. If you have any questions or concerns, or if I can be of further assistance, please do not hesitate to contact me.    Sincerely,    DR. SOLANGE Grover

## 2025-03-09 NOTE — ED NOTES
Pt post EKG found in floor corner. Attempted to coax pt back to bed with little results. Provider and team notified.

## 2025-03-10 PROBLEM — F19.90 SUBSTANCE USE DISORDER: Status: ACTIVE | Noted: 2025-03-10

## 2025-03-10 PROBLEM — F41.9 ANXIETY: Status: ACTIVE | Noted: 2025-03-10

## 2025-03-10 LAB
ALBUMIN SERPL BCP-MCNC: 3.5 G/DL (ref 3.5–5.2)
ALP SERPL-CCNC: 38 U/L (ref 40–150)
ALT SERPL W/O P-5'-P-CCNC: 14 U/L (ref 10–44)
ANION GAP SERPL CALC-SCNC: 7 MMOL/L (ref 8–16)
AST SERPL-CCNC: 13 U/L (ref 10–40)
BASOPHILS # BLD AUTO: 0.02 K/UL (ref 0–0.2)
BASOPHILS NFR BLD: 0.6 % (ref 0–1.9)
BILIRUB SERPL-MCNC: 0.3 MG/DL (ref 0.1–1)
BUN SERPL-MCNC: 10 MG/DL (ref 6–20)
CALCIUM SERPL-MCNC: 8.3 MG/DL (ref 8.7–10.5)
CHLORIDE SERPL-SCNC: 111 MMOL/L (ref 95–110)
CO2 SERPL-SCNC: 23 MMOL/L (ref 23–29)
CREAT SERPL-MCNC: 0.7 MG/DL (ref 0.5–1.4)
DIFFERENTIAL METHOD BLD: ABNORMAL
EOSINOPHIL # BLD AUTO: 0.1 K/UL (ref 0–0.5)
EOSINOPHIL NFR BLD: 2.8 % (ref 0–8)
ERYTHROCYTE [DISTWIDTH] IN BLOOD BY AUTOMATED COUNT: 16.8 % (ref 11.5–14.5)
EST. GFR  (NO RACE VARIABLE): >60 ML/MIN/1.73 M^2
FERRITIN SERPL-MCNC: 7 NG/ML (ref 20–300)
FOLATE SERPL-MCNC: 8.8 NG/ML (ref 4–24)
GLUCOSE SERPL-MCNC: 90 MG/DL (ref 70–110)
HCT VFR BLD AUTO: 31.4 % (ref 37–48.5)
HGB BLD-MCNC: 10.1 G/DL (ref 12–16)
IMM GRANULOCYTES # BLD AUTO: 0 K/UL (ref 0–0.04)
IMM GRANULOCYTES NFR BLD AUTO: 0 % (ref 0–0.5)
IRON SERPL-MCNC: 25 UG/DL (ref 30–160)
LYMPHOCYTES # BLD AUTO: 1.6 K/UL (ref 1–4.8)
LYMPHOCYTES NFR BLD: 49.8 % (ref 18–48)
MAGNESIUM SERPL-MCNC: 1.9 MG/DL (ref 1.6–2.6)
MCH RBC QN AUTO: 28 PG (ref 27–31)
MCHC RBC AUTO-ENTMCNC: 32.2 G/DL (ref 32–36)
MCV RBC AUTO: 87 FL (ref 82–98)
MONOCYTES # BLD AUTO: 0.4 K/UL (ref 0.3–1)
MONOCYTES NFR BLD: 12 % (ref 4–15)
NEUTROPHILS # BLD AUTO: 1.1 K/UL (ref 1.8–7.7)
NEUTROPHILS NFR BLD: 34.8 % (ref 38–73)
NRBC BLD-RTO: 0 /100 WBC
PLATELET # BLD AUTO: 302 K/UL (ref 150–450)
PMV BLD AUTO: 10.3 FL (ref 9.2–12.9)
POTASSIUM SERPL-SCNC: 3.4 MMOL/L (ref 3.5–5.1)
PROT SERPL-MCNC: 6.9 G/DL (ref 6–8.4)
RBC # BLD AUTO: 3.61 M/UL (ref 4–5.4)
SATURATED IRON: 6 % (ref 20–50)
SODIUM SERPL-SCNC: 141 MMOL/L (ref 136–145)
TOTAL IRON BINDING CAPACITY: 411 UG/DL (ref 250–450)
TRANSFERRIN SERPL-MCNC: 278 MG/DL (ref 200–375)
WBC # BLD AUTO: 3.25 K/UL (ref 3.9–12.7)

## 2025-03-10 PROCEDURE — 80061 LIPID PANEL: CPT | Performed by: PSYCHIATRY & NEUROLOGY

## 2025-03-10 PROCEDURE — 82746 ASSAY OF FOLIC ACID SERUM: CPT | Performed by: PHYSICIAN ASSISTANT

## 2025-03-10 PROCEDURE — 11400000 HC PSYCH PRIVATE ROOM

## 2025-03-10 PROCEDURE — 25000003 PHARM REV CODE 250: Performed by: PSYCHIATRY & NEUROLOGY

## 2025-03-10 PROCEDURE — 36415 COLL VENOUS BLD VENIPUNCTURE: CPT | Performed by: PHYSICIAN ASSISTANT

## 2025-03-10 PROCEDURE — 83735 ASSAY OF MAGNESIUM: CPT | Performed by: PHYSICIAN ASSISTANT

## 2025-03-10 PROCEDURE — 80053 COMPREHEN METABOLIC PANEL: CPT | Performed by: PHYSICIAN ASSISTANT

## 2025-03-10 PROCEDURE — 83540 ASSAY OF IRON: CPT | Performed by: PHYSICIAN ASSISTANT

## 2025-03-10 PROCEDURE — 83036 HEMOGLOBIN GLYCOSYLATED A1C: CPT | Performed by: PSYCHIATRY & NEUROLOGY

## 2025-03-10 PROCEDURE — 85025 COMPLETE CBC W/AUTO DIFF WBC: CPT | Performed by: PHYSICIAN ASSISTANT

## 2025-03-10 PROCEDURE — 82728 ASSAY OF FERRITIN: CPT | Performed by: PHYSICIAN ASSISTANT

## 2025-03-10 RX ORDER — ESCITALOPRAM OXALATE 5 MG/1
5 TABLET ORAL DAILY
Status: DISCONTINUED | OUTPATIENT
Start: 2025-03-10 | End: 2025-03-11

## 2025-03-10 RX ADMIN — OLANZAPINE 10 MG: 10 TABLET, FILM COATED ORAL at 08:03

## 2025-03-10 RX ADMIN — ESCITALOPRAM OXALATE 5 MG: 5 TABLET, FILM COATED ORAL at 11:03

## 2025-03-10 RX ADMIN — Medication 6 MG: at 08:03

## 2025-03-10 NOTE — PLAN OF CARE
"Patient guarded and withdrawn. Patient isolating in her room. Reporting " I am so tired. I just want to sleep".  Patient accepting meals and medications. Patient reported her son  5 years ago and she has been very depressed since his death.  Patient reported she did not seek grief counseling but she is wanting counseling now. Patient reports on a scale of 1-10 with ten being the highest level of depression. Patient rating depression today a 10/10. On the same scale patient rating anxiety a 6/10. Encouraged patient to attend groups and activities. Patient denies SI/HI no self harming behavior displayed, no aggressive behavior displayed towards others. Patient contracted safety with staff and unit. Denies A/V hallucinations, no interacting with internal stimuli noted. Educated, reviewed  and discussed plan of care and medication regiment with this patient. Discussed and educated with this patient any concerns that needed to be addressed along with the importance of medication compliance  1:1 with this patient. Patient voiced understanding of all teachings.   "

## 2025-03-10 NOTE — ASSESSMENT & PLAN NOTE
Anemia is likely due to Iron deficiency. Most recent hemoglobin and hematocrit are listed below.  Recent Labs     03/09/25  1624   HGB 9.9*   HCT 31.7*     Plan  - due to iron deficiency anemia.  Po Iron.  Pt needs f/u with PCP

## 2025-03-10 NOTE — PLAN OF CARE
Plan of care reviewed. Denies intent to harm self or others at this time. Denies hallucinations and delusions. Patient is staying in bed and is having minimal interaction with staff and peers. Accepts snacks and medications. Gait steady, no falls. Will continue precautions and monitor for safety.

## 2025-03-10 NOTE — NURSING
Patient wheeled to second floor by transport and security. Ambulated steadily onto unit after being scanned with [employer] Proscreen 200. Pt introduced to staff and oriented to unit. Pt irritable. Reluctantly cooperated with full skin assessment. Bruises noted to lower legs, no contraband found.
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
## 2025-03-10 NOTE — CONSULTS
[facility]  Adult Nutrition  Consult Note    SUMMARY     Recommendations  1. Continue regular diet.   2. RD to monitor need for ONS.   3. Frequent weight assessments.   4. RD to follow.    Goals: Patient will meet > 75% ENN prior to RD follow up.  Nutrition Goal Status: new  Communication of RD Recs: other (comment) (POC)    Nutrition Discharge Planning - General healthy diet    Assessment and Plan    Nutrition Problem  Increased nutrient needs    Related to (etiology):   Conditions associated with diagnosis    Signs and Symptoms (as evidenced by):   Polysubstance dependence  UDS (+) for benzos, THC,  amphetamines    Interventions/Recommendations (treatment strategy):  Interventions  1. General healthful diet  2. Commercial beverage medical food supplement therapy (pending)  3. Collaboration by nutrition professional with other providers    Nutrition Diagnosis Status:   New    Malnutrition Assessment  No NFPE - PEC  Reason for Assessment    Reason For Assessment: consult, identified at risk by screening criteria  Diagnosis: psychological disorder (anxiety)  Past Medical History:   Diagnosis Date    Depression      General Information Comments: Consult received for new BHU admit. MST 3 (14-23# weight loss, decreased appetite) also identified. Patient is a 40 yo F who presented to ED with anxiety/mood symptoms. On regular diet. 75% of breakfast consumed thus far today per flowsheets. RD to monitor need for ONS. Patient will meet ENN if meal intake trends continue. No recent significant weight changes identified per chart review. UBW: 12/10/2024 - 206#. However patient was 236# in 2023 (does not meet significant timeline criteria for malnutrition). LBM 3/9. Will continue to monitor for any nutrition related changes.    Nutrition/Diet History    Spiritual, Cultural Beliefs, Oriental orthodox Practices, Values that Affect Care: other (see comments) (none identified)  Food Allergies: NKFA  Factors Affecting  "Nutritional Intake: decreased appetite, depression    Nutrition Related Social Determinants of Health: SDOH: Unable to assess at this time.     Anthropometrics    Height: 5' 7" (170.2 cm)  Height (inches): 67 in  Height Method: Stated  Weight: 90.1 kg (198 lb 10.2 oz)  Weight (lb): 198.64 lb  Weight Method: Standard Scale  Ideal Body Weight (IBW), Female: 135 lb  % Ideal Body Weight, Female (lb): 147.14 %  BMI (Calculated): 31.1  BMI Grade: 30 - 34.9- obesity - grade I  Usual Body Weight (UBW), k.44 kg (12/10/2024)  Weight Change Amount: 7 lb 5.6 oz (- 3.5% in 3 months)  % Usual Body Weight: 96.63     Lab/Procedures/Meds    Pertinent Labs Reviewed: reviewed  Pertinent Labs Comments: K: 3.3 (L), CO2: 22 (L), Ca: 8.6 (L), ALP: 34 (L), UDS (+) for benzos, amphetamines, THC  Pertinent Medications Reviewed: reviewed  Scheduled Meds:   EScitalopram oxalate  5 mg Oral Daily    nicotine  1 patch Transdermal Daily     Estimated/Assessed Needs    Weight Used For Calorie Calculations: 61.4 kg (135 lb 4.4 oz) (IBW)  Energy Calorie Requirements (kcal): 4569-1371  Energy Need Method: Kcal/kg (30-35 kcal/kg IBW)  Protein Requirements: 74-92 g (1.2-1.5 g/kg)  Weight Used For Protein Calculations: 61.4 kg (135 lb 4.4 oz) (IBW)  Fluid Requirements (mL): 1 mL/kcal  Estimated Fluid Requirement Method: RDA Method  RDA Method (mL): 1841     Nutrition Prescription Ordered    Current Diet Order: regular  Oral Nutrition Supplement: none    Evaluation of Received Nutrient/Fluid Intake    I/O: N/A  Energy Calories Required: other (see comments)  Protein Required: other (see comments)  Fluid Required: other (see comments) (HECTOR)  Comments: Pending assessment of full days worth of meal snack intake to determine if patient meeting EEN/EPN  Tolerance: tolerating  % Intake of Estimated Energy Needs: Other: Pending  % Meal Intake: Other: 75% Breakfast    Nutrition Risk    Level of Risk/Frequency of Follow-up: low - moderate (1-2 x per week) "     Monitor and Evaluation    Monitor and Evaluation: Food and beverage intake, Energy intake, Protein intake, Weight, Beliefs and attitudes     Nutrition Follow-Up    RD Follow-up?: Yes    Lori Malik RDN, LDN

## 2025-03-10 NOTE — PLAN OF CARE
Recommendations  1. Continue regular diet.   2. RD to monitor need for ONS.   3. Frequent weight assessments.   4. RD to follow.    Goals: Patient will meet > 75% ENN prior to RD follow up.  Nutrition Goal Status: new

## 2025-03-10 NOTE — NURSING
"Nursing assessment completed. Pt was cooperative. Denies SI/HI currently and denies ever thinking about suicide. Denies suicide attempts. Patient rates anxiety "20/10" and depression "20/10". She mentions multiple anxiety attacks at work and stating " I probably had a mental breakdown". She voices "I don't like people" and "Im a fighter".  Pt says she has 1000 voices in her head sometimes all talking at once. She says this started 4 years ago when she lost her son to bone cancer. Pt smokes marijuana to help with her anxiety and that yesterday she would get high and go to sleep multiple times during the day. This morning she woke up late for work looking for her medication and found some old adderall that was prescribed to her in 2021 and took it because she was drowsy. She mentions a recent promotion at her job, that she loves herself and she is a good mother. Pt mentions she is expecting her first grandchild this coming June. Plan of care reviewed with patient. All admit paper work sign and voiced understanding.         "

## 2025-03-10 NOTE — PSYCH
"Meditation/Education:  Unwind : Meditation Exercise  Patient Response:  "Patient attended session and found the session helpful".              "

## 2025-03-10 NOTE — NURSING
Received report from Jillian ALEJANDRE RN at [facility]. Patient being admitted after having multiple anxiety attacks but denies any SI/HI. Pt states not being able to cope with her anxiety at this time. Pt is complaint with medications and is cooperative.

## 2025-03-10 NOTE — HPI
Patient is a 39 year old female with medical history of depression, anxiety, substance use disorder and anemia who was admitted to [facility] for anxiety attacks.  Hospital medicine consulted for medical management.

## 2025-03-10 NOTE — PLAN OF CARE
Problem: Adult Behavioral Health Plan of Care  Goal: Optimized Coping Skills in Response to Life Stressors  Outcome: Progressing  Intervention: Promote Effective Coping Strategies  Flowsheets (Taken 3/10/2025 1800)  Supportive Measures:   active listening utilized   counseling provided   goal-setting facilitated     PROCESS GROUP:  Pt did not attend group today. PLPC met with pt individually.  PLPC attempted to  discuss with pt on group discussion. Pt was resting in bed quietly. Pt could not keep his eyes opened and kept falling in and out of sleep. PLPC discussed with pt PLPC will come back at a later time and date to discuss group.

## 2025-03-10 NOTE — ED NOTES
Woke pt from sleep to get vitals. Pt complaining of headache. Pt found to be hypotensive, BP checked in both arms, pt adjusted in bed. Repeat vitals taken, still hypotensive. Provider aware, bolus ordered.

## 2025-03-10 NOTE — SUBJECTIVE & OBJECTIVE
Past Medical History:   Diagnosis Date    Depression        Past Surgical History:   Procedure Laterality Date    DIAGNOSTIC LAPAROSCOPY N/A 6/18/2022    Procedure: LAPAROSCOPY, DIAGNOSTIC;  Surgeon: Mariposa Kruger MD;  Location: [facility] OR;  Service: OB/GYN;  Laterality: N/A;    LAPAROSCOPIC LYSIS OF ADHESIONS  6/18/2022    Procedure: LYSIS, ADHESIONS, LAPAROSCOPIC;  Surgeon: Mariposa Kruger MD;  Location: [facility] OR;  Service: OB/GYN;;    SALPINGOOPHORECTOMY Right 6/18/2022    Procedure: SALPINGO-OOPHORECTOMY;  Surgeon: Mariposa Kruger MD;  Location: [facility] OR;  Service: OB/GYN;  Laterality: Right;       Review of patient's allergies indicates:  No Known Allergies    Current Facility-Administered Medications on File Prior to Encounter   Medication    [COMPLETED] acetaminophen tablet 650 mg    [COMPLETED] hydrOXYzine pamoate capsule 25 mg    [COMPLETED] sodium chloride 0.9% bolus 1,000 mL 1,000 mL     Current Outpatient Medications on File Prior to Encounter   Medication Sig    acetaminophen (TYLENOL) 500 MG tablet Take 2 tablets (1,000 mg total) by mouth every 6 (six) hours as needed for Pain.    acetaminophen (TYLENOL) 500 MG tablet Take 2 tablets (1,000 mg total) by mouth every 6 (six) hours as needed.    acetaminophen (TYLENOL) 500 MG tablet Take 1 tablet (500 mg total) by mouth every 6 (six) hours as needed for Pain.    albuterol (VENTOLIN HFA) 90 mcg/actuation inhaler Inhale 2 puffs into the lungs every 6 (six) hours as needed for Wheezing. Rescue    cetirizine (ZYRTEC) 10 MG tablet Take 1 tablet (10 mg total) by mouth once daily.    diclofenac sodium (VOLTAREN) 1 % Gel Apply 2 g topically 3 (three) times daily as needed (muscle spasm pain). Apply 2 grams to affected area 3 times daily as needed    fluticasone propionate (FLONASE) 50 mcg/actuation nasal spray 1 spray (50 mcg total) by Each Nostril route 2 (two) times daily as needed for Rhinitis.    ibuprofen (ADVIL,MOTRIN) 600 MG tablet Take 1 tablet  (600 mg total) by mouth every 6 (six) hours as needed for Pain.    LIDOcaine (LIDODERM) 5 % Apply to affected area. Use as directed. May use 4% lidocaine patch as alternative.    loratadine (CLARITIN) 10 mg tablet Take 1 tablet (10 mg total) by mouth once daily. for 7 days    ondansetron (ZOFRAN) 4 MG tablet Take 1 tablet (4 mg total) by mouth every 6 (six) hours as needed for Nausea.    ondansetron (ZOFRAN-ODT) 4 MG TbDL Take 1 tablet (4 mg total) by mouth every 6 (six) hours as needed.    oxyCODONE (ROXICODONE) 5 MG immediate release tablet Take 1 tablet (5 mg total) by mouth every 4 (four) hours as needed for Pain.    predniSONE (DELTASONE) 10 MG tablet Take 1 tablet (10 mg total) by mouth once daily. Take 4 tabs x 3 days, then take 2 tabs x 3 days, then take 1 tab x 3 days.    senna-docusate 8.6-50 mg (PERICOLACE) 8.6-50 mg per tablet Take 1 tablet by mouth 2 (two) times a day.     Family History    None       Tobacco Use    Smoking status: Never    Smokeless tobacco: Not on file   Substance and Sexual Activity    Alcohol use: Yes     Comment: occassional    Drug use: No    Sexual activity: Not on file     Review of Systems   Constitutional:  Negative for chills and fever.   HENT:  Negative for congestion and drooling.    Respiratory:  Negative for cough and shortness of breath.    Cardiovascular:  Negative for chest pain and leg swelling.   Gastrointestinal:  Negative for constipation, diarrhea, nausea and vomiting.   Genitourinary:  Negative for difficulty urinating and dysuria.   Musculoskeletal:  Negative for arthralgias and gait problem.     Objective:     Vital Signs (Most Recent):  Temp: 97.4 °F (36.3 °C) (03/10/25 0815)  Pulse: 70 (03/10/25 0815)  Resp: 16 (03/10/25 0815)  BP: (!) 100/55 (03/10/25 0815)  SpO2: 100 % (03/09/25 1533) Vital Signs (24h Range):  Temp:  [97.4 °F (36.3 °C)-98.6 °F (37 °C)] 97.4 °F (36.3 °C)  Pulse:  [56-70] 70  Resp:  [14-18] 16  SpO2:  [98 %-100 %] 100 %  BP: ()/(45-74)  "100/55     Weight: 90.1 kg (198 lb 10.2 oz)  Body mass index is 31.11 kg/m².     Physical Exam  Constitutional:       Appearance: Normal appearance.   HENT:      Head: Normocephalic and atraumatic.   Cardiovascular:      Rate and Rhythm: Normal rate and regular rhythm.   Pulmonary:      Effort: Pulmonary effort is normal. No respiratory distress.      Breath sounds: Normal breath sounds.   Abdominal:      General: Abdomen is flat. There is no distension.      Palpations: Abdomen is soft.   Musculoskeletal:      Right lower leg: No edema.      Left lower leg: No edema.   Skin:     General: Skin is warm and dry.   Neurological:      Mental Status: She is alert and oriented to person, place, and time. Mental status is at baseline.      Comments: CN II: Visual Fields full to confrontation  CN III, IV , VI PERRL   CN III: no palsy   Nystagmus: none  Diplopia: none  Ophthalmoparesis: none  CN V Facial sensation intact  CN VII facial expression full, symmetric  CN VIII normal  CN IX normal  CN X normal  CN XI normal  CN XII normal                 Significant Labs: A1C:   Recent Labs   Lab 03/10/25  1610   HGBA1C 5.4     ABGs: No results for input(s): "PH", "PCO2", "HCO3", "POCSATURATED", "BE", "TOTALHB", "COHB", "METHB", "O2HB", "POCFIO2", "PO2" in the last 48 hours.  Bilirubin:   Recent Labs   Lab 03/09/25  1624 03/10/25  1610   BILITOT 0.4 0.3     Blood Culture: No results for input(s): "LABBLOO" in the last 48 hours.  BMP:   Recent Labs   Lab 03/10/25  1610   GLU 90      K 3.4*   *   CO2 23   BUN 10   CREATININE 0.7   CALCIUM 8.3*   MG 1.9     CBC:   Recent Labs   Lab 03/10/25  1610   WBC 3.25*   HGB 10.1*   HCT 31.4*        CMP:   Recent Labs   Lab 03/10/25  1610      K 3.4*   *   CO2 23   GLU 90   BUN 10   CREATININE 0.7   CALCIUM 8.3*   PROT 6.9   ALBUMIN 3.5   BILITOT 0.3   ALKPHOS 38*   AST 13   ALT 14   ANIONGAP 7*     Cardiac Markers: No results for input(s): "CKMB", "MYOGLOBIN", " ""BNP", "TROPISTAT" in the last 48 hours.  Coagulation: No results for input(s): "PT", "INR", "APTT" in the last 48 hours.  Lactic Acid: No results for input(s): "LACTATE" in the last 48 hours.  Lipase: No results for input(s): "LIPASE" in the last 48 hours.  Lipid Panel:   Recent Labs   Lab 03/10/25  1610   CHOL 170  170   HDL 44  44   LDLCALC 105.8  105.8   TRIG 101  101   CHOLHDL 25.9  25.9     Magnesium:   Recent Labs   Lab 03/10/25  1610   MG 1.9     POCT Glucose: No results for input(s): "POCTGLUCOSE" in the last 48 hours.  Prealbumin: No results for input(s): "PREALBUMIN" in the last 48 hours.  Respiratory Culture: No results for input(s): "GSRESP", "RESPIRATORYC" in the last 48 hours.  Troponin: No results for input(s): "TROPONINI", "TROPONINIHS" in the last 48 hours.  TSH: No results for input(s): "TSH" in the last 4320 hours.  Urine Culture: No results for input(s): "LABURIN" in the last 48 hours.  Urine Studies: No results for input(s): "COLORU", "APPEARANCEUA", "PHUR", "SPECGRAV", "PROTEINUA", "GLUCUA", "KETONESU", "BILIRUBINUA", "OCCULTUA", "NITRITE", "UROBILINOGEN", "LEUKOCYTESUR", "RBCUA", "WBCUA", "BACTERIA", "SQUAMEPITHEL", "HYALINECASTS" in the last 48 hours.    Invalid input(s): "WRIGHTSUR"    Significant Imaging: I have reviewed all pertinent imaging results/findings within the past 24 hours.  "

## 2025-03-11 LAB
CHOLEST SERPL-MCNC: 170 MG/DL (ref 120–199)
CHOLEST SERPL-MCNC: 170 MG/DL (ref 120–199)
CHOLEST/HDLC SERPL: 3.9 {RATIO} (ref 2–5)
CHOLEST/HDLC SERPL: 3.9 {RATIO} (ref 2–5)
ESTIMATED AVG GLUCOSE: 108 MG/DL (ref 68–131)
HBA1C MFR BLD: 5.4 % (ref 4–5.6)
HDLC SERPL-MCNC: 44 MG/DL (ref 40–75)
HDLC SERPL-MCNC: 44 MG/DL (ref 40–75)
HDLC SERPL: 25.9 % (ref 20–50)
HDLC SERPL: 25.9 % (ref 20–50)
LDLC SERPL CALC-MCNC: 105.8 MG/DL (ref 63–159)
LDLC SERPL CALC-MCNC: 105.8 MG/DL (ref 63–159)
NONHDLC SERPL-MCNC: 126 MG/DL
NONHDLC SERPL-MCNC: 126 MG/DL
TRIGL SERPL-MCNC: 101 MG/DL (ref 30–150)
TRIGL SERPL-MCNC: 101 MG/DL (ref 30–150)

## 2025-03-11 PROCEDURE — 25000003 PHARM REV CODE 250: Performed by: PHYSICIAN ASSISTANT

## 2025-03-11 PROCEDURE — 11400000 HC PSYCH PRIVATE ROOM

## 2025-03-11 PROCEDURE — 25000003 PHARM REV CODE 250: Performed by: PSYCHIATRY & NEUROLOGY

## 2025-03-11 PROCEDURE — 36415 COLL VENOUS BLD VENIPUNCTURE: CPT | Performed by: PSYCHIATRY & NEUROLOGY

## 2025-03-11 RX ORDER — LANOLIN ALCOHOL/MO/W.PET/CERES
1 CREAM (GRAM) TOPICAL DAILY
Status: DISCONTINUED | OUTPATIENT
Start: 2025-03-11 | End: 2025-03-12 | Stop reason: HOSPADM

## 2025-03-11 RX ORDER — ESCITALOPRAM OXALATE 10 MG/1
10 TABLET ORAL DAILY
Status: DISCONTINUED | OUTPATIENT
Start: 2025-03-12 | End: 2025-03-12 | Stop reason: HOSPADM

## 2025-03-11 RX ADMIN — FERROUS SULFATE TAB 325 MG (65 MG ELEMENTAL FE) 1 EACH: 325 (65 FE) TAB at 08:03

## 2025-03-11 RX ADMIN — Medication 6 MG: at 08:03

## 2025-03-11 RX ADMIN — ESCITALOPRAM OXALATE 5 MG: 5 TABLET, FILM COATED ORAL at 08:03

## 2025-03-11 RX ADMIN — HYDROXYZINE HYDROCHLORIDE 50 MG: 25 TABLET, FILM COATED ORAL at 08:03

## 2025-03-11 NOTE — PSYCH
LPC faxed aftercare referral packet to [facility] for follow-up appointment. LPC will check status in 30 minutes.

## 2025-03-11 NOTE — PSYCH
"Meditation/Education:  Cool Side : Meditation Exercise  Patient Response:  "Patient attended session and found the session helpful".              "

## 2025-03-11 NOTE — PLAN OF CARE
Problem: Adult Behavioral Health Plan of Care  Goal: Rounds/Family Conference  Outcome: Progressing  Flowsheets (Taken 3/11/2025 0852)  Participants:   psychiatrist   nursing   other ([facility] and med students)   TREATMENT TEAM      Chief Complaint:    Pt is a 39 year old  female with chief complaints of anxiety/mood symptoms     Patient reports experiencing anxiety, which she believes may be related to her menstrual cycle. She states that she is not currently dealing with any issues and denies any psychosocial stressors. She reluctantly admitted to taking her children's Adderall but does not believe the medication contributed to her anxiety.    Pt Goal(s):    Pt states to return to work.    Current Progress:   Pt attended treatment team dressed in paper scrubs    Pt affect less anxious/ less dysphoric mood    Pt denies side effects to medications.    Patient reports being at work when she suddenly experienced a trigger for her anxiety, though she is unsure of what specifically caused it.     Pt admitted to taking 10 mg of Adderall, which was not prescribed to her, and she obtained it from her child.     Patient lacks insight into her condition. She states that her anxiety is significantly improved today.     Patient reports experiencing memory loss and does not recall speaking with the psychiatrist yesterday.     Pt  mentions that she was doing well previously but is uncertain about what led to her current state.    Pt denies all symptoms this AM.  Pt symptoms appear to be substance induced and are resolving with detox.    Program/groups:  Encourage pt to continue to attend all groups. Pt isolating in room.     Revisions to Plan:  Encourage pt to attend all groups. Recommendations are for pt to attend medication management and psychotherapy.   MEDICATIONS:  lexapro at 5 mg po q day- will optimize as indicated- increase to 10 mg po q day.  nicotine 14 mg patch dermal q day

## 2025-03-11 NOTE — PSYCH
"Ofelia Ogden MRN:[id number] ([facility]#[id number]) (39 y.o. F) (Adm: 25)  [facility] [id number]  Patient Demographics    Patient Name  Ofelia Ogden Legal Sex  Female          Age  1985 (39 y.o.) N  [id number] Address  [address] [place] [address] Phone  [phone] (Home)  [phone] (Mobile) *Preferred*     Patient Demographics    Address  [address] [place] [address] Phone  [phone] (Home)  [phone] (Mobile) *Preferred*     "Primary Care Provider"  Gilda Jackson MD "Phone"  [phone]     Emergency Contacts    None on File     Other Contacts    Name Relation Home Work Mobile   Natalie Batista Mother [phone]       Documents on File     Status Date Received Description   Documents for the Patient   Notice of Privacy Pract Ackn Signed 10/13/13    [facility]   Patient ID Received 10/13/13 [facility]   Clinic Authorization      Provider Based Acknowledgement      Notice of Privacy Pract Ackn Signed 11/09/15 HIPAA/Self   Plain Language Summary English No, Patient Declined Print () 17    Advance Directive Acknowledgement      Plain Language Summary English Acknowledged () 20 ARVIND   Notice of Privacy Pract Ackn Signed 20    Plain Language Summary English Acknowledged () 20 fin asst info   Plain Language Summary English Acknowledged () 20    Plain Language Summary English Acknowledged () 10/09/21    Plain Language Summary English Acknowledged () 22    Plain Language Summary English Acknowledged () 22    [facility] Contracted Facility Disclosure      Plain Language Summary English Acknowledged () 22    Plain Language Summary English Acknowledged () 23    Plain Language Summary English Acknowledged () 23 Fin Asst Info   Plain Language Summary English Acknowledged () 23    Plain Language Summary English " Acknowledged () 23    Plain Language Summary English Acknowledged () 24    Plain Language Summary English Acknowledged () 24 Fin Asst info / Self   Plain Language Summary English Acknowledged () 24 fin assist info   Plain Language Summary English Acknowledged () 12/10/24 Fin Assist Info   Notice of Nondiscrimination and Accessibility Received 12/10/24 NNA/Self   Plain Language Summary English Acknowledged () 25    Plain Language Summary English Acknowledged () 25    Patient Rights and Responsibilities Acknowledged 25    [facility] Contracted Facility Disclosure      [facility] Contracted Facility Disclosure Unable to Obtain (Deleted) 25    Documents for the Encounter   Medicare Lifetime Reserve Form      Important Medicare Message Printed at Discharge      Advance Beneficiary Notice      Hospital Authorization Unable to Obtain 25    Flowsheets Nursing Received 03/10/25      Admission Information    Current Information    Attending Provider Admitting Provider Admission Type Admission Status   Asya Macdonald MD Levy, Danielle, MD Urgent Confirmed Admission          Admission Date/Time Discharge Date Hospital Service Auth/Cert Status   25  2120  Psychiatry Incomplete          Hospital Area Unit Room/Bed    [facility] UN[facility] [address]              Admission    Complaint        Hospital Account    Name Acct ID Class Status Primary Coverage   Melvi, Ofelia Ceballos [id number] IP- Psych Open MEDICAID - [employer]          Guarantor Account (for Hospital Account #[id number])    Name Relation to Pt Service Area Active? Acct Type   Melvi, Ofelia Ceballos Self [facility] Yes Behavioral Health   Address Phone     [facility] [place] [address] [phone](H)            Coverage Information (for Hospital Account #[id number])    F/O Payor/Plan Precert #   MEDICAID/[employer] Pending  Ref# [id number]   Subscriber Subscriber #   Melvi, Ofelia Ceballos [id number]     Address Phone   [address] [address]  [place] [address]
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
## 2025-03-11 NOTE — PLAN OF CARE
"Patient guarded and withdrawn. Poor eye contact at times. Patient isolating in room, reporting "I just need to sleep. I do feel better today. I was not getting enough sleep". Encouraged this patient to attend groups and activities. Patient has blunted affect with depressed mood. Patient reports on a scale of 1-10 with ten being the highest level of depression. Patient rating depression today a 8/10. On the same scale patient rating anxiety a 0/10. Encouraged patient to attend groups and activities. Patient denies SI/HI no self harming behavior displayed, no aggressive behavior displayed towards others. Patient contracted safety with staff and unit. Discussed healthy coping skills and techniques. Educated, reviewed  and discussed plan of care and medication regiment with this patient. Discussed and educated with this patient any concerns that needed to be addressed along with the importance of medication compliance  1:1 with this patient. Patient voiced understanding of all teachings.   "

## 2025-03-11 NOTE — PLAN OF CARE
Problem: Anxiety Signs/Symptoms  Goal: Improved Mood Symptoms (Anxiety Signs/Symptoms)  Outcome: Progressing  Intervention: Optimize Emotion and Mood  Flowsheets (Taken 3/11/2025 1136)  Supportive Measures:   active listening utilized   counseling provided   guided imagery facilitated   verbalization of feelings encouraged   PROCESS GROUP:    Pt did not attend group today. PLPC met with pt individually.  PLPC attempted to  discuss with pt on group discussion. Pt was resting in bed quietly, but did not respond to PLPC when approached. Pt just moved legs.  PLPC discussed with pt PLPC will come back at a later time and date to discuss group.

## 2025-03-11 NOTE — NURSING
Pt is sleeping at this time and has slept 9 hrs. Resp even & unlabored. Pathways clear. Q 15 minute safety checks ongoing. All precautions maintained.

## 2025-03-11 NOTE — PSYCH
Latest Reference Range & Units 03/10/25 16:10   WBC 3.90 - 12.70 K/uL 3.25 (L)   RBC 4.00 - 5.40 M/uL 3.61 (L)   Hemoglobin 12.0 - 16.0 g/dL 10.1 (L)   Hematocrit 37.0 - 48.5 % 31.4 (L)   MCV 82 - 98 fL 87   MCH 27.0 - 31.0 pg 28.0   MCHC 32.0 - 36.0 g/dL 32.2   RDW 11.5 - 14.5 % 16.8 (H)   Platelet Count 150 - 450 K/uL 302   MPV 9.2 - 12.9 fL 10.3   Gran % 38.0 - 73.0 % 34.8 (L)   Lymph % 18.0 - 48.0 % 49.8 (H)   Mono % 4.0 - 15.0 % 12.0   Eos % 0.0 - 8.0 % 2.8   Basophil % 0.0 - 1.9 % 0.6   Immature Granulocytes 0.0 - 0.5 % 0.0   Gran # (ANC) 1.8 - 7.7 K/uL 1.1 (L)   Lymph # 1.0 - 4.8 K/uL 1.6   Mono # 0.3 - 1.0 K/uL 0.4   Eos # 0.0 - 0.5 K/uL 0.1   Baso # 0.00 - 0.20 K/uL 0.02   Immature Grans (Abs) 0.00 - 0.04 K/uL 0.00   nRBC 0 /100 WBC 0   Differential Method  Automated   Iron 30 - 160 ug/dL 25 (L)   TIBC 250 - 450 ug/dL 411   Saturated Iron 20 - 50 % 6 (L)   Transferrin 200 - 375 mg/dL 278   Ferritin 20.0 - 300.0 ng/mL 7 (L)   Folate 4.0 - 24.0 ng/mL 8.8   (L): Data is abnormally low  (H): Data is abnormally high     Latest Reference Range & Units 03/09/25 15:55   Benzodiazepines Negatiive  Presumptive Positive !   Methadone metabolites Negative  Negative   Phencyclidine Negative  Negative   Toxicology Information  SEE COMMENT   Cocaine, Urine Negative  Negative   Opiates, Urine Negative  Negative   Barbituates, Urine Negative  Negative   Amphetamines, Urine Negative  Presumptive Positive !   Marijuana (THC) Metabolite Negative  Presumptive Positive !   !: Data is abnormal     Latest Reference Range & Units 03/09/25 16:24   Alcohol, Serum <10 mg/dL <10   Acetaminophen Level 10.0 - 20.0 ug/mL <3.0 (L)   (L): Data is abnormally low     Latest Reference Range & Units 03/09/25 15:55   Specimen UA  Urine, Clean Catch   Color, UA Yellow, Straw, Naty  Colorless !   Appearance, UA Clear  Hazy !   Spec Grav UA 1.005 - 1.030  1.010   pH, UA 5.0 - 8.0  7.0   Protein, UA Negative  Negative   Glucose, UA Negative   Negative   Ketones, UA Negative  Negative   Blood, UA Negative  3+ !   NITRITE UA Negative  Negative   UROBILINOGEN UA <2.0 EU/dL Negative   Bilirubin (UA) Negative  Negative   Leukocyte Esterase, UA Negative  Negative   RBC, UA 0 - 4 /hpf 3   WBC, UA 0 - 5 /hpf 3   Bacteria, UA None-Occ /hpf Moderate !   Squam Epithel, UA /hpf 3   Unclassified Crystals None-Moderate  Rare   Microscopic Comment  SEE COMMENT   Urine Creatinine 15.0 - 325.0 mg/dL 37.2   !: Data is abnormal

## 2025-03-11 NOTE — PLAN OF CARE
Plan of care reviewed. Denies intent to harm self or others at this time. Patient denies hallucinations. Accepts snacks and medications. Gait steady, no falls. Limited interaction noted with staff and peers. Patient has been speaking on the phone frequently. Will continue precautions and monitor for safety.

## 2025-03-11 NOTE — PROGRESS NOTES
"PSYCHIATRY DAILY INPATIENT PROGRESS NOTE  SUBSEQUENT HOSPITAL VISIT    ENCOUNTER DATE: 3/11/2025  SITE: [facility]    DATE OF ADMISSION: 3/9/2025  9:20 PM  LENGTH OF STAY: 2 days      CHIEF COMPLAINT   Ofelia Ogden is a 39 y.o. female, seen during daily linda rounds on the inpatient unit.  Ofelia Ogden presented with the chief complaint of  anxiety/mood symptoms, "Anxiety attacks."       The patient was seen and examined. The chart was reviewed.     Reviewed notes from Rns and RD and labs from the last 24 hours.    The patient's case was discussed with the treatment team/care providers today including Rns and LPC    Staff reports no behavioral or management issues.     The patient has been compliant with treatment.      Subjective 03/11/2025       Today, the patient reports that she is doing better today. She reports that she is here for anxiety.   -She again denied any psychosocial stressors. She hesitantly admitted to taking her kids adderall. She does not feel that the adderall triggered her anxiety.  -She denied withdrawals or cravings; she does not feel that she has any addiction issues.     She denied all symptoms this AM; it appears that symptoms were substance induced and are resolving with detox.         The patient denies any side effects to medications.    At this time symptoms remains severe, functionally and behaviorally impairing and pt remains in need of continued acute inpatient hospitalization for both safety and stabilization.           Psychiatric ROS (observed, reported, or endorsed/denied):  Depressed mood - denies  Interest/pleasure/anhedonia: denies  Guilt/hopelessness/worthlessness - denies  Changes in Sleep - denies  Changes in Appetite - denies  Changes in Concentration - denies  Changes in Energy - denies  PMA/R- denies  Suicidal- active/passive ideations - denies  Homicidal ideations: active/passive ideations - denies    Hallucinations - denies  Delusions - " denies  Disorganized behavior - denies  Disorganized speech - denies  Negative symptoms - denies    Elevated mood - denies  Decreased need for sleep - denies  Grandiosity - denies  Racing thoughts - denies  Impulsivity - denies  Irritability- denies  Increased energy - denies  Distractibility - denies  Increase in goal-directed activity or PMA- denies    Symptoms of ZOILA - denies  Symptoms of Panic Disorder- denies  Symptoms of PTSD - denies        Overall progress: Patient is showing moderate improvement        Psychotherapy:  Target symptoms: anxiety , mood disorder  Why chosen therapy is appropriate versus another modality: relevant to diagnosis, patient responds to this modality, evidence based practice  Outcome monitoring methods: self-report, observation  Therapeutic intervention type: insight oriented psychotherapy, behavior modifying psychotherapy, supportive psychotherapy, interactive psychotherapy  Topics discussed/themes: building skills sets for symptom management, symptom recognition, substance abuse  The patient's response to the intervention is reluctant. The patient's progress toward treatment goals is limited.   Duration of intervention: 16 minutes.        Medical ROS  General ROS: negative  Ophthalmic ROS: negative  ENT ROS: negative  Allergy and Immunology ROS: negative  Hematological and Lymphatic ROS: negative  Endocrine ROS: negative  Respiratory ROS: no cough, shortness of breath, or wheezing  Cardiovascular ROS: no chest pain or dyspnea on exertion  Gastrointestinal ROS: no abdominal pain, change in bowel habits, or black or bloody stools  Genito-Urinary ROS: no dysuria, trouble voiding, or hematuria  Musculoskeletal ROS: negative  Neurological ROS: no TIA or stroke symptoms  Dermatological ROS: negative         PAST MEDICAL HISTORY   Past Medical History:   Diagnosis Date    Depression            PSYCHOTROPIC MEDICATIONS   Scheduled Meds:   EScitalopram oxalate  5 mg Oral Daily    nicotine  1  "patch Transdermal Daily     Continuous Infusions:  PRN Meds:.  Current Facility-Administered Medications:     hydrOXYzine HCL, 50 mg, Oral, Q6H PRN    melatonin, 6 mg, Oral, Nightly PRN    OLANZapine, 10 mg, Oral, Q8H PRN **AND** OLANZapine, 10 mg, Intramuscular, Q8H PRN        EXAMINATION    VITALS   Vitals:    03/09/25 2145 03/10/25 0815 03/10/25 1947 03/11/25 0721   BP: 124/74 (!) 100/55 (!) 102/54 (!) 101/51   BP Location: Right arm Right arm Left arm Left arm   Patient Position: Sitting Sitting Lying Lying   Pulse: (!) 56 70 (!) 52 (!) 52   Resp: 18 16 16 18   Temp: 97.8 °F (36.6 °C) 97.4 °F (36.3 °C) 98.8 °F (37.1 °C) 98.4 °F (36.9 °C)   TempSrc: Temporal Tympanic Temporal Tympanic   SpO2: 100%  98% 99%   Weight: 90.1 kg (198 lb 10.2 oz)      Height: 5' 7" (1.702 m)          Body mass index is 31.11 kg/m².        CONSTITUTIONAL  General Appearance: unremarkable, age appropriate     MUSCULOSKELETAL  Muscle Strength and Tone:no dyskinesia, no dystonia, no tremor, no tic  Abnormal Involuntary Movements: No  Gait and Station: non-ataxic     PSYCHIATRIC   Level of Consciousness: lethargic  Orientation: person, place, time, and situation  Grooming: Disheveled and Hospital garb  Psychomotor Behavior: reluctant to participate, psychomotor retardation  Speech: slowed, soft, non-spontaneous, monotone  Language: grossly intact, able to name, able to repeat  Mood: less anxious and less dysphoric, "better"  Affect: less Anxious, Consistent with mood, Congruent with thought, and improved range  Thought Process: linear, logical, goal directed  Associations: intact   Thought Content: denies SI, denies HI, and no delusions  Perceptions: denies AH and denies  VH  Memory: Able to recall past events, Remote intact, and Recent intact  Attention:Decreased and Impaired to some degree  Fund of Knowledge: Aware of current events and Vocabulary appropriate   Estimate if Intelligence:  Average based on work/education history, vocabulary " and mental status exam  Insight: limited awareness of illness- improving   Judgment: impaired due to substance use- improving      DIAGNOSTIC TESTING   Laboratory Results  Recent Results (from the past 24 hours)   CBC auto differential    Collection Time: 03/10/25  4:10 PM   Result Value Ref Range    WBC 3.25 (L) 3.90 - 12.70 K/uL    RBC 3.61 (L) 4.00 - 5.40 M/uL    Hemoglobin 10.1 (L) 12.0 - 16.0 g/dL    Hematocrit 31.4 (L) 37.0 - 48.5 %    MCV 87 82 - 98 fL    MCH 28.0 27.0 - 31.0 pg    MCHC 32.2 32.0 - 36.0 g/dL    RDW 16.8 (H) 11.5 - 14.5 %    Platelets 302 150 - 450 K/uL    MPV 10.3 9.2 - 12.9 fL    Immature Granulocytes 0.0 0.0 - 0.5 %    Gran # (ANC) 1.1 (L) 1.8 - 7.7 K/uL    Immature Grans (Abs) 0.00 0.00 - 0.04 K/uL    Lymph # 1.6 1.0 - 4.8 K/uL    Mono # 0.4 0.3 - 1.0 K/uL    Eos # 0.1 0.0 - 0.5 K/uL    Baso # 0.02 0.00 - 0.20 K/uL    nRBC 0 0 /100 WBC    Gran % 34.8 (L) 38.0 - 73.0 %    Lymph % 49.8 (H) 18.0 - 48.0 %    Mono % 12.0 4.0 - 15.0 %    Eosinophil % 2.8 0.0 - 8.0 %    Basophil % 0.6 0.0 - 1.9 %    Differential Method Automated    Comprehensive metabolic panel    Collection Time: 03/10/25  4:10 PM   Result Value Ref Range    Sodium 141 136 - 145 mmol/L    Potassium 3.4 (L) 3.5 - 5.1 mmol/L    Chloride 111 (H) 95 - 110 mmol/L    CO2 23 23 - 29 mmol/L    Glucose 90 70 - 110 mg/dL    BUN 10 6 - 20 mg/dL    Creatinine 0.7 0.5 - 1.4 mg/dL    Calcium 8.3 (L) 8.7 - 10.5 mg/dL    Total Protein 6.9 6.0 - 8.4 g/dL    Albumin 3.5 3.5 - 5.2 g/dL    Total Bilirubin 0.3 0.1 - 1.0 mg/dL    Alkaline Phosphatase 38 (L) 40 - 150 U/L    AST 13 10 - 40 U/L    ALT 14 10 - 44 U/L    eGFR >60 >60 mL/min/1.73 m^2    Anion Gap 7 (L) 8 - 16 mmol/L   Magnesium    Collection Time: 03/10/25  4:10 PM   Result Value Ref Range    Magnesium 1.9 1.6 - 2.6 mg/dL   Iron and TIBC    Collection Time: 03/10/25  4:10 PM   Result Value Ref Range    Iron 25 (L) 30 - 160 ug/dL    Transferrin 278 200 - 375 mg/dL    TIBC 411 250 - 450  ug/dL    Saturated Iron 6 (L) 20 - 50 %   Folate    Collection Time: 03/10/25  4:10 PM   Result Value Ref Range    Folate 8.8 4.0 - 24.0 ng/mL   Ferritin    Collection Time: 03/10/25  4:10 PM   Result Value Ref Range    Ferritin 7 (L) 20.0 - 300.0 ng/mL   Lipid panel    Collection Time: 03/10/25  4:10 PM   Result Value Ref Range    Cholesterol 170 120 - 199 mg/dL    Triglycerides 101 30 - 150 mg/dL    HDL 44 40 - 75 mg/dL    LDL Cholesterol 105.8 63.0 - 159.0 mg/dL    HDL/Cholesterol Ratio 25.9 20.0 - 50.0 %    Total Cholesterol/HDL Ratio 3.9 2.0 - 5.0    Non-HDL Cholesterol 126 mg/dL   Lipid panel    Collection Time: 03/10/25  4:10 PM   Result Value Ref Range    Cholesterol 170 120 - 199 mg/dL    Triglycerides 101 30 - 150 mg/dL    HDL 44 40 - 75 mg/dL    LDL Cholesterol 105.8 63.0 - 159.0 mg/dL    HDL/Cholesterol Ratio 25.9 20.0 - 50.0 %    Total Cholesterol/HDL Ratio 3.9 2.0 - 5.0    Non-HDL Cholesterol 126 mg/dL             MEDICAL DECISION MAKING      ASSESSMENT:   MDD, recurrent, severe without psychotic features  Unspecified Anxiety Disorder      Psychosocial stressors     Polysubstance dependence  Nicotine Dependence         PROBLEM LIST AND MANAGEMENT PLANS     Depression: pt counseled  -resume home lexapro at 5 mg po q day- will optimize as indicated- increase to 10 mg po q day   R/o SIMD     Anxiety: pt counseled  -lexapro as above  -start vistaril 50 mg po q 6 hours prn  -r/o SIAD     Psychosocial stressors: pt counseled  -SW consulted to assist with resources      Polysubstance dependence: pt counseled  -rehab if willing once stable- pt declined      Nicotine Dependence: pt counseled  -start nicotine 14 mg patch dermal q day          Discussed diagnosis, risks and benefits of proposed treatment vs alternative treatments vs no treatment, potential side effects of these treatments and the inherent unpredictability of treatment. The patient expresses understanding of the above and displays the capacity to  agree with this treatment given said understanding. Patient also agrees that, currently, the benefits outweigh the risks and would like to pursue/continue treatment at this time.    Any medications being used off-label were discussed with the patient inclusive of the evidence base for the use of the medications and consent was obtained for the off-label use of the medication.       DISCHARGE PLANNING  Expected Disposition Plan: Home or Self Care- discharge tomorrow if stable      NEED FOR CONTINUED HOSPITALIZATION  Psychiatric illness continues to pose a potential threat to life or bodily function, of self or others, thereby requiring the need for continued inpatient psychiatric hospitalization: Yes, due to: gravely disabled, as evidenced by:  Ongoing concerns with grave disability with patient unable to perform basic feeding, hygiene and dressing activities without significant constant support.    Protective inpatient pyschiatric hospitalization required while a safe disposition plan is enacted: Yes    Patient stabilized and ready for discharge from inpatient psychiatric unit: No        STAFF:   Norman Grover MD  Psychiatry
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
## 2025-03-12 VITALS
DIASTOLIC BLOOD PRESSURE: 55 MMHG | RESPIRATION RATE: 16 BRPM | SYSTOLIC BLOOD PRESSURE: 104 MMHG | WEIGHT: 199.75 LBS | HEART RATE: 53 BPM | BODY MASS INDEX: 31.35 KG/M2 | HEIGHT: 67 IN | OXYGEN SATURATION: 98 % | TEMPERATURE: 99 F

## 2025-03-12 PROBLEM — F15.94 AMPHETAMINE-INDUCED MOOD DISORDER: Status: ACTIVE | Noted: 2025-03-12

## 2025-03-12 PROCEDURE — 25000003 PHARM REV CODE 250: Performed by: PSYCHIATRY & NEUROLOGY

## 2025-03-12 PROCEDURE — 25000003 PHARM REV CODE 250: Performed by: PHYSICIAN ASSISTANT

## 2025-03-12 RX ORDER — ESCITALOPRAM OXALATE 10 MG/1
10 TABLET ORAL DAILY
Qty: 30 TABLET | Refills: 1 | Status: SHIPPED | OUTPATIENT
Start: 2025-03-12 | End: 2026-03-12

## 2025-03-12 RX ORDER — FERROUS SULFATE 325(65) MG
325 TABLET, DELAYED RELEASE (ENTERIC COATED) ORAL DAILY
Qty: 90 TABLET | Refills: 1 | Status: SHIPPED | OUTPATIENT
Start: 2025-03-12

## 2025-03-12 RX ORDER — IBUPROFEN 200 MG
1 TABLET ORAL DAILY PRN
Status: DISCONTINUED | OUTPATIENT
Start: 2025-03-12 | End: 2025-03-12 | Stop reason: HOSPADM

## 2025-03-12 RX ADMIN — FERROUS SULFATE TAB 325 MG (65 MG ELEMENTAL FE) 1 EACH: 325 (65 FE) TAB at 08:03

## 2025-03-12 RX ADMIN — ESCITALOPRAM OXALATE 10 MG: 10 TABLET ORAL at 08:03

## 2025-03-12 RX ADMIN — HYDROXYZINE HYDROCHLORIDE 50 MG: 25 TABLET, FILM COATED ORAL at 09:03

## 2025-03-12 NOTE — PLAN OF CARE
All belongings accounted for and will be physically given to patient upon discharge.  Patient denies intentions to harm self and/or others at this time.  No acute distress noted. Will discharge to home via family.  Patient educated on discharge plan, aftercare appointments, and medications.

## 2025-03-12 NOTE — PROGRESS NOTES
"   03/12/25 1034   [facility] Group Therapy   Group Name Therapeutic Recreation   Specific Interventions Coping Skills Training   Participation Level Appropriate;Sharing   Participation Quality Cooperative;Social   Insight/Motivation Improved   Affect/Mood Display Appropriate   Cognition Alert   Psychomotor WNL     Patient presents calm, cooperative, reports an "alright" mood. Patient reports a decrease in anxiety. Patient verbalized that she doesn't have leisure time because she is busy with her 17 year old son and his school activities. Patient shared she sometimes enjoy playing games on the phone, getting her nails done and listening to music.  "

## 2025-03-12 NOTE — DISCHARGE SUMMARY
"Discharge Summary  Psychiatry    Admit Date: 3/9/2025    Discharge Date and Time:  03/12/2025 8:19 AM    Attending Physician: Asya Macodnald MD     Discharge Provider: Norman Grover MD    Reason for Admission:  anxiety/mood symptoms, "Anxiety attacks."     History of Present Illness:   The patient presented to the ER on 3/9/2025 . Per staff notes:  -Pt brought in by crisis unit after having multiple anxiety attacks while at work today, pt states that she has similar episodes yearly around her late son's birthday. Pt denies SI/HI. Pt has hx of depression and anxiety, compliant with prescribed medication   -39-year-old female with a past medical history of anxiety and depression presenting to the emergency department today for evaluation of anxiety attacks today.  Reports she frequently has anxiety attacks related around her late sons passing.  Today at work patient was having an attack in the crisis unit was called and brought her in for evaluation.  Patient was states I feel like I need to jump out of my skin.  Reports she takes Lexapro and has been compliant with her medication.  Patient was also notes she recently stopped talking to her therapist.  Denies any SI, HI, auditory/visual/tactile hallucinations.  Patient was states she feels like her heart is racing otherwise denies any fever, chest pain, shortness for breath, nausea, vomiting, diarrhea, abdominal pain, headache, dizziness or other associated symptoms.   -Pt was cooperative. Denies SI/HI currently and denies ever thinking about suicide. Denies suicide attempts. Patient rates anxiety "20/10" and depression "20/10". She mentions multiple anxiety attacks at work and stating " I probably had a mental breakdown". She voices "I don't like people" and "Im a fighter". Pt says she has 1000 voices in her head sometimes all talking at once. She says this started 4 years ago when she lost her son to bone cancer. Pt smokes marijuana to help with her " "anxiety and that yesterday she would get high and go to sleep multiple times during the day. This morning she woke up late for work looking for her medication and found some old adderall that was prescribed to her in  and took it because she was drowsy. She mentions a recent promotion at her job, that she loves herself and she is a good mother. Pt mentions she is expecting her first grandchild this .      The patient was medically cleared and admitted to the [facility].     The patient reports that she started having depression in  after after son  from cancer. She notes that she has had recurrent episodes of depression since then. She notes a similar course with her anxiety.   She reports that yesterday was "a bad day", but she could not identify any triggers or precipitants.               UDS was +THC/amphetamine/benzos     She was very sleepy today. She endorsed severe depressive symptoms over the last 24 hours, but gave conflicting symptoms today.   -she minimally participated with the interview.        Symptoms of Depression: diminished mood - Yes, loss of interest/anhedonia - Yes;  recurrent - Yes, >14 days - Yes, diminished energy - Yes, change in sleep - Yes, change in appetite - Yes, diminished concentration or cognition or indecisiveness - Yes, PMA/R -  Yes, excessive guilt or hopelessness or worthlessness - Yes, suicidal ideations - No     Changes in Sleep: trouble with initiation- No, maintenance, - No early morning awakening with inability to return to sleep - No, hypersomnolence - Yes     Suicidal- active/passive ideations - No, organized plans, future intentions - No     Homicidal ideations: active/passive ideations - No, organized plans, future intentions - No     Symptoms of psychosis: hallucinations - No, delusions - No, disorganized speech - No, disorganized behavior or abnormal motor behavior - No, or negative symptoms (diminshed emotional expression, avolition, anhedonia, alogia, " "asociality) - No, active phase symptoms >1 month - No, continuous signs of illness > 6 months - No, since onset of illness decreased level of functioning present - No     Symptoms of estelita or hypomania: elevated, expansive, or irritable mood with increased energy or activity - No; > 4 days - No,  >7 days - No; with inflated self-esteem or grandiosity - No, decreased need for sleep - No, increased rate of speech - No, FOI or racing thoughts - No, distractibility - No, increased goal directed activity or PMA - No, risky/disinhibited behavior - No     Symptoms of ZOILA: excessive anxiety/worry/fear, more days than not, about numerous issues - Yes, ongoing for >6 months - No, difficult to control - Yes, with restlessness - No, fatigue - Yes, poor concentration - Yes, irritability - Yes, muscle tension - Yes, sleep disturbance - Yes; causes functionally impairing distress - Yes     Symptoms of Panic Disorder: recurrent panic attacks (palpitations/heart racing, sweating, shakiness, dyspnea, choking, chest pain/discomfort, Gi symptoms, dizzy/lightheadedness, hot/col flashes, paresthesias, derealization, fear of losing control or fear of dying or fear of "going crazy") - Yes, precipitated - Yes, un-precipitated - No, source of worry and/or behavioral changes secondary for 1 month or longer- No, agoraphobia - No     Symptoms of PTSD: h/o trauma exposure - Yes; re-experiencing/intrusive symptoms - No, avoidant behavior - No, 2 or more negative alterations in cognition or mood - No, 2 or more hyperarousal symptoms - No; with dissociative symptoms - No, ongoing for 1 or more  months - No     Symptoms of OCD: obsessions (recurrent thoughts/urges/images; intrusive and/or unwanted; uses other thoughts/actions to suppress) - No; compulsions (repetitive behaviors used to lower distress/anxiety/obsessions) - No, time-consuming (over 1 hour per day) or cause significant distress/impairment - - No     Symptoms of Anorexia: restriction of " caloric intake leading to significantly low body weight - No, intense fear of gaining weight or persistent behavior that interferes with weight gain even thought at a significantly low weight - No, disturbance in the way in which one's body weight or shape is experienced, undue influence of body weight or shape on self evaluation, or persistent lack of recognition of the seriousness of the current low body weight - No     Symptoms of Bulimia: recurrent episodes of binge eating (definitely larger amount  than what others would eat and lack of a sense of control over eating during episode) - No, recurrent inappropriate compensatory behaviors in order to prevent weight gain (fasting, medications, exercise, vomiting) - No, binges and compensatory behaviors both occur on average at least once a week for 3 months - No, self evaluations is unduly influenced by body shape/weight- - No     Symptoms of Binge eating: recurrent episodes of binge eating (definitely larger amount than what others would eat and lack of a sense of control over eating during episode) - No, 3 or more of following (eating much more rapidly, eating until uncomfortably full, large amounts when not hungry, eating alone because of embarrassed by how much,  feeling disgusted with oneself, depressed or very guilty afterward) - No, distress regarding binges - No, binges occur on average at least once a week for 3 months - No        Substance/s:  Taken in larger amounts or over longer periods than intended: No,  Persistent desire or unsuccessful attempts to cut down or stop: No,  Great deal of time spent seeking, using or recovering from: No,  Craving or strong desire to use: No,  Recurrent use despite failure to meet major role obligation: No,  Continued use despite persistent or recurrent social/interparsonal issues due to use: No,  Important social/work/recreational activities given up due to use: No,  Recurrent use in physically hazardous situations:  No,  Continued use despite knowledge of persistent physical or psychological problem: No,  Tolerance (either increased need or diminished effect): No,  Pt minimizing symptoms ; +UDS       Procedures Performed: * No surgery found *    Hospital Course:    Patient was admitted to the inpatient psychiatry unit after being medically cleared in the ED. Chart and labs were reviewed. The patient was stabilized as follows:      Depression: pt counseled  -resume home lexapro at 5 mg po q day- will optimize as indicated- increase to 10 mg po q day   -the symptoms during this presentation were consistent with SIMD     Anxiety: pt counseled  -lexapro as above  -start vistaril 50 mg po q 6 hours prn  -r/o SIAD     Psychosocial stressors: pt counseled  -SW consulted to assist with resources      Polysubstance dependence: pt counseled  -rehab if willing once stable- pt declined      Nicotine Dependence: pt counseled  -start nicotine 14 mg patch dermal q day        The patient reports improved symptoms as documented. The patient is requesting discharge.The patient is hopeful, future oriented and goal directed. The patient readily discusses both short and long term goals. The patient can identify positive coping skills and social support. AIMS score was 0. During hospitalization, the patient was encouraged to go to both groups and individual counseling. Patient was monitored for any side effects. A meeting was held with multidisciplinary team prior to discharge and pt's diagnosis, current medications, and follow up were discussed. The patient has been compliant with treatment and can adequately attend to activities of daily living in an independent manner. The patient denies any side effects. The patient denies SI, HI, plan or intent for self harm or harm to others. The patient is no longer a danger to self or others nor gravely disabled disabled. Patient discharged  in stable condition with scheduled outpatient follow up.    She  denied withdrawals or cravings. She declined rehab.      Psychiatric ROS (observed, reported, or endorsed/denied):  Depressed mood - denies  Interest/pleasure/anhedonia: denies  Guilt/hopelessness/worthlessness - denies  Changes in Sleep - denies  Changes in Appetite - denies  Changes in Concentration - denies  Changes in Energy - denies  PMA/R- denies  Suicidal- active/passive ideations - denies  Homicidal ideations: active/passive ideations - denies     Hallucinations - denies  Delusions - denies  Disorganized behavior - denies  Disorganized speech - denies  Negative symptoms - denies     Elevated mood - denies  Decreased need for sleep - denies  Grandiosity - denies  Racing thoughts - denies  Impulsivity - denies  Irritability- denies  Increased energy - denies  Distractibility - denies  Increase in goal-directed activity or PMA- denies     Symptoms of ZOILA - denies  Symptoms of Panic Disorder- denies  Symptoms of PTSD - denies      Discussed diagnosis, risks and benefits of proposed treatment vs alternative treatments vs no treatment, and potential side effects of these treatments.  The patient expresses understanding of the above and displays the capacity to agree with this treatment given said understanding.  Patient also agrees that, currently, the benefits outweigh the risks and would like to pursue treatment at this time.      Discharge MSE: stated age, casually dressed, well groomed.  No psychomotor agitation or retardation.  No abnormal involuntary movements.  Gait normal.  Speech normal, conversational.  Language fluent English. Mood fine.  Affect normal range, pleasant, euthymic.  Thought process linear.  Associations intact.  Denies suicidal or homicidal ideation.  Denies auditory hallucinations, paranoid ideation, ideas of reference.  Memory intact.  Attention intact.  Fund of knowledge intact.  Insight intact.  Judgment intact.  Alert and oriented to person, place, time.      Tobacco Usage:  Is  patient a smoker? Yes  Does patient want prescription for Tobacco Cessation? No  Does patient want counseling for Tobacco Cessation? No    If patient would like to quit, then over the counter nicotine patch could be used. The patient could also follow up with his PCP or psychiatric provider for other alternatives.     Tobacco Use Treatment Practical Counseling    Were the following discussed with the patient:    Recognizing danger situations:    A. Alcohol use during the first month after quitting - Yes   B. Being around smoke and/or smokers or time/situations when the patient routinely smoked - Yes   C. Triggers and/or roadblocks are the same as danger situations - Yes    2.   Developing coping skills   A. Learning new ways to manage stress - Yes   B. Exercising and/or relaxation breathing - Yes   C. Changing routines - Yes   D. Distraction techniques to prevent tobacco use - Yes    3.   Basic information about quitting:   A. Benefits of quitting tobacco - Yes   B. How to quit techniques - Yes   C. Available resources to support quitting - Yes        Final Diagnoses:    Principal Problem: MDD, recurrent, severe without psychotic features    Secondary Diagnoses:   Unspecified Anxiety Disorder      Psychosocial stressors     Polysubstance dependence  Nicotine Dependence     Labs:  Admission on 03/09/2025   Component Date Value Ref Range Status    WBC 03/10/2025 3.25 (L)  3.90 - 12.70 K/uL Final    RBC 03/10/2025 3.61 (L)  4.00 - 5.40 M/uL Final    Hemoglobin 03/10/2025 10.1 (L)  12.0 - 16.0 g/dL Final    Hematocrit 03/10/2025 31.4 (L)  37.0 - 48.5 % Final    MCV 03/10/2025 87  82 - 98 fL Final    MCH 03/10/2025 28.0  27.0 - 31.0 pg Final    MCHC 03/10/2025 32.2  32.0 - 36.0 g/dL Final    RDW 03/10/2025 16.8 (H)  11.5 - 14.5 % Final    Platelets 03/10/2025 302  150 - 450 K/uL Final    MPV 03/10/2025 10.3  9.2 - 12.9 fL Final    Immature Granulocytes 03/10/2025 0.0  0.0 - 0.5 % Final    Gran # (ANC) 03/10/2025 1.1 (L)   1.8 - 7.7 K/uL Final    Immature Grans (Abs) 03/10/2025 0.00  0.00 - 0.04 K/uL Final    Lymph # 03/10/2025 1.6  1.0 - 4.8 K/uL Final    Mono # 03/10/2025 0.4  0.3 - 1.0 K/uL Final    Eos # 03/10/2025 0.1  0.0 - 0.5 K/uL Final    Baso # 03/10/2025 0.02  0.00 - 0.20 K/uL Final    nRBC 03/10/2025 0  0 /100 WBC Final    Gran % 03/10/2025 34.8 (L)  38.0 - 73.0 % Final    Lymph % 03/10/2025 49.8 (H)  18.0 - 48.0 % Final    Mono % 03/10/2025 12.0  4.0 - 15.0 % Final    Eosinophil % 03/10/2025 2.8  0.0 - 8.0 % Final    Basophil % 03/10/2025 0.6  0.0 - 1.9 % Final    Differential Method 03/10/2025 Automated   Final    Sodium 03/10/2025 141  136 - 145 mmol/L Final    Potassium 03/10/2025 3.4 (L)  3.5 - 5.1 mmol/L Final    Chloride 03/10/2025 111 (H)  95 - 110 mmol/L Final    CO2 03/10/2025 23  23 - 29 mmol/L Final    Glucose 03/10/2025 90  70 - 110 mg/dL Final    BUN 03/10/2025 10  6 - 20 mg/dL Final    Creatinine 03/10/2025 0.7  0.5 - 1.4 mg/dL Final    Calcium 03/10/2025 8.3 (L)  8.7 - 10.5 mg/dL Final    Total Protein 03/10/2025 6.9  6.0 - 8.4 g/dL Final    Albumin 03/10/2025 3.5  3.5 - 5.2 g/dL Final    Total Bilirubin 03/10/2025 0.3  0.1 - 1.0 mg/dL Final    Alkaline Phosphatase 03/10/2025 38 (L)  40 - 150 U/L Final    AST 03/10/2025 13  10 - 40 U/L Final    ALT 03/10/2025 14  10 - 44 U/L Final    eGFR 03/10/2025 >60  >60 mL/min/1.73 m^2 Final    Anion Gap 03/10/2025 7 (L)  8 - 16 mmol/L Final    Magnesium 03/10/2025 1.9  1.6 - 2.6 mg/dL Final    Iron 03/10/2025 25 (L)  30 - 160 ug/dL Final    Transferrin 03/10/2025 278  200 - 375 mg/dL Final    TIBC 03/10/2025 411  250 - 450 ug/dL Final    Saturated Iron 03/10/2025 6 (L)  20 - 50 % Final    Folate 03/10/2025 8.8  4.0 - 24.0 ng/mL Final    Ferritin 03/10/2025 7 (L)  20.0 - 300.0 ng/mL Final    Hemoglobin A1C 03/10/2025 5.4  4.0 - 5.6 % Final    Estimated Avg Glucose 03/10/2025 108  68 - 131 mg/dL Final    Cholesterol 03/10/2025 170  120 - 199 mg/dL Final    Triglycerides  03/10/2025 101  30 - 150 mg/dL Final    HDL 03/10/2025 44  40 - 75 mg/dL Final    LDL Cholesterol 03/10/2025 105.8  63.0 - 159.0 mg/dL Final    HDL/Cholesterol Ratio 03/10/2025 25.9  20.0 - 50.0 % Final    Total Cholesterol/HDL Ratio 03/10/2025 3.9  2.0 - 5.0 Final    Non-HDL Cholesterol 03/10/2025 126  mg/dL Final    Cholesterol 03/10/2025 170  120 - 199 mg/dL Final    Triglycerides 03/10/2025 101  30 - 150 mg/dL Final    HDL 03/10/2025 44  40 - 75 mg/dL Final    LDL Cholesterol 03/10/2025 105.8  63.0 - 159.0 mg/dL Final    HDL/Cholesterol Ratio 03/10/2025 25.9  20.0 - 50.0 % Final    Total Cholesterol/HDL Ratio 03/10/2025 3.9  2.0 - 5.0 Final    Non-HDL Cholesterol 03/10/2025 126  mg/dL Final   Admission on 03/09/2025, Discharged on 03/09/2025   Component Date Value Ref Range Status    POC Preg Test, Ur 03/09/2025 Negative  Negative Final     Acceptable 03/09/2025 Yes   Final    WBC 03/09/2025 3.34 (L)  3.90 - 12.70 K/uL Final    RBC 03/09/2025 3.60 (L)  4.00 - 5.40 M/uL Final    Hemoglobin 03/09/2025 9.9 (L)  12.0 - 16.0 g/dL Final    Hematocrit 03/09/2025 31.7 (L)  37.0 - 48.5 % Final    MCV 03/09/2025 88  82 - 98 fL Final    MCH 03/09/2025 27.5  27.0 - 31.0 pg Final    MCHC 03/09/2025 31.2 (L)  32.0 - 36.0 g/dL Final    RDW 03/09/2025 16.2 (H)  11.5 - 14.5 % Final    Platelets 03/09/2025 284  150 - 450 K/uL Final    MPV 03/09/2025 10.1  9.2 - 12.9 fL Final    Immature Granulocytes 03/09/2025 0.3  0.0 - 0.5 % Final    Gran # (ANC) 03/09/2025 1.1 (L)  1.8 - 7.7 K/uL Final    Immature Grans (Abs) 03/09/2025 0.01  0.00 - 0.04 K/uL Final    Lymph # 03/09/2025 1.8  1.0 - 4.8 K/uL Final    Mono # 03/09/2025 0.4  0.3 - 1.0 K/uL Final    Eos # 03/09/2025 0.1  0.0 - 0.5 K/uL Final    Baso # 03/09/2025 0.01  0.00 - 0.20 K/uL Final    nRBC 03/09/2025 0  0 /100 WBC Final    Gran % 03/09/2025 31.7 (L)  38.0 - 73.0 % Final    Lymph % 03/09/2025 53.9 (H)  18.0 - 48.0 % Final    Mono % 03/09/2025 11.7  4.0 - 15.0  % Final    Eosinophil % 03/09/2025 2.1  0.0 - 8.0 % Final    Basophil % 03/09/2025 0.3  0.0 - 1.9 % Final    Differential Method 03/09/2025 Automated   Final    Sodium 03/09/2025 139  136 - 145 mmol/L Final    Potassium 03/09/2025 3.3 (L)  3.5 - 5.1 mmol/L Final    Chloride 03/09/2025 110  95 - 110 mmol/L Final    CO2 03/09/2025 20 (L)  23 - 29 mmol/L Final    Glucose 03/09/2025 84  70 - 110 mg/dL Final    BUN 03/09/2025 9  6 - 20 mg/dL Final    Creatinine 03/09/2025 0.7  0.5 - 1.4 mg/dL Final    Calcium 03/09/2025 8.6 (L)  8.7 - 10.5 mg/dL Final    Total Protein 03/09/2025 7.3  6.0 - 8.4 g/dL Final    Albumin 03/09/2025 3.7  3.5 - 5.2 g/dL Final    Total Bilirubin 03/09/2025 0.4  0.1 - 1.0 mg/dL Final    Alkaline Phosphatase 03/09/2025 34 (L)  40 - 150 U/L Final    AST 03/09/2025 16  10 - 40 U/L Final    ALT 03/09/2025 15  10 - 44 U/L Final    eGFR 03/09/2025 >60  >60 mL/min/1.73 m^2 Final    Anion Gap 03/09/2025 9  8 - 16 mmol/L Final    Specimen UA 03/09/2025 Urine, Clean Catch   Final    Color, UA 03/09/2025 Colorless (A)  Yellow, Straw, Naty Final    Appearance, UA 03/09/2025 Hazy (A)  Clear Final    pH, UA 03/09/2025 7.0  5.0 - 8.0 Final    Specific Gravity, UA 03/09/2025 1.010  1.005 - 1.030 Final    Protein, UA 03/09/2025 Negative  Negative Final    Glucose, UA 03/09/2025 Negative  Negative Final    Ketones, UA 03/09/2025 Negative  Negative Final    Bilirubin (UA) 03/09/2025 Negative  Negative Final    Occult Blood UA 03/09/2025 3+ (A)  Negative Final    Nitrite, UA 03/09/2025 Negative  Negative Final    Urobilinogen, UA 03/09/2025 Negative  <2.0 EU/dL Final    Leukocytes, UA 03/09/2025 Negative  Negative Final    Benzodiazepines 03/09/2025 Presumptive Positive (A)  Negatiive Final    Methadone metabolites 03/09/2025 Negative  Negative Final    Cocaine (Metab.) 03/09/2025 Negative  Negative Final    Opiate Scrn, Ur 03/09/2025 Negative  Negative Final    Barbiturate Screen, Ur 03/09/2025 Negative  Negative  Final    Amphetamine Screen, Ur 03/09/2025 Presumptive Positive (A)  Negative Final    THC 03/09/2025 Presumptive Positive (A)  Negative Final    Phencyclidine 03/09/2025 Negative  Negative Final    Creatinine, Urine 03/09/2025 37.2  15.0 - 325.0 mg/dL Final    Toxicology Information 03/09/2025 SEE COMMENT   Final    Alcohol, Serum 03/09/2025 <10  <10 mg/dL Final    Acetaminophen (Tylenol), Serum 03/09/2025 <3.0 (L)  10.0 - 20.0 ug/mL Final    RBC, UA 03/09/2025 3  0 - 4 /hpf Final    WBC, UA 03/09/2025 3  0 - 5 /hpf Final    Bacteria 03/09/2025 Moderate (A)  None-Occ /hpf Final    Squam Epithel, UA 03/09/2025 3  /hpf Final    Unclass Melanie UA 03/09/2025 Rare  None-Moderate Final    Microscopic Comment 03/09/2025 SEE COMMENT   Final         Discharged Condition: stable and improved; not currently a danger to self/others or gravely disabled    Disposition: Home or Self Care    Is patient being discharged on multiple neuroleptics? No    Follow Up/Patient Instructions:     Take all medications as prescribed.  Attend all psychiatric and medical follow up appointments.   Abstain from all drugs and alcohol.  Call the crisis line at: [phone] for help in a crisis and emergent situations or call 911 and Return to ED for any acute worsening of your condition including suicidal or homicidal ideations      No discharge procedures on file.        Follow up apt: local [facility]- see SW/discharge notes for full details      Medications:  Reconciled Home Medications:      Medication List        START taking these medications      EScitalopram oxalate 10 MG tablet  Commonly known as: LEXAPRO  Take 1 tablet (10 mg total) by mouth once daily.     ferrous sulfate 325 (65 FE) MG EC tablet  Take 1 tablet (325 mg total) by mouth once daily.            CONTINUE taking these medications      albuterol 90 mcg/actuation inhaler  Commonly known as: VENTOLIN HFA  Inhale 2 puffs into the lungs every 6 (six) hours as needed for Wheezing. Rescue      cetirizine 10 MG tablet  Commonly known as: ZYRTEC  Take 1 tablet (10 mg total) by mouth once daily.     fluticasone propionate 50 mcg/actuation nasal spray  Commonly known as: FLONASE  1 spray (50 mcg total) by Each Nostril route 2 (two) times daily as needed for Rhinitis.            STOP taking these medications      acetaminophen 500 MG tablet  Commonly known as: TYLENOL     diclofenac sodium 1 % Gel  Commonly known as: VOLTAREN     ibuprofen 600 MG tablet  Commonly known as: ADVIL,MOTRIN     LIDOcaine 5 %  Commonly known as: LIDODERM     loratadine 10 mg tablet  Commonly known as: CLARITIN     ondansetron 4 MG tablet  Commonly known as: ZOFRAN     ondansetron 4 MG Tbdl  Commonly known as: ZOFRAN-ODT     oxyCODONE 5 MG immediate release tablet  Commonly known as: ROXICODONE     predniSONE 10 MG tablet  Commonly known as: DELTASONE     STOOL SOFTENER-LAXATIVE 8.6-50 mg per tablet  Generic drug: senna-docusate 8.6-50 mg                Diet: regular     Activity as tolerated    Total time spent discharging patient: 35 minutes    Norman Grover MD  Psychiatry

## 2025-03-12 NOTE — PSYCH
Pt discharge to home with follow-up at [facility]. Appointment has been scheduled on Thursday, 03/20/2025 @ 9:00 am. Pt will receive medication management, counseling for behavioral health and smoking cessation. Pt given prescriptions for psychotropic medications and Nicoderm CQ patches 14mg/24hr. AVS faxed 03/12/2025 @ 11:30 am.

## 2025-03-12 NOTE — PROGRESS NOTES
"   25 1011   Assessment   Patient's Identification of the Problem Patient presents calm, cooperative, reports an "alright" mood. Patient states her admit is due to anxiety and depression, "I had like 6 anxiety attacks." Patient verbalized that one of her sons  in (age 20), and states she is probably still grieving, "my mind be racing. It stay on the go." Patient admits to negative leisure lifestyle of alcohol(sometimes) & marijuana, UDS positive for Amphetamine(patient denies meth use). Patient reports she took Adderall. Patient reports she is single, had 4 children(one son ), has a HSD, is employed working 2 jobs, wears glasses, lives in [place] along with her 17 year old son. Patient verbalized her main goal is not to have anxiety attacks.   Leisure Interest Watching TV;Listen to Music;Religion  (play game on phone, busy with son activities)   Leisure Barriers Loss of Interest;Energy Level;Drug Use;Fears/Phobias;Too Busy  (fear loiosing another child)   Treatment Focus To Increase Motivation;To Improve Leisure Awareness/Lifestyle/Interest;To Promote Successful and Safe Self Expression;To Improve Coping Skills;To Increase Energy Level;To Educate and Increase Awareness of Sober Free Activities       Treatment Recommendation:   1:1 Intervention (as needed)    Cognitive Stimulation Skilled Activity  Self Expression Skilled Activity  Mild Exercises Skilled Activity  Stress Management Skilled Activity  Coping Skilled Activity  Leisure Education and Awareness Skilled Activity    Treatment Goal(s):  Long Term Goals Refer To Master Treatment Plan    Short Term Treatment Goal(s)  Patient Will:  Comply with Treatment  Exhibit Improvement in Mood  Demonstrate Constructive Expression of Feelings and Behavior  Verbalize Improvement in Mood  Identify at Least 2 Coping Skills or Leisure Skills to Reduce Depression and Hopelessness Upon Request from Therapist  Identify (+) Leisure / Recreation Activities " that Promote Wellness and Promote a Sober Lifestyle    Discharge Recommendations:  Encourage Patient to Actively Utilize Available Community Resources to Increase Leisure Involvement to Decrease Signs and Symptoms of Illness  Encourage Patient to Utilize Coping Skills on a Regular Basis to Reduce the Risk of Decompensating and Re-Hospitalizations  Support Group  Follow Up with After Care Appointments

## 2025-03-12 NOTE — NURSING
Patient approach nurses station stating her family is not going to be here soon to pick her up and she can be let out of here alone. She reports she is a grown woman and do not need anyone to check her out she can find her own way if we give her cell phone to access numbers. Patient informed we could not give the belongings to her until she is discharged off the unit. Patient demanded to be discharged.

## 2025-03-12 NOTE — CONSULTS
[facility] Medicine  Consult Note    Patient Name: Ofelia Ogden  MRN: [id number]  Admission Date: 3/9/2025  Hospital Length of Stay: 2 days  Attending Physician: Asya Macdonald MD   Primary Care Provider: Gilda Jackson MD           Patient information was obtained from patient and ER records.     Inpatient consult to [facility] for History and Physical  Consult performed by: Christy Powers PA-C  Consult ordered by: Asya Macdonald MD        Subjective:     Principal Problem: Anxiety    Chief Complaint: No chief complaint on file.       HPI: Patient is a 39 year old female with medical history of depression, anxiety, substance use disorder and anemia who was admitted to [facility] for anxiety attacks.  Hospital medicine consulted for medical management.        Past Medical History:   Diagnosis Date    Depression        Past Surgical History:   Procedure Laterality Date    DIAGNOSTIC LAPAROSCOPY N/A 6/18/2022    Procedure: LAPAROSCOPY, DIAGNOSTIC;  Surgeon: Mariposa Kruger MD;  Location: [facility];  Service: OB/GYN;  Laterality: N/A;    LAPAROSCOPIC LYSIS OF ADHESIONS  6/18/2022    Procedure: LYSIS, ADHESIONS, LAPAROSCOPIC;  Surgeon: Mariposa Kruger MD;  Location: [facility];  Service: OB/GYN;;    SALPINGOOPHORECTOMY Right 6/18/2022    Procedure: SALPINGO-OOPHORECTOMY;  Surgeon: Mariposa Kruger MD;  Location: [facility];  Service: OB/GYN;  Laterality: Right;       Review of patient's allergies indicates:  No Known Allergies    Current Facility-Administered Medications on File Prior to Encounter   Medication    [COMPLETED] acetaminophen tablet 650 mg    [COMPLETED] hydrOXYzine pamoate capsule 25 mg    [COMPLETED] sodium chloride 0.9% bolus 1,000 mL 1,000 mL     Current Outpatient Medications on File Prior to Encounter   Medication Sig    acetaminophen (TYLENOL) 500 MG tablet Take 2 tablets (1,000 mg total) by mouth every 6 (six) hours as needed for Pain.    acetaminophen  (TYLENOL) 500 MG tablet Take 2 tablets (1,000 mg total) by mouth every 6 (six) hours as needed.    acetaminophen (TYLENOL) 500 MG tablet Take 1 tablet (500 mg total) by mouth every 6 (six) hours as needed for Pain.    albuterol (VENTOLIN HFA) 90 mcg/actuation inhaler Inhale 2 puffs into the lungs every 6 (six) hours as needed for Wheezing. Rescue    cetirizine (ZYRTEC) 10 MG tablet Take 1 tablet (10 mg total) by mouth once daily.    diclofenac sodium (VOLTAREN) 1 % Gel Apply 2 g topically 3 (three) times daily as needed (muscle spasm pain). Apply 2 grams to affected area 3 times daily as needed    fluticasone propionate (FLONASE) 50 mcg/actuation nasal spray 1 spray (50 mcg total) by Each Nostril route 2 (two) times daily as needed for Rhinitis.    ibuprofen (ADVIL,MOTRIN) 600 MG tablet Take 1 tablet (600 mg total) by mouth every 6 (six) hours as needed for Pain.    LIDOcaine (LIDODERM) 5 % Apply to affected area. Use as directed. May use 4% lidocaine patch as alternative.    loratadine (CLARITIN) 10 mg tablet Take 1 tablet (10 mg total) by mouth once daily. for 7 days    ondansetron (ZOFRAN) 4 MG tablet Take 1 tablet (4 mg total) by mouth every 6 (six) hours as needed for Nausea.    ondansetron (ZOFRAN-ODT) 4 MG TbDL Take 1 tablet (4 mg total) by mouth every 6 (six) hours as needed.    oxyCODONE (ROXICODONE) 5 MG immediate release tablet Take 1 tablet (5 mg total) by mouth every 4 (four) hours as needed for Pain.    predniSONE (DELTASONE) 10 MG tablet Take 1 tablet (10 mg total) by mouth once daily. Take 4 tabs x 3 days, then take 2 tabs x 3 days, then take 1 tab x 3 days.    senna-docusate 8.6-50 mg (PERICOLACE) 8.6-50 mg per tablet Take 1 tablet by mouth 2 (two) times a day.     Family History    None       Tobacco Use    Smoking status: Never    Smokeless tobacco: Not on file   Substance and Sexual Activity    Alcohol use: Yes     Comment: occassional    Drug use: No    Sexual activity: Not on file     Review of  Systems   Constitutional:  Negative for chills and fever.   HENT:  Negative for congestion and drooling.    Respiratory:  Negative for cough and shortness of breath.    Cardiovascular:  Negative for chest pain and leg swelling.   Gastrointestinal:  Negative for constipation, diarrhea, nausea and vomiting.   Genitourinary:  Negative for difficulty urinating and dysuria.   Musculoskeletal:  Negative for arthralgias and gait problem.     Objective:     Vital Signs (Most Recent):  Temp: 97.4 °F (36.3 °C) (03/10/25 0815)  Pulse: 70 (03/10/25 0815)  Resp: 16 (03/10/25 0815)  BP: (!) 100/55 (03/10/25 0815)  SpO2: 100 % (03/09/25 2145) Vital Signs (24h Range):  Temp:  [97.4 °F (36.3 °C)-98.6 °F (37 °C)] 97.4 °F (36.3 °C)  Pulse:  [56-70] 70  Resp:  [14-18] 16  SpO2:  [98 %-100 %] 100 %  BP: ()/(45-74) 100/55     Weight: 90.1 kg (198 lb 10.2 oz)  Body mass index is 31.11 kg/m².     Physical Exam  Constitutional:       Appearance: Normal appearance.   HENT:      Head: Normocephalic and atraumatic.   Cardiovascular:      Rate and Rhythm: Normal rate and regular rhythm.   Pulmonary:      Effort: Pulmonary effort is normal. No respiratory distress.      Breath sounds: Normal breath sounds.   Abdominal:      General: Abdomen is flat. There is no distension.      Palpations: Abdomen is soft.   Musculoskeletal:      Right lower leg: No edema.      Left lower leg: No edema.   Skin:     General: Skin is warm and dry.   Neurological:      Mental Status: She is alert and oriented to person, place, and time. Mental status is at baseline.      Comments: CN II: Visual Fields full to confrontation  CN III, IV , VI PERRL   CN III: no palsy   Nystagmus: none  Diplopia: none  Ophthalmoparesis: none  CN V Facial sensation intact  CN VII facial expression full, symmetric  CN VIII normal  CN IX normal  CN X normal  CN XI normal  CN XII normal                 Significant Labs: A1C:   Recent Labs   Lab 03/10/25  1610   HGBA1C 5.4     ABGs:  "No results for input(s): "PH", "PCO2", "HCO3", "POCSATURATED", "BE", "TOTALHB", "COHB", "METHB", "O2HB", "POCFIO2", "PO2" in the last 48 hours.  Bilirubin:   Recent Labs   Lab 03/09/25  1624 03/10/25  1610   BILITOT 0.4 0.3     Blood Culture: No results for input(s): "LABBLOO" in the last 48 hours.  BMP:   Recent Labs   Lab 03/10/25  1610   GLU 90      K 3.4*   *   CO2 23   BUN 10   CREATININE 0.7   CALCIUM 8.3*   MG 1.9     CBC:   Recent Labs   Lab 03/10/25  1610   WBC 3.25*   HGB 10.1*   HCT 31.4*        CMP:   Recent Labs   Lab 03/10/25  1610      K 3.4*   *   CO2 23   GLU 90   BUN 10   CREATININE 0.7   CALCIUM 8.3*   PROT 6.9   ALBUMIN 3.5   BILITOT 0.3   ALKPHOS 38*   AST 13   ALT 14   ANIONGAP 7*     Cardiac Markers: No results for input(s): "CKMB", "MYOGLOBIN", "BNP", "TROPISTAT" in the last 48 hours.  Coagulation: No results for input(s): "PT", "INR", "APTT" in the last 48 hours.  Lactic Acid: No results for input(s): "LACTATE" in the last 48 hours.  Lipase: No results for input(s): "LIPASE" in the last 48 hours.  Lipid Panel:   Recent Labs   Lab 03/10/25  1610   CHOL 170  170   HDL 44  44   LDLCALC 105.8  105.8   TRIG 101  101   CHOLHDL 25.9  25.9     Magnesium:   Recent Labs   Lab 03/10/25  1610   MG 1.9     POCT Glucose: No results for input(s): "POCTGLUCOSE" in the last 48 hours.  Prealbumin: No results for input(s): "PREALBUMIN" in the last 48 hours.  Respiratory Culture: No results for input(s): "GSRESP", "RESPIRATORYC" in the last 48 hours.  Troponin: No results for input(s): "TROPONINI", "TROPONINIHS" in the last 48 hours.  TSH: No results for input(s): "TSH" in the last 4320 hours.  Urine Culture: No results for input(s): "LABURIN" in the last 48 hours.  Urine Studies: No results for input(s): "COLORU", "APPEARANCEUA", "PHUR", "SPECGRAV", "PROTEINUA", "GLUCUA", "KETONESU", "BILIRUBINUA", "OCCULTUA", "NITRITE", "UROBILINOGEN", "LEUKOCYTESUR", "RBCUA", "WBCUA", " ""BACTERIA", "SQUAMEPITHEL", "HYALINECASTS" in the last 48 hours.    Invalid input(s): "WRIGHTSUR"    Significant Imaging: I have reviewed all pertinent imaging results/findings within the past 24 hours.  Assessment/Plan:     Substance use disorder  UDS positive for benzo, meth and THC  Cessation resources per psych       Anemia  Anemia is likely due to Iron deficiency. Most recent hemoglobin and hematocrit are listed below.  Recent Labs     03/09/25  1624   HGB 9.9*   HCT 31.7*     Plan  - due to iron deficiency anemia.  Po Iron.  Pt needs f/u with PCP       VTE Risk Mitigation (From admission, onward)      None                Thank you for your consult. I will sign off. Please contact us if you have any additional questions.    Christy Powers PA-C  Department of [facility] Medicine   [facility]      "

## 2025-03-12 NOTE — NURSING
Patient escorted off the unit with staff. Belongings and discharge instructions with the patient and reviewed.

## 2025-03-12 NOTE — NURSING
Pt is sleeping at this time and has slept 8 hrs. Resp even & unlabored. Pathways clear. Q 15 minute safety checks ongoing. All precautions maintained.

## 2025-03-12 NOTE — PSYCH
Pt's follow-up appointment has been scheduled at [facility] on Thursday, 03/20/2025 @ 9:00 am. Follow-up note entered.

## 2025-03-12 NOTE — PROGRESS NOTES
Psychotherapy:  Target symptoms: anxiety , mood disorder  Why chosen therapy is appropriate versus another modality: relevant to diagnosis, patient responds to this modality, evidence based practice  Outcome monitoring methods: self-report, observation  Therapeutic intervention type: insight oriented psychotherapy, behavior modifying psychotherapy, supportive psychotherapy, interactive psychotherapy  Topics discussed/themes: building skills sets for symptom management, symptom recognition, substance abuse  The patient's response to the intervention is accepting. The patient's progress toward treatment goals is fair.   Duration of intervention: 16 minutes.    Norman Grover MD  Psychiatry

## 2025-07-07 ENCOUNTER — HOSPITAL ENCOUNTER (EMERGENCY)
Facility: OTHER | Age: 40
Discharge: HOME | End: 2025-07-07
Attending: EMERGENCY MEDICINE
Payer: COMMERCIAL

## 2025-07-07 VITALS
OXYGEN SATURATION: 97 % | HEIGHT: 67 IN | TEMPERATURE: 100 F | RESPIRATION RATE: 18 BRPM | DIASTOLIC BLOOD PRESSURE: 75 MMHG | HEART RATE: 79 BPM | SYSTOLIC BLOOD PRESSURE: 116 MMHG | WEIGHT: 186 LBS | BODY MASS INDEX: 29.19 KG/M2

## 2025-07-07 DIAGNOSIS — R68.89 FLU-LIKE SYMPTOMS: Primary | ICD-10-CM

## 2025-07-07 LAB
CTP QC/QA: YES
CTP QC/QA: YES
POC MOLECULAR INFLUENZA A AGN: NEGATIVE
POC MOLECULAR INFLUENZA B AGN: NEGATIVE
SARS-COV-2 RDRP RESP QL NAA+PROBE: NEGATIVE

## 2025-07-07 PROCEDURE — 99282 EMERGENCY DEPT VISIT SF MDM: CPT

## 2025-07-07 PROCEDURE — 87635 SARS-COV-2 COVID-19 AMP PRB: CPT | Performed by: EMERGENCY MEDICINE

## 2025-07-07 RX ORDER — CETIRIZINE HYDROCHLORIDE 10 MG/1
10 TABLET ORAL DAILY
Qty: 30 TABLET | Refills: 0 | Status: SHIPPED | OUTPATIENT
Start: 2025-07-07 | End: 2026-07-07

## 2025-07-07 NOTE — ED TRIAGE NOTES
Generalized body aches with fever, chills, headache, weakness since last night. Taking OTC cold meds without relief. Denies N/V. Presents awake, alert.

## 2025-07-07 NOTE — Clinical Note
"Ofelia"Audelia Ogden was seen and treated in our emergency department on 7/7/2025.  She may return to work on 07/12/2025.       If you have any questions or concerns, please don't hesitate to call.      Rashad LARRY, ED RN    "

## 2025-07-07 NOTE — ED PROVIDER NOTES
"     Source of History:  The patient's    Chief complaint:  General Illness (Pt reports fevers, body aches, headaches, sore throat. Reports many people at work dx with covid.)      HPI:  Ofelia Ogden is a 39 y.o. female who complains of 1 day history of fevers, body aches, headache and sore throat.  Also feels her ears are popping.  Denies any cough or shortness of breath.  Multiple people at work or diagnosed with COVID recently.    This is the extent to the patients complaints today here in the emergency department.    ROS:   See HPI    Review of patient's allergies indicates:  No Known Allergies    PMH:  As per HPI and below:  Past Medical History:   Diagnosis Date    Depression      Past Surgical History:   Procedure Laterality Date    DIAGNOSTIC LAPAROSCOPY N/A 6/18/2022    Procedure: LAPAROSCOPY, DIAGNOSTIC;  Surgeon: Mariposa Kruger MD;  Location: [facility];  Service: OB/GYN;  Laterality: N/A;    LAPAROSCOPIC LYSIS OF ADHESIONS  6/18/2022    Procedure: LYSIS, ADHESIONS, LAPAROSCOPIC;  Surgeon: Mariposa Kruger MD;  Location: [facility];  Service: OB/GYN;;    SALPINGOOPHORECTOMY Right 6/18/2022    Procedure: SALPINGO-OOPHORECTOMY;  Surgeon: Mariposa Kruger MD;  Location: [facility];  Service: OB/GYN;  Laterality: Right;       Social History[1]    Physical Exam:    /75 (BP Location: Left arm)   Pulse 79   Temp 99.6 °F (37.6 °C) (Oral)   Resp 18   Ht 5' 7" (1.702 m)   Wt 84.4 kg (186 lb)   SpO2 97%   Breastfeeding No   BMI 29.13 kg/m²   Nursing note and vital signs reviewed.  Constitutional:  Uncomfortable appearing.  Nontoxic  Eyes: No conjunctival injection.  Extraocular muscles are intact.  ENT: Oropharynx clear. No tonisillar exudate or swelling. Uvula midline and normal.  Tympanic membranes are clear bilaterally with no exudate or erythema.  External canal free of debris.  No elevation of posterior oropharynx.  Nasal congestion with mucosal edema  Cardiovascular: Regular rate and " rhythm.  No murmurs. No gallops. No rubs.  Respiratory: Clear to auscultation bilaterally.  Good air movement.  No wheezes.  No rhonchi. No rales. No accessory muscle use.  Musculoskeletal: Neck supple.  No meningismus.  Skin: No rashes seen.  Good turgor.  No abrasions.  No ecchymoses.      MDM/ Differential Dx:  Patient has signs and symptoms viral upper respiratory infection/viral syndrome.  Possibility of COVID or influenza.  I have considered but do not suspect pneumonia I do not feel imaging is indicated.  The patient's vital signs are otherwise are stable and do not feel any further workup is indicated.  The patient does not appear to be septic and no evidence of peritonsillar abscess or retropharyngeal abscess.  Also consider but do not suspect strep pharyngitis or mononucleosis.  Will treat with symptomatic care as an outpatient.    ED Course as of 07/07/25 1247   Mon Jul 07, 2025   1239 POC Molecular Influenza A Ag: Negative []   1239 POC Molecular Influenza B Ag: Negative []   1239 SARS-CoV-2 RNA, Amplification, Qual: Negative []      ED Course User Index  [SM] Adam El DO                 Diagnostic Impression:    1. Flu-like symptoms         ED Disposition Condition    Discharge Stable            ED Prescriptions       Medication Sig Dispense Start Date End Date Auth. Provider    cetirizine (ZYRTEC) 10 MG tablet Take 1 tablet (10 mg total) by mouth once daily. 30 tablet 7/7/2025 7/7/2026 Adam El DO          Follow-up Information       Follow up With Specialties Details Why Contact Info    Gilda Jackson MD Internal Medicine In 1 week  [address] [place] [id number]  [phone]                 [1]   Social History  Tobacco Use    Smoking status: Never     Passive exposure: Never   Substance Use Topics    Alcohol use: Yes     Comment: occassional    Drug use: No        Adam El DO  07/07/25 1249

## 2025-07-07 NOTE — FIRST PROVIDER EVALUATION
Emergency Department TeleTriage Encounter Note      CHIEF COMPLAINT    Chief Complaint   Patient presents with    General Illness     Pt reports fevers, body aches, headaches, sore throat. Reports many people at work dx with covid.       VITAL SIGNS   Initial Vitals [07/07/25 1111]   BP Pulse Resp Temp SpO2   116/75 79 18 99.6 °F (37.6 °C) 97 %      MAP       --            ALLERGIES    Review of patient's allergies indicates:  No Known Allergies    PROVIDER TRIAGE NOTE  Patient presents with complaint of body aches, fever, headache and sore throat. Also reports ear pain.no stomach complaints.       Phy:   Constitutional: well nourished, well developed, appearing stated age, NAD        Initial orders will be placed and care will be transferred to an alternate provider when patient is roomed for a full evaluation. Any additional orders and the final disposition will be determined by that provider.        ORDERS  Labs Reviewed   SARS-COV-2 RDRP GENE   POCT INFLUENZA A/B MOLECULAR       ED Orders (720h ago, onward)      Start Ordered     Status Ordering Provider    07/07/25 1119 07/07/25 1118  POCT COVID-19 Rapid Screening  Once         Ordered JUDE MCLEOD    07/07/25 1119 07/07/25 1118  POCT Influenza A/B Molecular  Once         Ordered JUDE MCLEOD              Virtual Visit Note: The provider triage portion of this emergency department evaluation and documentation was performed via [employer], a HIPAA-compliant telemedicine application, in concert with a tele-presenter in the room. A face to face patient evaluation with one of my colleagues will occur once the patient is placed in an emergency department room.      DISCLAIMER: This note was prepared with [employer] voice recognition transcription software. Garbled syntax, mangled pronouns, and other bizarre constructions may be attributed to that software system.

## (undated) DEVICE — SCRUB 10% POVIDONE IODINE 4OZ

## (undated) DEVICE — DRAPE STERI LONG

## (undated) DEVICE — KIT WING PAD POSITIONING

## (undated) DEVICE — ADHESIVE DERMABOND ADVANCED

## (undated) DEVICE — NDL INSUFFLATION VERRES 120MM

## (undated) DEVICE — SYS SEE SHARP SCOPE ANTIFOG

## (undated) DEVICE — SOL BETADINE 5%

## (undated) DEVICE — SPONGE LAP 18X18 PREWASHED

## (undated) DEVICE — TRAY DRY SKIN SCRUB PREP

## (undated) DEVICE — TOWEL OR DISP STRL BLUE 4/PK

## (undated) DEVICE — GLOVE BIOGEL SKINSENSE PI 7.0

## (undated) DEVICE — TROCAR ENDOPATH XCEL 5X100MM

## (undated) DEVICE — SYR 10CC LUER LOCK

## (undated) DEVICE — CANNULA ENDOPATH XCEL 5X100MM

## (undated) DEVICE — ADHESIVE MASTISOL VIAL 48/BX

## (undated) DEVICE — PENCIL ELECTROSURG HOLST W/BLD

## (undated) DEVICE — PACK LAPAROSCOPY BAPTIST

## (undated) DEVICE — BOWL STERILE LARGE 32OZ

## (undated) DEVICE — SUT MCRYL PLUS 4-0 PS2 27IN

## (undated) DEVICE — UNDERGLOVES BIOGEL PI SZ 7 LF

## (undated) DEVICE — SUT VICRYL+ 27 UR-6 VIOL

## (undated) DEVICE — SEE MEDLINE ITEM 157126

## (undated) DEVICE — FORCEP 5MM BIPOLAR CUT EVEREST

## (undated) DEVICE — UTERINE MANIPULATOR HUMI 6003

## (undated) DEVICE — SOL NS 1000CC

## (undated) DEVICE — SOL PVP-I SCRUB 7.5% 4OZ

## (undated) DEVICE — JELLY SURGILUBE 5GR